# Patient Record
Sex: MALE | Race: WHITE | NOT HISPANIC OR LATINO | Employment: OTHER | URBAN - METROPOLITAN AREA
[De-identification: names, ages, dates, MRNs, and addresses within clinical notes are randomized per-mention and may not be internally consistent; named-entity substitution may affect disease eponyms.]

---

## 2017-11-07 LAB — CRC RECOMMENDATION EXT: NORMAL

## 2022-04-13 LAB
LEFT EYE DM RETINOPATHY: NEGATIVE
RIGHT EYE DM RETINOPATHY: NEGATIVE

## 2022-09-27 ENCOUNTER — OFFICE VISIT (OUTPATIENT)
Dept: PRIMARY CARE CLINIC | Facility: CLINIC | Age: 77
End: 2022-09-27
Payer: MEDICARE

## 2022-09-27 VITALS
WEIGHT: 174 LBS | HEIGHT: 68 IN | BODY MASS INDEX: 26.37 KG/M2 | HEART RATE: 75 BPM | SYSTOLIC BLOOD PRESSURE: 130 MMHG | DIASTOLIC BLOOD PRESSURE: 70 MMHG | RESPIRATION RATE: 20 BRPM | TEMPERATURE: 97 F | OXYGEN SATURATION: 98 %

## 2022-09-27 DIAGNOSIS — Z12.11 SCREEN FOR COLON CANCER: ICD-10-CM

## 2022-09-27 DIAGNOSIS — Z00.00 MEDICARE ANNUAL WELLNESS VISIT, SUBSEQUENT: ICD-10-CM

## 2022-09-27 DIAGNOSIS — E55.9 VITAMIN D DEFICIENCY: ICD-10-CM

## 2022-09-27 DIAGNOSIS — R41.3 MEMORY PROBLEM: ICD-10-CM

## 2022-09-27 DIAGNOSIS — R26.89 BALANCE PROBLEM: Primary | ICD-10-CM

## 2022-09-27 DIAGNOSIS — M25.512 LEFT SHOULDER PAIN, UNSPECIFIED CHRONICITY: ICD-10-CM

## 2022-09-27 DIAGNOSIS — R32 URINARY INCONTINENCE, UNSPECIFIED TYPE: ICD-10-CM

## 2022-09-27 DIAGNOSIS — R41.3 OTHER AMNESIA: ICD-10-CM

## 2022-09-27 DIAGNOSIS — E11.9 TYPE 2 DIABETES MELLITUS WITHOUT COMPLICATION, WITHOUT LONG-TERM CURRENT USE OF INSULIN: ICD-10-CM

## 2022-09-27 PROCEDURE — 99204 PR OFFICE/OUTPT VISIT, NEW, LEVL IV, 45-59 MIN: ICD-10-PCS | Mod: ,,, | Performed by: STUDENT IN AN ORGANIZED HEALTH CARE EDUCATION/TRAINING PROGRAM

## 2022-09-27 PROCEDURE — 99204 OFFICE O/P NEW MOD 45 MIN: CPT | Mod: ,,, | Performed by: STUDENT IN AN ORGANIZED HEALTH CARE EDUCATION/TRAINING PROGRAM

## 2022-09-27 RX ORDER — DEXTROAMPHETAMINE SACCHARATE, AMPHETAMINE ASPARTATE MONOHYDRATE, DEXTROAMPHETAMINE SULFATE AND AMPHETAMINE SULFATE 6.25; 6.25; 6.25; 6.25 MG/1; MG/1; MG/1; MG/1
25 CAPSULE, EXTENDED RELEASE ORAL EVERY MORNING
COMMUNITY
End: 2022-10-03

## 2022-09-27 RX ORDER — INSULIN PUMP SYRINGE, 3 ML
EACH MISCELLANEOUS
COMMUNITY

## 2022-09-27 RX ORDER — METFORMIN HYDROCHLORIDE 1000 MG/1
1000 TABLET ORAL DAILY
COMMUNITY
End: 2022-11-09

## 2022-09-27 RX ORDER — SERTRALINE HYDROCHLORIDE 50 MG/1
50 TABLET, FILM COATED ORAL DAILY
COMMUNITY
End: 2022-10-03

## 2022-09-27 RX ORDER — IBUPROFEN 200 MG
200 TABLET ORAL EVERY 8 HOURS PRN
COMMUNITY
End: 2023-04-10

## 2022-09-27 RX ORDER — ACETAMINOPHEN 325 MG/1
325 TABLET ORAL EVERY 6 HOURS PRN
COMMUNITY

## 2022-09-27 NOTE — PROGRESS NOTES
"Subjective:       Patient ID: Clifford Warren is a 77 y.o. male.    ----------------------------  ADHD (attention deficit hyperactivity disorder)  Depression  Diabetes mellitus, type 2  [place] (hard of hearing)  Loss of equilibrium  S/P epidural steroid injection  Sleep apnea, unspecified      Comment:  "not using Cpap"  Smoker     Chief Complaint: Establish Care (Recently moved from [place] ), Dr MIGUELINA Moore -[facility]  (C/o back pain , ongoing ,10/2021 had steroid injection Dr Rodriguez -[place] [facility]), Gait Problem, and Diarrhea (Colonoscopy due, referral to GI locate)    Presents to establish care. Main issue is poor balance. Present for few years. Has seen Neuro in [place] (Dr. Rodriguez at [facility] [facility]). Has undergone CT's, audiology testing, EMGs, etc and never seen much improvement. Underwent injections in the lumbar spine for chronic back pain and 4 months of PT which produced a little improvement but not much and not permanent. Seems that no clear diagnosis was ever made.     DM2 - not on meds sec to side effects from metformin. Seen by Dr. Gar. Most recent A1c 7.4%.     Psych - Was on Adderall for 30+ years. Was on Zoloft 30+ years. Has not been taking either recently and wants to be off them. Has not noted any new symptoms or issues since stopping them.       Review of Systems   Constitutional:  Negative for chills, fever and unexpected weight change.   HENT:  Negative for sinus pressure/congestion and sore throat.    Eyes:  Negative for pain and redness.   Respiratory:  Negative for cough, shortness of breath and wheezing.    Cardiovascular:  Negative for chest pain and leg swelling.   Gastrointestinal:  Negative for abdominal pain, nausea and vomiting.   Endocrine: Negative for polydipsia and polyuria.   Genitourinary:  Negative for dysuria and hematuria.   Musculoskeletal:  Positive for arthralgias (L shoulder pain). Negative for myalgias.   Integumentary:  Negative " for rash and mole/lesion.   Neurological:  Positive for dizziness, coordination difficulties, memory loss and coordination difficulties.   Hematological:  Negative for adenopathy. Does not bruise/bleed easily.   Psychiatric/Behavioral:  Positive for confusion. The patient is not nervous/anxious.          Objective:      Physical Exam  Vitals and nursing note reviewed.   Constitutional:       General: He is not in acute distress.     Appearance: He is not ill-appearing.   HENT:      Nose: No congestion or rhinorrhea.      Mouth/Throat:      Mouth: Mucous membranes are moist.      Pharynx: No oropharyngeal exudate or posterior oropharyngeal erythema.   Eyes:      Extraocular Movements: Extraocular movements intact.      Conjunctiva/sclera: Conjunctivae normal.      Pupils: Pupils are equal, round, and reactive to light.   Cardiovascular:      Rate and Rhythm: Normal rate and regular rhythm.      Pulses: Normal pulses.   Pulmonary:      Effort: Pulmonary effort is normal.      Breath sounds: Normal breath sounds.   Abdominal:      Palpations: Abdomen is soft. There is no mass.      Tenderness: There is no abdominal tenderness.   Musculoskeletal:         General: No deformity.      Right lower leg: No edema.      Left lower leg: No edema.   Lymphadenopathy:      Cervical: No cervical adenopathy.   Skin:     General: Skin is warm and dry.      Findings: No rash.   Neurological:      General: No focal deficit present.      Mental Status: He is alert and oriented to person, place, and time. Mental status is at baseline.      Gait: Gait abnormal (unstable on initial standing).   Psychiatric:         Mood and Affect: Mood normal.         Behavior: Behavior normal.         Assessment & Plan:   1. Balance problem  Assessment & Plan:  Pt has impaired balance, cognitive/memory impairment, and urinary incontinence. We obtained his most recent CT head (from [facility]) which showed ventricular dilation - this was attributed to  microvascular disease and volume loss.   However, given the constellation of symptoms and potentially reversible nature of NPH, patient warrants further eval with MRI which is more sensitive/specific. MRI brain w/o contrast ordered  We'll continue working to obtain records from [facility] in the meantime  Started referral process to Neuro (typically 4-5 month wait)    Orders:  -     Ambulatory referral/consult to Neurology    2. Memory problem  -     Ambulatory referral/consult to Neurology    3. Urinary incontinence, unspecified type  -     Ambulatory referral/consult to Neurology    4. Left shoulder pain, unspecified chronicity  -     Ambulatory referral/consult to Orthopedics    5. Screen for colon cancer  -     Ambulatory referral/consult to Gastroenterology    6. Medicare annual wellness visit, subsequent  -     CBC Auto Differential  -     Comprehensive Metabolic Panel  -     Hemoglobin A1C  -     Lipid Panel  -     TSH  -     Urinalysis, Reflex to Urine Culture Urine, Clean Catch  -     Vitamin D    7. Type 2 diabetes mellitus without complication, without long-term current use of insulin  -     CBC Auto Differential  -     Comprehensive Metabolic Panel  -     Hemoglobin A1C  -     Lipid Panel    8. Vitamin D deficiency  -     Vitamin D    9. Other amnesia  -     MRI Brain Without Contrast        Follow up in about 6 weeks (around 11/8/2022) for Wellness. In addition to their scheduled follow up, the patient has also been instructed to follow up on as needed basis.

## 2022-10-03 ENCOUNTER — DOCUMENTATION ONLY (OUTPATIENT)
Dept: PRIMARY CARE CLINIC | Facility: CLINIC | Age: 77
End: 2022-10-03
Payer: MEDICARE

## 2022-10-03 PROBLEM — R26.89 BALANCE PROBLEM: Status: ACTIVE | Noted: 2022-10-03

## 2022-10-03 NOTE — ASSESSMENT & PLAN NOTE
Pt has impaired balance, cognitive/memory impairment, and urinary incontinence. We obtained his most recent CT head (from [facility]) which showed ventricular dilation - this was attributed to microvascular disease and volume loss.   However, given the constellation of symptoms and potentially reversible nature of NPH, patient warrants further eval with MRI which is more sensitive/specific. MRI brain w/o contrast ordered  We'll continue working to obtain records from [facility] in the meantime  Started referral process to Neuro (typically 4-5 month wait)

## 2022-10-12 ENCOUNTER — HOSPITAL ENCOUNTER (OUTPATIENT)
Dept: RADIOLOGY | Facility: CLINIC | Age: 77
Discharge: HOME | End: 2022-10-12
Attending: ORTHOPAEDIC SURGERY
Payer: MEDICARE

## 2022-10-12 ENCOUNTER — OFFICE VISIT (OUTPATIENT)
Dept: ORTHOPEDICS | Facility: CLINIC | Age: 77
End: 2022-10-12
Payer: MEDICARE

## 2022-10-12 VITALS
WEIGHT: 174 LBS | HEIGHT: 68 IN | SYSTOLIC BLOOD PRESSURE: 134 MMHG | BODY MASS INDEX: 26.37 KG/M2 | DIASTOLIC BLOOD PRESSURE: 75 MMHG | HEART RATE: 69 BPM

## 2022-10-12 DIAGNOSIS — M75.42 IMPINGEMENT SYNDROME OF LEFT SHOULDER: Primary | ICD-10-CM

## 2022-10-12 DIAGNOSIS — M25.512 LEFT SHOULDER PAIN, UNSPECIFIED CHRONICITY: ICD-10-CM

## 2022-10-12 PROCEDURE — 99203 PR OFFICE/OUTPT VISIT, NEW, LEVL III, 30-44 MIN: ICD-10-PCS | Mod: 25,,, | Performed by: ORTHOPAEDIC SURGERY

## 2022-10-12 PROCEDURE — 73030 XR SHOULDER COMPLETE 2 OR MORE VIEWS LEFT: ICD-10-PCS | Mod: LT,,, | Performed by: ORTHOPAEDIC SURGERY

## 2022-10-12 PROCEDURE — 73030 X-RAY EXAM OF SHOULDER: CPT | Mod: LT,,, | Performed by: ORTHOPAEDIC SURGERY

## 2022-10-12 PROCEDURE — 20610 LARGE JOINT ASPIRATION/INJECTION: L SUBACROMIAL BURSA: ICD-10-PCS | Mod: LT,,, | Performed by: ORTHOPAEDIC SURGERY

## 2022-10-12 PROCEDURE — 20610 DRAIN/INJ JOINT/BURSA W/O US: CPT | Mod: LT,,, | Performed by: ORTHOPAEDIC SURGERY

## 2022-10-12 PROCEDURE — 99203 OFFICE O/P NEW LOW 30 MIN: CPT | Mod: 25,,, | Performed by: ORTHOPAEDIC SURGERY

## 2022-10-12 RX ORDER — LIDOCAINE HYDROCHLORIDE 20 MG/ML
5 INJECTION, SOLUTION EPIDURAL; INFILTRATION; INTRACAUDAL; PERINEURAL
Status: DISCONTINUED | OUTPATIENT
Start: 2022-10-12 | End: 2022-10-12 | Stop reason: HOSPADM

## 2022-10-12 RX ORDER — BETAMETHASONE SODIUM PHOSPHATE AND BETAMETHASONE ACETATE 3; 3 MG/ML; MG/ML
6 INJECTION, SUSPENSION INTRA-ARTICULAR; INTRALESIONAL; INTRAMUSCULAR; SOFT TISSUE
Status: DISCONTINUED | OUTPATIENT
Start: 2022-10-12 | End: 2022-10-12 | Stop reason: HOSPADM

## 2022-10-12 RX ADMIN — LIDOCAINE HYDROCHLORIDE 5 ML: 20 INJECTION, SOLUTION EPIDURAL; INFILTRATION; INTRACAUDAL; PERINEURAL at 01:10

## 2022-10-12 RX ADMIN — BETAMETHASONE SODIUM PHOSPHATE AND BETAMETHASONE ACETATE 6 MG: 3; 3 INJECTION, SUSPENSION INTRA-ARTICULAR; INTRALESIONAL; INTRAMUSCULAR; SOFT TISSUE at 01:10

## 2022-10-12 NOTE — PROCEDURES
Large Joint Aspiration/Injection: L subacromial bursa    Date/Time: 10/12/2022 1:00 PM  Performed by: Cipriano Hou Jr., MD  Authorized by: Cipriano Hou Jr., MD     Consent Done?:  Yes (Verbal)  Indications:  Pain  Site marked: the procedure site was marked    Timeout: prior to procedure the correct patient, procedure, and site was verified    Prep: patient was prepped and draped in usual sterile fashion      Local anesthesia used?: Yes    Local anesthetic:  Topical anesthetic    Details:  Needle Size:  21 G  Ultrasonic Guidance for needle placement?: No    Approach:  Lateral  Location:  Shoulder  Site:  L subacromial bursa  Medications:  5 mL LIDOcaine (PF) 20 mg/mL (2%) 20 mg/mL (2 %); 6 mg betamethasone acetate-betamethasone sodium phosphate 6 mg/mL  Patient tolerance:  Patient tolerated the procedure well with no immediate complications

## 2022-10-12 NOTE — PROGRESS NOTES
"Chief Complaint:   Chief Complaint   Patient presents with    Left Shoulder - Pain    Pain     unable to take temp, Left shoulder pain, isnt sure how he injuried it, wife thinks it might have been from trying to up root a tree a few years back, notices with rest hes doing better, has some balance issues right now had a fall august 1st       Consulting Physician: Edu Morrow MD    History of present illness:    he is a pleasant 77 y.o. year old male who is had left shoulder pain over the last few years and increasing in the spring and summer.  The pain is located posterior along the shoulder.  It is worse with yd work.  It is better at rest.  He denies any new numbness or tingling.    Past Medical History:   Diagnosis Date    ADHD (attention deficit hyperactivity disorder)     Depression     Diabetes mellitus, type 2     [place] (hard of hearing)     Loss of equilibrium     S/P epidural steroid injection     Sleep apnea, unspecified     "not using Cpap"    Smoker        Past Surgical History:   Procedure Laterality Date    CATARACT EXTRACTION, BILATERAL  2021    CHOLECYSTECTOMY  2005    COLONOSCOPY      EYE SURGERY      HERNIA REPAIR      TONSILLECTOMY  1950       Current Outpatient Medications   Medication Sig    acetaminophen (TYLENOL) 325 MG tablet Take 325 mg by mouth every 6 (six) hours as needed for Pain.    blood-glucose meter kit by Other route. Use as instructed    Accu-check guide  Accu-check fastclix lancets    ibuprofen (ADVIL,MOTRIN) 200 MG tablet Take 200 mg by mouth every 8 (eight) hours as needed for Pain.    metFORMIN (GLUCOPHAGE) 1000 MG tablet Take 1,000 mg by mouth once daily.     No current facility-administered medications for this visit.       Review of patient's allergies indicates:  No Known Allergies    Family History   Problem Relation Age of Onset    Diabetes Mother     Hearing loss Mother     Stroke Mother     Cancer Father         bladder ca    Dementia Father     Depression Father  " "   Learning disabilities Sister        Social History     Socioeconomic History    Marital status:    Tobacco Use    Smoking status: Every Day     Packs/day: 1.00     Years: 15.00     Pack years: 15.00     Types: Cigarettes     Passive exposure: Current    Smokeless tobacco: Never   Substance and Sexual Activity    Alcohol use: Not Currently    Drug use: Never    Sexual activity: Not Currently     Partners: Female     Birth control/protection: None       Review of Systems:    Constitution:   Denies chills, fever, and sweats.  HENT:   Denies headaches or blurry vision.  Cardiovascular:  Denies chest pain or irregular heart beat.  Respiratory:   Denies cough or shortness of breath.  Gastrointestinal:  Denies abdominal pain, nausea, or vomiting.  Musculoskeletal:   Denies muscle cramps.  Neurological:   Denies dizziness or focal weakness.  Psychiatric/Behavior: Normal mental status.  Hematology/Lymph:  Denies bleeding problem or easy bruising/bleeding.  Skin:    Denies rash or suspicious lesions.    Examination:    Vital Signs:    Vitals:    10/12/22 1311   BP: 134/75   Pulse: 69   Weight: 78.9 kg (174 lb)   Height: 5' 8" (1.727 m)       Body mass index is 26.46 kg/m².    Constitution:   Well-developed, well nourished patient in no acute distress.  Neurological:   Alert and oriented x 3 and cooperative to examination.     Psychiatric/Behavior: Normal mental status.  Respiratory:   No shortness of breath.  Eyes:    Extraoccular muscles intact  Skin:    No scars, rash or suspicious lesions.    MSK:   Shoulder Exam:                   Right        Left  Skin:                                   Normal     Normal  AC joint tenderness:           None         None  Forward Flexion:                180            140  Abduction:                          180           100  External Rotation:               80              80  Internal Rotation:                80             80  Supraspinatus stress test: Neg           " Neg  Hawkin's Impingement:     Neg           +  Neer Impingement:            Neg           +  Apprehension:                   Neg           Neg  [place]'s:                           Neg           Neg  Speed's test:                     Neg            Neg  Strength:  External Rotation:           5/5                5/5  Lift Off/belly press:          5/5                5/5    N-V status:                   Intact             Intact    C-spine: Normal ROM, NT      Imaging: X-rays ordered and images interpreted today personally by me of four views left shoulder show normal bony alignment without arthritis        Assessment: Impingement syndrome of left shoulder  -     Ambulatory referral/consult to Orthopedics  -     X-Ray Shoulder 2 or More Views Left; Future; Expected date: 10/12/2022        Plan:  We will try an injection today.  If his pain persists will consider formal therapy

## 2022-10-13 ENCOUNTER — PATIENT MESSAGE (OUTPATIENT)
Dept: PRIMARY CARE CLINIC | Facility: CLINIC | Age: 77
End: 2022-10-13
Payer: MEDICARE

## 2022-11-03 ENCOUNTER — DOCUMENTATION ONLY (OUTPATIENT)
Dept: PRIMARY CARE CLINIC | Facility: CLINIC | Age: 77
End: 2022-11-03
Payer: MEDICARE

## 2022-11-07 ENCOUNTER — LAB VISIT (OUTPATIENT)
Dept: LAB | Facility: HOSPITAL | Age: 77
End: 2022-11-07
Attending: STUDENT IN AN ORGANIZED HEALTH CARE EDUCATION/TRAINING PROGRAM
Payer: MEDICARE

## 2022-11-07 DIAGNOSIS — E11.9 TYPE 2 DIABETES MELLITUS WITHOUT COMPLICATION, WITHOUT LONG-TERM CURRENT USE OF INSULIN: ICD-10-CM

## 2022-11-07 DIAGNOSIS — R32 URINARY INCONTINENCE, UNSPECIFIED TYPE: Primary | ICD-10-CM

## 2022-11-07 DIAGNOSIS — R41.3 OTHER AMNESIA: ICD-10-CM

## 2022-11-07 DIAGNOSIS — Z00.00 MEDICARE ANNUAL WELLNESS VISIT, SUBSEQUENT: ICD-10-CM

## 2022-11-07 DIAGNOSIS — Z12.11 SCREEN FOR COLON CANCER: ICD-10-CM

## 2022-11-07 DIAGNOSIS — E55.9 VITAMIN D DEFICIENCY: ICD-10-CM

## 2022-11-07 DIAGNOSIS — R32 URINARY INCONTINENCE, UNSPECIFIED TYPE: ICD-10-CM

## 2022-11-07 LAB
ALBUMIN SERPL-MCNC: 4.5 GM/DL (ref 3.4–4.8)
ALBUMIN/GLOB SERPL: 1.4 RATIO (ref 1.1–2)
ALP SERPL-CCNC: 48 UNIT/L (ref 40–150)
ALT SERPL-CCNC: 21 UNIT/L (ref 0–55)
APPEARANCE UR: CLEAR
AST SERPL-CCNC: 18 UNIT/L (ref 5–34)
BACTERIA #/AREA URNS AUTO: NORMAL /HPF
BASOPHILS # BLD AUTO: 0.06 X10(3)/MCL (ref 0–0.2)
BASOPHILS NFR BLD AUTO: 1 %
BILIRUB UR QL STRIP.AUTO: NEGATIVE MG/DL
BILIRUBIN DIRECT+TOT PNL SERPL-MCNC: 0.5 MG/DL
BUN SERPL-MCNC: 23 MG/DL (ref 8.4–25.7)
CALCIUM SERPL-MCNC: 10.2 MG/DL (ref 8.8–10)
CHLORIDE SERPL-SCNC: 102 MMOL/L (ref 98–107)
CHOLEST SERPL-MCNC: 230 MG/DL
CHOLEST/HDLC SERPL: 5 {RATIO} (ref 0–5)
CO2 SERPL-SCNC: 26 MMOL/L (ref 23–31)
COLOR UR AUTO: YELLOW
CREAT SERPL-MCNC: 0.95 MG/DL (ref 0.73–1.18)
DEPRECATED CALCIDIOL+CALCIFEROL SERPL-MC: 42.3 NG/ML (ref 30–80)
EOSINOPHIL # BLD AUTO: 0.19 X10(3)/MCL (ref 0–0.9)
EOSINOPHIL NFR BLD AUTO: 3 %
ERYTHROCYTE [DISTWIDTH] IN BLOOD BY AUTOMATED COUNT: 11.9 % (ref 11.5–17)
EST. AVERAGE GLUCOSE BLD GHB EST-MCNC: 231.7 MG/DL
GFR SERPLBLD CREATININE-BSD FMLA CKD-EPI: >60 MLS/MIN/1.73/M2
GLOBULIN SER-MCNC: 3.3 GM/DL (ref 2.4–3.5)
GLUCOSE SERPL-MCNC: 236 MG/DL (ref 82–115)
GLUCOSE UR QL STRIP.AUTO: ABNORMAL MG/DL
HBA1C MFR BLD: 9.7 %
HCT VFR BLD AUTO: 47.1 % (ref 42–52)
HDLC SERPL-MCNC: 44 MG/DL (ref 35–60)
HGB BLD-MCNC: 16 GM/DL (ref 14–18)
IMM GRANULOCYTES # BLD AUTO: 0.03 X10(3)/MCL (ref 0–0.04)
IMM GRANULOCYTES NFR BLD AUTO: 0.5 %
KETONES UR QL STRIP.AUTO: NEGATIVE MG/DL
LDLC SERPL CALC-MCNC: 133 MG/DL (ref 50–140)
LEUKOCYTE ESTERASE UR QL STRIP.AUTO: NEGATIVE UNIT/L
LYMPHOCYTES # BLD AUTO: 1.33 X10(3)/MCL (ref 0.6–4.6)
LYMPHOCYTES NFR BLD AUTO: 21.1 %
MCH RBC QN AUTO: 31.5 PG (ref 27–31)
MCHC RBC AUTO-ENTMCNC: 34 MG/DL (ref 33–36)
MCV RBC AUTO: 92.7 FL (ref 80–94)
MONOCYTES # BLD AUTO: 0.63 X10(3)/MCL (ref 0.1–1.3)
MONOCYTES NFR BLD AUTO: 10 %
NEUTROPHILS # BLD AUTO: 4.1 X10(3)/MCL (ref 2.1–9.2)
NEUTROPHILS NFR BLD AUTO: 64.4 %
NITRITE UR QL STRIP.AUTO: NEGATIVE
NRBC BLD AUTO-RTO: 0 %
PH UR STRIP.AUTO: 5 [PH]
PLATELET # BLD AUTO: 252 X10(3)/MCL (ref 130–400)
PMV BLD AUTO: 9.8 FL (ref 7.4–10.4)
POTASSIUM SERPL-SCNC: 4.6 MMOL/L (ref 3.5–5.1)
PROT SERPL-MCNC: 7.8 GM/DL (ref 5.8–7.6)
PROT UR QL STRIP.AUTO: ABNORMAL MG/DL
RBC # BLD AUTO: 5.08 X10(6)/MCL (ref 4.7–6.1)
RBC #/AREA URNS AUTO: <5 /HPF
RBC UR QL AUTO: NEGATIVE UNIT/L
SODIUM SERPL-SCNC: 136 MMOL/L (ref 136–145)
SP GR UR STRIP.AUTO: 1.03 (ref 1–1.03)
SQUAMOUS #/AREA URNS AUTO: <5 /HPF
TRIGL SERPL-MCNC: 264 MG/DL (ref 34–140)
TSH SERPL-ACNC: 1.81 UIU/ML (ref 0.35–4.94)
UROBILINOGEN UR STRIP-ACNC: 0.2 MG/DL
VLDLC SERPL CALC-MCNC: 53 MG/DL
WBC # SPEC AUTO: 6.3 X10(3)/MCL (ref 4.5–11.5)
WBC #/AREA URNS AUTO: 5 /HPF

## 2022-11-07 PROCEDURE — 82306 VITAMIN D 25 HYDROXY: CPT

## 2022-11-07 PROCEDURE — 36415 COLL VENOUS BLD VENIPUNCTURE: CPT

## 2022-11-07 PROCEDURE — 81001 URINALYSIS AUTO W/SCOPE: CPT

## 2022-11-07 PROCEDURE — 84443 ASSAY THYROID STIM HORMONE: CPT

## 2022-11-07 PROCEDURE — 83036 HEMOGLOBIN GLYCOSYLATED A1C: CPT

## 2022-11-07 PROCEDURE — 85025 COMPLETE CBC W/AUTO DIFF WBC: CPT

## 2022-11-07 PROCEDURE — 80061 LIPID PANEL: CPT

## 2022-11-07 PROCEDURE — 80053 COMPREHEN METABOLIC PANEL: CPT

## 2022-11-09 ENCOUNTER — OFFICE VISIT (OUTPATIENT)
Dept: PRIMARY CARE CLINIC | Facility: CLINIC | Age: 77
End: 2022-11-09
Payer: MEDICARE

## 2022-11-09 VITALS
WEIGHT: 175 LBS | HEIGHT: 68 IN | TEMPERATURE: 98 F | RESPIRATION RATE: 20 BRPM | HEART RATE: 81 BPM | OXYGEN SATURATION: 97 % | SYSTOLIC BLOOD PRESSURE: 143 MMHG | BODY MASS INDEX: 26.52 KG/M2 | DIASTOLIC BLOOD PRESSURE: 75 MMHG

## 2022-11-09 DIAGNOSIS — E11.65 TYPE 2 DIABETES MELLITUS WITH HYPERGLYCEMIA, WITHOUT LONG-TERM CURRENT USE OF INSULIN: ICD-10-CM

## 2022-11-09 DIAGNOSIS — Z00.00 MEDICARE ANNUAL WELLNESS VISIT, SUBSEQUENT: Primary | ICD-10-CM

## 2022-11-09 DIAGNOSIS — N39.43 POST-VOID DRIBBLING: ICD-10-CM

## 2022-11-09 DIAGNOSIS — Z87.891 FORMER SMOKER: ICD-10-CM

## 2022-11-09 DIAGNOSIS — Z23 NEED FOR INFLUENZA VACCINATION: ICD-10-CM

## 2022-11-09 DIAGNOSIS — E78.2 MIXED HYPERLIPIDEMIA: ICD-10-CM

## 2022-11-09 PROCEDURE — G0439 PPPS, SUBSEQ VISIT: HCPCS | Mod: ,,, | Performed by: STUDENT IN AN ORGANIZED HEALTH CARE EDUCATION/TRAINING PROGRAM

## 2022-11-09 PROCEDURE — G0008 ADMIN INFLUENZA VIRUS VAC: HCPCS | Mod: ,,, | Performed by: STUDENT IN AN ORGANIZED HEALTH CARE EDUCATION/TRAINING PROGRAM

## 2022-11-09 PROCEDURE — G0439 PR MEDICARE ANNUAL WELLNESS SUBSEQUENT VISIT: ICD-10-PCS | Mod: ,,, | Performed by: STUDENT IN AN ORGANIZED HEALTH CARE EDUCATION/TRAINING PROGRAM

## 2022-11-09 PROCEDURE — G0008 FLU VACCINE - QUADRIVALENT - ADJUVANTED: ICD-10-PCS | Mod: ,,, | Performed by: STUDENT IN AN ORGANIZED HEALTH CARE EDUCATION/TRAINING PROGRAM

## 2022-11-09 PROCEDURE — 90694 VACC AIIV4 NO PRSRV 0.5ML IM: CPT | Mod: ,,, | Performed by: STUDENT IN AN ORGANIZED HEALTH CARE EDUCATION/TRAINING PROGRAM

## 2022-11-09 PROCEDURE — 90694 FLU VACCINE - QUADRIVALENT - ADJUVANTED: ICD-10-PCS | Mod: ,,, | Performed by: STUDENT IN AN ORGANIZED HEALTH CARE EDUCATION/TRAINING PROGRAM

## 2022-11-09 RX ORDER — ROSUVASTATIN CALCIUM 20 MG/1
20 TABLET, COATED ORAL NIGHTLY
Qty: 90 TABLET | Refills: 3 | Status: SHIPPED | OUTPATIENT
Start: 2022-11-09 | End: 2024-01-29

## 2022-11-09 RX ORDER — TAMSULOSIN HYDROCHLORIDE 0.4 MG/1
0.4 CAPSULE ORAL DAILY
Qty: 30 CAPSULE | Refills: 11 | Status: SHIPPED | OUTPATIENT
Start: 2022-11-09 | End: 2023-07-10

## 2022-11-09 NOTE — PROGRESS NOTES
"  Patient ID: [id number]     Chief Complaint: Medicare AWV, Shoulder Pain (Right shoulder/), and lab/ MRI review      HPI:     Clifford Warren is a 77 y.o. male here today for a Medicare Wellness.     C/o dribbling after urination. Also nocturia 2-3 per night.    Opioid Screening: Patient medication list reviewed, patient is not taking prescription opioids. Patient is not using additional opioids than prescribed. Patient is at low risk of substance abuse based on this opioid use history.       ----------------------------  ADHD (attention deficit hyperactivity disorder)  Depression  Diabetes mellitus, type 2  [place] (hard of hearing)  Loss of equilibrium  S/P epidural steroid injection  Sleep apnea, unspecified      Comment:  "not using Cpap"  Smoker     Past Surgical History:   Procedure Laterality Date    CATARACT EXTRACTION, BILATERAL  2021    CHOLECYSTECTOMY  2005    COLONOSCOPY      EYE SURGERY      HERNIA REPAIR      TONSILLECTOMY  1950       Review of patient's allergies indicates:  No Known Allergies    No outpatient medications have been marked as taking for the 11/9/22 encounter (Office Visit) with Edu Morrow MD.       Social History     Socioeconomic History    Marital status:    Tobacco Use    Smoking status: Every Day     Packs/day: 1.00     Years: 15.00     Pack years: 15.00     Types: Cigarettes     Passive exposure: Current    Smokeless tobacco: Never   Substance and Sexual Activity    Alcohol use: Not Currently    Drug use: Never    Sexual activity: Not Currently     Partners: Female     Birth control/protection: None        Family History   Problem Relation Age of Onset    Diabetes Mother     Hearing loss Mother     Stroke Mother     Cancer Father         bladder ca    Dementia Father     Depression Father     Learning disabilities Sister         Patient Care Team:  Edu Morrow MD as PCP - General (Internal Medicine)  Feliciano Escalante Jr., DPM as Consulting Physician " (Podiatry)  Jeffry Gar MD as Consulting Physician (Endocrinology)       Subjective:     Review of Systems   Constitutional:  Negative for chills, fever and unexpected weight change.   HENT:  Negative for sinus pressure/congestion and sore throat.    Eyes:  Negative for pain and redness.   Respiratory:  Negative for cough, shortness of breath and wheezing.    Cardiovascular:  Negative for chest pain and leg swelling.   Gastrointestinal:  Negative for abdominal pain, nausea and vomiting.   Endocrine: Negative for polydipsia and polyuria.   Genitourinary:  Negative for dysuria and hematuria.   Musculoskeletal:  Negative for myalgias. Arthralgias: L shoulder pain.  Integumentary:  Negative for rash and mole/lesion.   Neurological:  Positive for dizziness, coordination difficulties, memory loss and coordination difficulties.   Hematological:  Negative for adenopathy. Does not bruise/bleed easily.   Psychiatric/Behavioral:  Positive for confusion. The patient is not nervous/anxious.      Patient Reported Health Risk Assessment  What is your age?: 70-79  Are you male or female?: Male  During the past four weeks, how much have you been bothered by emotional problems such as feeling anxious, depressed, irritable, sad, or downhearted and blue?: Not at all  During the past five weeks, has your physical and/or emotional health limited your social activities with family, friends, neighbors, or groups?: Not at all  During the past four weeks, how much bodily pain have you generally had?: Mild pain  During the past four weeks, was someone available to help if you needed and wanted help?: Yes, as much as I wanted  During the past four weeks, what was the hardest physical activity you could do for at least two minutes?: Moderate  Can you get to places out of walking distance without help?  (For example, can you travel alone on buses or taxis, or drive your own car?): Yes  Can you go shopping for groceries or clothes without  someone's help?: Yes  Can you prepare your own meals?: Yes  Can you do your own housework without help?: No  Because of any health problems, do you need the help of another person with your personal care needs such as eating, bathing, dressing, or getting around the house?: Yes  Can you handle your own money without help?: Yes  During the past four weeks, how would you rate your health in general?: Good  How have things been going for you during the past four weeks?: Pretty well  Are you having difficulties driving your car?: No  Do you always fasten your seat belt when you are in a car?: Yes, usually  How often in the past four weeks have you been bothered by falling or dizzy when standing up?: Never  How often in the past four weeks have you been bothered by sexual problems?: Never  How often in the past four weeks have you been bothered by trouble eating well?: Never  How often in the past four weeks have you been bothered by teeth or denture problems?: Never  How often in the past four weeks have you been bothered with problems using the telephone?: Never  How often in the past four weeks have you been bothered by tiredness or fatigue?: Never  Have you fallen two or more times in the past year?: No  Are you afraid of falling?: No  Are you a smoker?: Yes, I might quit  During the past four weeks, how many drinks of wine, beer, or other alcoholic beverages did you have?: No alcohol at all  Do you exercise for about 20 minutes three or more days a week?: No, I usually do not exercise this much  Have you been given any information to help you with hazards in your house that might hurt you?: Yes  Have you been given any information to help you with keeping track of your medications?: Yes  How often do you have trouble taking medicines the way you've been told to take them?: I always take them as prescribed  How confident are you that you can control and manage most of your health problems?: Somewhat confident  What  "is your race? (Check all that apply.):     Objective:     BP (!) 143/75   Pulse 81   Temp 98 °F (36.7 °C)   Resp 20   Ht 5' 8" (1.727 m)   Wt 79.4 kg (175 lb)   SpO2 97%   BMI 26.61 kg/m²     Physical Exam  Vitals and nursing note reviewed.   Constitutional:       General: He is not in acute distress.     Appearance: He is not ill-appearing.   HENT:      Nose: No congestion or rhinorrhea.      Mouth/Throat:      Mouth: Mucous membranes are moist.      Pharynx: No oropharyngeal exudate or posterior oropharyngeal erythema.   Eyes:      Extraocular Movements: Extraocular movements intact.      Conjunctiva/sclera: Conjunctivae normal.      Pupils: Pupils are equal, round, and reactive to light.   Cardiovascular:      Rate and Rhythm: Normal rate and regular rhythm.   Pulmonary:      Effort: Pulmonary effort is normal.      Breath sounds: Normal breath sounds.   Abdominal:      Palpations: Abdomen is soft. There is no mass.      Tenderness: There is no abdominal tenderness.   Musculoskeletal:         General: No deformity.      Right lower leg: No edema.      Left lower leg: No edema.   Skin:     General: Skin is warm and dry.      Findings: No rash.   Neurological:      General: No focal deficit present.      Mental Status: He is alert and oriented to person, place, and time. Mental status is at baseline.      Gait: Gait abnormal (unstable on initial standing).   Psychiatric:         Mood and Affect: Mood normal.         Behavior: Behavior normal.         No flowsheet data found.  Fall Risk Assessment - Outpatient 11/9/2022 10/12/2022 9/27/2022   Mobility Status Ambulatory Ambulatory Ambulatory   Number of falls 0 0 1   Identified as fall risk 0 0 1           Depression Screening  Over the past two weeks, has the patient felt down, depressed, or hopeless?: No  Over the past two weeks, has the patient felt little interest or pleasure in doing things?: No  Functional Ability/Safety Screening  Was the " patient's timed Up & Go test unsteady or longer than 30 seconds?: No  Does the patient need help with phone, transportation, shopping, preparing meals, housework, laundry, meds, or managing money?: No  Does the patient's home have rugs in the hallway, lack grab bars in the bathroom, lack handrails on the stairs or have poor lighting?: No  Have you noticed any hearing difficulties?: No  Cognitive Function (Assessed through direct observation with due consideration of information obtained by way of patient reports and/or concerns raised by family, friends, caretakers, or others)    Does the patient repeat questions/statements in the same day?: No  Does the patient have trouble remembering the date, year, and time?: No  Does the patient have difficulty managing finances?: No  Does the patient have a decreased sense of direction?: No  Assessment/Plan:     1. Medicare annual wellness visit, subsequent  Comments:  Reviewed labs. See below for health maintenance    2. Mixed hyperlipidemia  Assessment & Plan:  Discussed elevated ASCVD score. Also should be on statin in setting of DM2. Risks/benefits reviewed. Starting rosuvastatin 20 mg daily      3. Post-void dribbling  Comments:  trial of Flomax 0.4 mg daily. Pt was previously seeing [facility] We'll request records  Orders:  -     tamsulosin (FLOMAX) 0.4 mg Cap; Take 1 capsule (0.4 mg total) by mouth once daily.  Dispense: 30 capsule; Refill: 11    4. Type 2 diabetes mellitus with hyperglycemia, without long-term current use of insulin  -     rosuvastatin (CRESTOR) 20 MG tablet; Take 1 tablet (20 mg total) by mouth every evening.  Dispense: 90 tablet; Refill: 3    5. Need for influenza vaccination  -     Influenza (FLUAD) - Quadrivalent (Adjuvanted) *Preferred* (65+) (PF)    6. Former smoker  Comments:  Shared decision making discussion today. Pt wishes to proceed with CT screening. Risks/benfits reviewed  Orders:  -     CT Chest Lung Screening Low  Dose; Future; Expected date: 11/09/2022       Medicare Annual Wellness and Personalized Prevention Plan:   Fall Risk + Home Safety + Hearing Impairment + Depression Screen + Opioid and Substance Abuse Screening + Cognitive Impairment Screen + Health Risk Assessment all reviewed.     Vaccinations:   - Flu: Given today in clinic for 22/23 season   - Pneumonia: reports receiving Prevnar 13 and 20   - Shingles (>50): Pt unsure if he's had it, we discussed it today. He'll look for records   - Tdap: Received after laceration but unsure when, he'll think about whether or not he wants a new one   - COVID: received x 3  Screening:   - Prostate (50-70):    - Lung Ca. Screening (50-80 with >20 pack yrs current or quit <15 yrs): Discussed risks and benefits today including risks of false positives, etc. Pt is interested in scan   - Colon Ca. Screening (age >45): Has c-scope planned for January   - AAA (65-75 if ever smoked):       Advance Care Planning   I attest to discussing Advance Care Planning with patient and/or family member.  Education was provided including the importance of the Health Care Power of , Advance Directives, and/or [place] documentation.  The patient expressed understanding to the importance of this information and discussion. Informational packet provided.       Follow up in about 2 months (around 1/9/2023) for HLD. In addition to their scheduled follow up, the patient has also been instructed to follow up on as needed basis.

## 2022-11-14 ENCOUNTER — TELEPHONE (OUTPATIENT)
Dept: PRIMARY CARE CLINIC | Facility: CLINIC | Age: 77
End: 2022-11-14
Payer: MEDICARE

## 2022-11-14 NOTE — TELEPHONE ENCOUNTER
----- Message from Edu Morrow MD sent at 11/14/2022 10:29 AM CST -----  Please let Mr. Warren know that his lung cancer screening CT was negative. No evidence of lung cancer seen. We can repeat this scan in 1 year if he'd like. Will discuss repeat scans at his next wellness visit.    Thanks  Edu Morrow MD

## 2022-11-21 PROBLEM — I48.91 ATRIAL FIBRILLATION: Status: ACTIVE | Noted: 2022-11-21

## 2022-11-21 PROBLEM — J44.9 CHRONIC OBSTRUCTIVE PULMONARY DISEASE: Status: ACTIVE | Noted: 2022-11-21

## 2022-11-21 PROBLEM — E78.2 MIXED HYPERLIPIDEMIA: Status: ACTIVE | Noted: 2022-11-21

## 2022-11-21 PROBLEM — E11.9 TYPE 2 DIABETES MELLITUS WITHOUT COMPLICATION: Status: ACTIVE | Noted: 2022-11-21

## 2022-11-21 NOTE — ASSESSMENT & PLAN NOTE
Discussed elevated ASCVD score. Also should be on statin in setting of DM2. Risks/benefits reviewed. Starting rosuvastatin 20 mg daily

## 2023-01-05 LAB — CRC RECOMMENDATION EXT: NORMAL

## 2023-01-06 ENCOUNTER — DOCUMENTATION ONLY (OUTPATIENT)
Dept: PRIMARY CARE CLINIC | Facility: CLINIC | Age: 78
End: 2023-01-06
Payer: MEDICARE

## 2023-01-09 ENCOUNTER — OFFICE VISIT (OUTPATIENT)
Dept: PRIMARY CARE CLINIC | Facility: CLINIC | Age: 78
End: 2023-01-09
Payer: MEDICARE

## 2023-01-09 VITALS
HEIGHT: 68 IN | WEIGHT: 174 LBS | OXYGEN SATURATION: 96 % | SYSTOLIC BLOOD PRESSURE: 126 MMHG | HEART RATE: 100 BPM | RESPIRATION RATE: 20 BRPM | BODY MASS INDEX: 26.37 KG/M2 | DIASTOLIC BLOOD PRESSURE: 79 MMHG | TEMPERATURE: 98 F

## 2023-01-09 DIAGNOSIS — N39.43 POST-VOID DRIBBLING: ICD-10-CM

## 2023-01-09 DIAGNOSIS — R26.89 LOSS OF BALANCE: ICD-10-CM

## 2023-01-09 DIAGNOSIS — E11.65 TYPE 2 DIABETES MELLITUS WITH HYPERGLYCEMIA, WITHOUT LONG-TERM CURRENT USE OF INSULIN: Primary | ICD-10-CM

## 2023-01-09 LAB — HBA1C MFR BLD: 8.5 %

## 2023-01-09 PROCEDURE — 83036 HEMOGLOBIN GLYCOSYLATED A1C: CPT | Mod: QW,,, | Performed by: STUDENT IN AN ORGANIZED HEALTH CARE EDUCATION/TRAINING PROGRAM

## 2023-01-09 PROCEDURE — 99214 PR OFFICE/OUTPT VISIT, EST, LEVL IV, 30-39 MIN: ICD-10-PCS | Mod: ,,, | Performed by: STUDENT IN AN ORGANIZED HEALTH CARE EDUCATION/TRAINING PROGRAM

## 2023-01-09 PROCEDURE — 83036 POCT HEMOGLOBIN A1C: ICD-10-PCS | Mod: QW,,, | Performed by: STUDENT IN AN ORGANIZED HEALTH CARE EDUCATION/TRAINING PROGRAM

## 2023-01-09 PROCEDURE — 99214 OFFICE O/P EST MOD 30 MIN: CPT | Mod: ,,, | Performed by: STUDENT IN AN ORGANIZED HEALTH CARE EDUCATION/TRAINING PROGRAM

## 2023-01-09 RX ORDER — NAPROXEN SODIUM 220 MG/1
81 TABLET, FILM COATED ORAL DAILY
COMMUNITY
End: 2024-01-30

## 2023-01-09 RX ORDER — EMPAGLIFLOZIN 25 MG/1
25 TABLET, FILM COATED ORAL DAILY
COMMUNITY
Start: 2022-12-08

## 2023-01-09 RX ORDER — FINASTERIDE 5 MG/1
5 TABLET, FILM COATED ORAL DAILY
Qty: 30 TABLET | Refills: 11 | Status: SHIPPED | OUTPATIENT
Start: 2023-01-09 | End: 2023-11-09

## 2023-01-09 NOTE — PROGRESS NOTES
"Subjective:       Patient ID: Clifford Warren is a 77 y.o. male.    ----------------------------  ADHD (attention deficit hyperactivity disorder)  Depression  Diabetes mellitus, type 2  [place] (hard of hearing)  Loss of equilibrium  S/P epidural steroid injection  Sleep apnea, unspecified      Comment:  "not using Cpap"  Smoker     Chief Complaint: Follow-up (2 months f/u); c/o dizziness , onset few days  (" Not emptying bladder fully"); and appt 02/15/2023 Dr Sykes    Persistent dizziness, gait instability, etc. Has appt with Neuro 2/16/23.    C/o post-void dribbling. Has improved since starting Flomax but still present.     POC A1c 8.5% today. Compliant with meds but admits poor dietary compliance.     Review of Systems   Constitutional:  Negative for chills and fever.   HENT:  Negative for sinus pressure/congestion and sore throat.    Respiratory:  Negative for cough, shortness of breath and wheezing.    Cardiovascular:  Negative for chest pain and leg swelling.   Gastrointestinal:  Negative for abdominal pain, nausea and vomiting.   Genitourinary:  Positive for difficulty urinating (post void dribbling). Negative for dysuria and hematuria.   Musculoskeletal:  Negative for arthralgias and myalgias.   Integumentary:  Negative for rash.   Neurological:  Positive for dizziness, coordination difficulties, memory loss and coordination difficulties.   Hematological:  Negative for adenopathy.   Psychiatric/Behavioral:  Negative for confusion. The patient is not nervous/anxious.          Objective:      Physical Exam  Vitals and nursing note reviewed.   Constitutional:       General: He is not in acute distress.  HENT:      Mouth/Throat:      Mouth: Mucous membranes are moist.   Eyes:      Conjunctiva/sclera: Conjunctivae normal.      Pupils: Pupils are equal, round, and reactive to light.   Cardiovascular:      Rate and Rhythm: Normal rate and regular rhythm.   Pulmonary:      Effort: Pulmonary effort is " normal.      Breath sounds: Normal breath sounds.   Abdominal:      Palpations: Abdomen is soft.      Tenderness: There is no abdominal tenderness.   Musculoskeletal:         General: No deformity or signs of injury.   Skin:     General: Skin is warm and dry.      Findings: No rash.   Neurological:      General: No focal deficit present.      Mental Status: He is alert. Mental status is at baseline.      Gait: Gait abnormal (unstable on initial standing).   Psychiatric:         Mood and Affect: Mood normal.         Behavior: Behavior normal.         Assessment & Plan:   1. Type 2 diabetes mellitus with hyperglycemia, without long-term current use of insulin  Assessment & Plan:  POC A1c 8.5%   Continue Jardiance 25 mg daily  Add Tradjenta 5 mg    Orders:  -     POCT HEMOGLOBIN A1C  -     linaGLIPtin (TRADJENTA) 5 mg Tab tablet; Take 1 tablet (5 mg total) by mouth once daily.  Dispense: 90 tablet; Refill: 3    2. Post-void dribbling  Comments:  Continue Flomax. Add finasteride 5 mg daily. Refer to Urology   Orders:  -     finasteride (PROSCAR) 5 mg tablet; Take 1 tablet (5 mg total) by mouth once daily.  Dispense: 30 tablet; Refill: 11  -     Ambulatory referral/consult to Urology; Future; Expected date: 02/09/2023    3. Loss of balance  Assessment & Plan:  Has neuro appt in approx 6 weeks            Follow up in about 3 months (around 4/9/2023) for Diabetes. In addition to their scheduled follow up, the patient has also been instructed to follow up on as needed basis.

## 2023-01-10 ENCOUNTER — DOCUMENTATION ONLY (OUTPATIENT)
Dept: PRIMARY CARE CLINIC | Facility: CLINIC | Age: 78
End: 2023-01-10
Payer: MEDICARE

## 2023-01-10 NOTE — LETTER
This communication is flagged as high priority.        AUTHORIZATION FOR RELEASE OF   CONFIDENTIAL INFORMATION    Dear Dr Moore    We are seeing Clifford Warren, date of birth 1945, in the clinic at [facility]. Edu Morrow MD is the patient's PCP. Clifford Warren has an outstanding lab/procedure at the time we reviewed his chart. In order to help keep his health information updated, he has authorized us to request the following medical record(s):        (  )  MAMMOGRAM                                      (  )  COLONOSCOPY      (  )  PAP SMEAR                                          (  )  OUTSIDE LAB RESULTS     (  )  DEXA SCAN                                          (  )  EYE EXAM            (  )  FOOT EXAM                                          (  )  ENTIRE RECORD     (  )  OUTSIDE IMMUNIZATIONS                 (  x) last 2 clinic notes , vaccination records _______________         Please fax records to [facility], Jeffrey Fontenot, MD, [phone]   If you have any questions, please contact [phone]          Patient Name: Clifford Warren  : 1945  Patient Phone #: [phone]

## 2023-01-10 NOTE — LETTER
This communication is flagged as high priority.        AUTHORIZATION FOR RELEASE OF   CONFIDENTIAL INFORMATION    Dear Dr Palma  We are seeing Clifford Warren, date of birth 1945, in the clinic at [facility]. Edu Morrow MD is the patient's PCP. Clifford Warren has an outstanding lab/procedure at the time we reviewed his chart. In order to help keep his health information updated, he has authorized us to request the following medical record(s):        (  )  MAMMOGRAM                                      (  )  COLONOSCOPY      (  )  PAP SMEAR                                          (  )  OUTSIDE LAB RESULTS     (  )  DEXA SCAN                                          ( x )  EYE EXAM            (  )  FOOT EXAM                                          (  )  ENTIRE RECORD     (  )  OUTSIDE IMMUNIZATIONS                 (  )  _______________         Please fax records to [facility], Jeffrey Fontenot, MD,[phone]     If you have any questions, please contact [phone]          Patient Name: Clifford Warren  : 1945  Patient Phone #: [phone]

## 2023-01-17 PROBLEM — E11.65 TYPE 2 DIABETES MELLITUS WITH HYPERGLYCEMIA, WITHOUT LONG-TERM CURRENT USE OF INSULIN: Status: ACTIVE | Noted: 2022-11-21

## 2023-01-26 RX ORDER — SOD SULF/POT CHLORIDE/MAG SULF 1.479 G
TABLET ORAL
COMMUNITY
Start: 2022-11-28 | End: 2024-01-30

## 2023-02-16 ENCOUNTER — OFFICE VISIT (OUTPATIENT)
Dept: NEUROLOGY | Facility: CLINIC | Age: 78
End: 2023-02-16
Payer: MEDICARE

## 2023-02-16 VITALS
BODY MASS INDEX: 25.76 KG/M2 | WEIGHT: 170 LBS | SYSTOLIC BLOOD PRESSURE: 122 MMHG | HEIGHT: 68 IN | DIASTOLIC BLOOD PRESSURE: 82 MMHG

## 2023-02-16 DIAGNOSIS — G31.84 MILD COGNITIVE IMPAIRMENT: Primary | ICD-10-CM

## 2023-02-16 DIAGNOSIS — R41.3 MEMORY PROBLEM: ICD-10-CM

## 2023-02-16 DIAGNOSIS — R26.9 NEUROLOGIC GAIT DYSFUNCTION: ICD-10-CM

## 2023-02-16 DIAGNOSIS — G20.C PARKINSONISM, UNSPECIFIED PARKINSONISM TYPE: ICD-10-CM

## 2023-02-16 DIAGNOSIS — R32 URINARY INCONTINENCE, UNSPECIFIED TYPE: ICD-10-CM

## 2023-02-16 DIAGNOSIS — R26.89 BALANCE PROBLEM: ICD-10-CM

## 2023-02-16 PROCEDURE — 99205 OFFICE O/P NEW HI 60 MIN: CPT | Mod: S$PBB,,, | Performed by: NURSE PRACTITIONER

## 2023-02-16 PROCEDURE — 99214 OFFICE O/P EST MOD 30 MIN: CPT | Mod: PBBFAC | Performed by: NURSE PRACTITIONER

## 2023-02-16 PROCEDURE — 99999 PR PBB SHADOW E&M-EST. PATIENT-LVL IV: ICD-10-PCS | Mod: PBBFAC,,, | Performed by: NURSE PRACTITIONER

## 2023-02-16 PROCEDURE — 99999 PR PBB SHADOW E&M-EST. PATIENT-LVL IV: CPT | Mod: PBBFAC,,, | Performed by: NURSE PRACTITIONER

## 2023-02-16 PROCEDURE — 99205 PR OFFICE/OUTPT VISIT, NEW, LEVL V, 60-74 MIN: ICD-10-PCS | Mod: S$PBB,,, | Performed by: NURSE PRACTITIONER

## 2023-04-10 ENCOUNTER — OFFICE VISIT (OUTPATIENT)
Dept: PRIMARY CARE CLINIC | Facility: CLINIC | Age: 78
End: 2023-04-10
Payer: MEDICARE

## 2023-04-10 VITALS
RESPIRATION RATE: 20 BRPM | TEMPERATURE: 98 F | OXYGEN SATURATION: 96 % | WEIGHT: 172 LBS | DIASTOLIC BLOOD PRESSURE: 68 MMHG | HEART RATE: 102 BPM | SYSTOLIC BLOOD PRESSURE: 132 MMHG | BODY MASS INDEX: 26.07 KG/M2 | HEIGHT: 68 IN

## 2023-04-10 DIAGNOSIS — J30.2 SEASONAL ALLERGIC RHINITIS, UNSPECIFIED TRIGGER: ICD-10-CM

## 2023-04-10 DIAGNOSIS — Z85.828 HISTORY OF SKIN CANCER: ICD-10-CM

## 2023-04-10 DIAGNOSIS — G89.29 CHRONIC LEFT-SIDED LOW BACK PAIN WITH LEFT-SIDED SCIATICA: Primary | ICD-10-CM

## 2023-04-10 DIAGNOSIS — M54.42 CHRONIC LEFT-SIDED LOW BACK PAIN WITH LEFT-SIDED SCIATICA: Primary | ICD-10-CM

## 2023-04-10 DIAGNOSIS — M54.9 DORSALGIA, UNSPECIFIED: ICD-10-CM

## 2023-04-10 DIAGNOSIS — E11.65 TYPE 2 DIABETES MELLITUS WITH HYPERGLYCEMIA, WITHOUT LONG-TERM CURRENT USE OF INSULIN: ICD-10-CM

## 2023-04-10 PROBLEM — J44.9 CHRONIC OBSTRUCTIVE PULMONARY DISEASE: Status: RESOLVED | Noted: 2022-11-21 | Resolved: 2023-04-10

## 2023-04-10 PROBLEM — I48.91 ATRIAL FIBRILLATION: Status: RESOLVED | Noted: 2022-11-21 | Resolved: 2023-04-10

## 2023-04-10 PROCEDURE — 99214 PR OFFICE/OUTPT VISIT, EST, LEVL IV, 30-39 MIN: ICD-10-PCS | Mod: ,,, | Performed by: STUDENT IN AN ORGANIZED HEALTH CARE EDUCATION/TRAINING PROGRAM

## 2023-04-10 PROCEDURE — 99214 OFFICE O/P EST MOD 30 MIN: CPT | Mod: ,,, | Performed by: STUDENT IN AN ORGANIZED HEALTH CARE EDUCATION/TRAINING PROGRAM

## 2023-04-10 RX ORDER — MELOXICAM 7.5 MG/1
TABLET ORAL
Qty: 60 TABLET | Refills: 1 | Status: SHIPPED | OUTPATIENT
Start: 2023-04-10 | End: 2023-06-14

## 2023-04-10 RX ORDER — DULAGLUTIDE 1.5 MG/.5ML
INJECTION, SOLUTION SUBCUTANEOUS
COMMUNITY

## 2023-04-10 RX ORDER — FLUTICASONE PROPIONATE 50 MCG
1 SPRAY, SUSPENSION (ML) NASAL 2 TIMES DAILY
Qty: 15.8 ML | Refills: 11 | Status: SHIPPED | OUTPATIENT
Start: 2023-04-10 | End: 2023-11-09

## 2023-04-10 NOTE — PROGRESS NOTES
"Subjective:       Patient ID: Clifford Warren is a 78 y.o. male.    ----------------------------  ADHD (attention deficit hyperactivity disorder)  Depression  Diabetes mellitus, type 2  [place] (hard of hearing)  Loss of equilibrium  S/P epidural steroid injection  Sleep apnea, unspecified      Comment:  "not using Cpap"  Smoker     Chief Complaint: Follow-up and Diabetes (C/o chronic back pain - requesting pain management for injection/C/o being just tired)    C/o back pain, present for a few years, worsening over past 6 months, started after injury (digging up tree stump), after that did PT and thinks he had MRI. Pain worse on L, radiates down LLE and that's increasing in frequency/severity, LLE weakness/giving out and sometimes leg spasms/cramps.     Review of Systems   Constitutional:  Negative for chills and fever.   HENT:  Negative for sinus pressure/congestion and sore throat.    Respiratory:  Negative for cough, shortness of breath and wheezing.    Cardiovascular:  Negative for chest pain and leg swelling.   Gastrointestinal:  Negative for abdominal pain, nausea and vomiting.   Genitourinary:  Negative for dysuria and hematuria.   Musculoskeletal:  Positive for back pain. Negative for arthralgias and myalgias.   Integumentary:  Negative for rash.   Neurological:  Negative for dizziness and syncope.   Hematological:  Negative for adenopathy.   Psychiatric/Behavioral:  Positive for confusion. The patient is not nervous/anxious.          Objective:      Physical Exam  Vitals and nursing note reviewed.   Constitutional:       General: He is not in acute distress.  HENT:      Mouth/Throat:      Mouth: Mucous membranes are moist.   Eyes:      Conjunctiva/sclera: Conjunctivae normal.      Pupils: Pupils are equal, round, and reactive to light.   Cardiovascular:      Rate and Rhythm: Normal rate and regular rhythm.   Pulmonary:      Effort: Pulmonary effort is normal.      Breath sounds: Normal breath sounds. "   Abdominal:      Palpations: Abdomen is soft.      Tenderness: There is no abdominal tenderness.   Musculoskeletal:         General: No deformity or signs of injury.   Skin:     General: Skin is warm and dry.      Findings: No rash.   Neurological:      General: No focal deficit present.      Mental Status: He is alert. Mental status is at baseline.      Motor: Weakness (RLE 4/5 and LLE 3/5) present.      Gait: Gait abnormal (unstable on initial standing).   Psychiatric:         Mood and Affect: Mood normal.         Behavior: Behavior normal.         Assessment & Plan:   1. Chronic left-sided low back pain with left-sided sciatica  Assessment & Plan:  Present for years but worsening leg weakness/spasms over past few months. We'll start Mobic 7.5-15 mg daily PRN. Refer to PT. Ordered MRI lumbar spine in setting of leg weakness. Consider referral to Pain Management for injections if above not effective    Orders:  -     meloxicam (MOBIC) 7.5 MG tablet; Take 1 to 2 tabs by mouth per day as needed for back pain. Take with food.  Dispense: 60 tablet; Refill: 1  -     MRI Lumbar Spine Without Contrast; Future; Expected date: 04/11/2023  -     Ambulatory referral/consult to Physical/Occupational Therapy    2. Dorsalgia, unspecified  -     MRI Lumbar Spine Without Contrast; Future; Expected date: 04/11/2023    3. Type 2 diabetes mellitus with hyperglycemia, without long-term current use of insulin  Assessment & Plan:  Now following Endocrine - will defer to their management.  Endo stopped Tradjenta and started Trulicity  Still on Jardiance 25 mg daily       4. Seasonal allergic rhinitis, unspecified trigger  Assessment & Plan:  Recommended nasal saline rinse (NeilMed or similar) and Flonase Rx sent. Consider adding Zyrtec, Claritin, etc if not effective but will have to watch for anticholinergic effects (urinary issues, dementia, etc).     Orders:  -     fluticasone propionate (FLONASE) 50 mcg/actuation nasal spray; 1  spray (50 mcg total) by Each Nostril route 2 (two) times a day.  Dispense: 15.8 mL; Refill: 11    5. History of skin cancer  Comments:  pt reports cancerous lesions dx at previous dermatologist, hasn't had Derm checkup in >1 year. Referring today  Orders:  -     Ambulatory referral/consult to Dermatology        Follow up in about 3 months (around 7/10/2023) for Back pain. In addition to their scheduled follow up, the patient has also been instructed to follow up on as needed basis.

## 2023-04-11 PROBLEM — G89.29 CHRONIC LEFT-SIDED LOW BACK PAIN WITH LEFT-SIDED SCIATICA: Status: ACTIVE | Noted: 2023-04-11

## 2023-04-11 PROBLEM — J30.2 SEASONAL ALLERGIC RHINITIS: Status: ACTIVE | Noted: 2023-04-11

## 2023-04-11 PROBLEM — M54.42 CHRONIC LEFT-SIDED LOW BACK PAIN WITH LEFT-SIDED SCIATICA: Status: ACTIVE | Noted: 2023-04-11

## 2023-04-11 NOTE — ASSESSMENT & PLAN NOTE
Recommended nasal saline rinse (NeilMed or similar) and Flonase Rx sent. Consider adding Zyrtec, Claritin, etc if not effective but will have to watch for anticholinergic effects (urinary issues, dementia, etc).

## 2023-04-11 NOTE — ASSESSMENT & PLAN NOTE
Present for years but worsening leg weakness/spasms over past few months. We'll start Mobic 7.5-15 mg daily PRN. Refer to PT. Ordered MRI lumbar spine in setting of leg weakness. Consider referral to Pain Management for injections if above not effective

## 2023-04-11 NOTE — ASSESSMENT & PLAN NOTE
Now following Endocrine - will defer to their management.  Endo stopped Tradjenta and started Trulicity  Still on Jardiance 25 mg daily

## 2023-04-14 ENCOUNTER — TELEPHONE (OUTPATIENT)
Dept: PRIMARY CARE CLINIC | Facility: CLINIC | Age: 78
End: 2023-04-14
Payer: MEDICARE

## 2023-04-14 NOTE — TELEPHONE ENCOUNTER
Spoke to pt's wife. Reviewed MRI results. Plan is to continue PT (hasn't gone to first appt yet). If not improved after a few weeks, wife will notify us so we can consider referral for injection or other intervention.    Edu Morrow MD

## 2023-05-16 ENCOUNTER — TELEPHONE (OUTPATIENT)
Dept: PRIMARY CARE CLINIC | Facility: CLINIC | Age: 78
End: 2023-05-16
Payer: MEDICARE

## 2023-05-17 ENCOUNTER — OFFICE VISIT (OUTPATIENT)
Dept: PRIMARY CARE CLINIC | Facility: CLINIC | Age: 78
End: 2023-05-17
Payer: MEDICARE

## 2023-05-17 ENCOUNTER — OFFICE VISIT (OUTPATIENT)
Dept: NEUROLOGY | Facility: CLINIC | Age: 78
End: 2023-05-17
Payer: MEDICARE

## 2023-05-17 VITALS
DIASTOLIC BLOOD PRESSURE: 70 MMHG | SYSTOLIC BLOOD PRESSURE: 130 MMHG | WEIGHT: 169 LBS | TEMPERATURE: 96 F | HEART RATE: 96 BPM | BODY MASS INDEX: 25.61 KG/M2 | HEIGHT: 68 IN | RESPIRATION RATE: 20 BRPM | OXYGEN SATURATION: 97 %

## 2023-05-17 VITALS
SYSTOLIC BLOOD PRESSURE: 124 MMHG | BODY MASS INDEX: 26.07 KG/M2 | HEIGHT: 68 IN | DIASTOLIC BLOOD PRESSURE: 74 MMHG | WEIGHT: 172 LBS

## 2023-05-17 DIAGNOSIS — G20.C PARKINSONISM, UNSPECIFIED PARKINSONISM TYPE: Primary | ICD-10-CM

## 2023-05-17 DIAGNOSIS — R00.0 TACHYCARDIA: Primary | ICD-10-CM

## 2023-05-17 DIAGNOSIS — K59.09 CHRONIC CONSTIPATION: ICD-10-CM

## 2023-05-17 PROCEDURE — 99213 PR OFFICE/OUTPT VISIT, EST, LEVL III, 20-29 MIN: ICD-10-PCS | Mod: ,,, | Performed by: STUDENT IN AN ORGANIZED HEALTH CARE EDUCATION/TRAINING PROGRAM

## 2023-05-17 PROCEDURE — 99214 PR OFFICE/OUTPT VISIT, EST, LEVL IV, 30-39 MIN: ICD-10-PCS | Mod: S$PBB,,, | Performed by: NURSE PRACTITIONER

## 2023-05-17 PROCEDURE — 99213 OFFICE O/P EST LOW 20 MIN: CPT | Mod: ,,, | Performed by: STUDENT IN AN ORGANIZED HEALTH CARE EDUCATION/TRAINING PROGRAM

## 2023-05-17 PROCEDURE — 99999 PR PBB SHADOW E&M-EST. PATIENT-LVL III: ICD-10-PCS | Mod: PBBFAC,,, | Performed by: NURSE PRACTITIONER

## 2023-05-17 PROCEDURE — 99214 OFFICE O/P EST MOD 30 MIN: CPT | Mod: S$PBB,,, | Performed by: NURSE PRACTITIONER

## 2023-05-17 PROCEDURE — 99999 PR PBB SHADOW E&M-EST. PATIENT-LVL III: CPT | Mod: PBBFAC,,, | Performed by: NURSE PRACTITIONER

## 2023-05-17 PROCEDURE — 99213 OFFICE O/P EST LOW 20 MIN: CPT | Mod: PBBFAC | Performed by: NURSE PRACTITIONER

## 2023-05-17 RX ORDER — CARBIDOPA AND LEVODOPA 25; 100 MG/1; MG/1
1 TABLET ORAL 2 TIMES DAILY
Qty: 60 TABLET | Refills: 3 | Status: SHIPPED | OUTPATIENT
Start: 2023-05-17 | End: 2023-06-19 | Stop reason: SDUPTHER

## 2023-05-17 NOTE — PROGRESS NOTES
"  Neurology Follow up Note    Subjective:         Patient ID: Clifford Warren is a 78 y.o. male.    Chief Complaint: F/U Memory-Balance.     HPI:            Pt states his dizziness and balance are better, and his memory is better.     Pt has diff w swallowing, choking and drooling. Denies hallucinations. Sleeping well. Does not drive,and lives at home w his wife. Pt c/o Ua Incontinence.     No falls    ROS: as per HPI, otherwise pertinent systems review is negative          Past Medical History:   Diagnosis Date    ADHD (attention deficit hyperactivity disorder)     Depression     Diabetes mellitus, type 2     [place] (hard of hearing)     Loss of equilibrium     S/P epidural steroid injection     Sleep apnea, unspecified     "not using Cpap"    Smoker        Past Surgical History:   Procedure Laterality Date    CATARACT EXTRACTION, BILATERAL  2021    CHOLECYSTECTOMY  2005    COLONOSCOPY      EYE SURGERY      HERNIA REPAIR      TONSILLECTOMY  1950       Family History   Problem Relation Age of Onset    Diabetes Mother     Hearing loss Mother     Stroke Mother     Cancer Father         bladder ca    Dementia Father     Depression Father     Learning disabilities Sister        Social History     Socioeconomic History    Marital status:    Tobacco Use    Smoking status: Every Day     Packs/day: 1.00     Years: 15.00     Pack years: 15.00     Types: Cigarettes     Passive exposure: Current    Smokeless tobacco: Never   Substance and Sexual Activity    Alcohol use: Not Currently    Drug use: Never    Sexual activity: Not Currently     Partners: Female     Birth control/protection: None     Social Determinants of Health     Financial Resource Strain: Low Risk     Difficulty of Paying Living Expenses: Not hard at all   Physical Activity: Inactive    Days of Exercise per Week: 0 days    Minutes of Exercise per Session: 0 min   Housing Stability: Low Risk     Unable to Pay for Housing in the Last Year: No    " "Number of Places Lived in the Last Year: 1    Unstable Housing in the Last Year: No       Review of patient's allergies indicates:  No Known Allergies    Current Outpatient Medications   Medication Instructions    acetaminophen (TYLENOL) 325 mg, Oral, Every 6 hours PRN    aspirin 81 MG Chew 1 tab(s)    blood-glucose meter kit Other, Use as instructed <BR> Accu-check guide<BR>Accu-check fastclix lancets    dulaglutide (TRULICITY) 1.5 mg/0.5 mL pen injector Subcutaneous, Every 7 days    finasteride (PROSCAR) 5 mg, Oral, Daily    fluticasone propionate (FLONASE) 50 mcg, Each Nostril, 2 times daily    JARDIANCE 25 mg, Oral    meloxicam (MOBIC) 7.5 MG tablet Take 1 to 2 tabs by mouth per day as needed for back pain. Take with food.    rosuvastatin (CRESTOR) 20 mg, Oral, Nightly    SUTAB 1.479-0.188- 0.225 gram tablet Oral    tamsulosin (FLOMAX) 0.4 mg, Oral, Daily       Objective:      Exam:   Visit Vitals  /74   Ht 5' 8" (1.727 m)   Wt 78 kg (172 lb)   BMI 26.15 kg/m²       Physical Exam  Vitals reviewed.   Constitutional:       Appearance: Parkinsonian; decreased eye blink and mask face;      bradykinetic     Accompanied by: wife  HENT:      Ears:      Comments: [place]; requires hearing aids but did not have today  Eyes:      Extraocular Movements: Extraocular movements intact. No       vertical/horizontal deficits     VF's ok  Cardiovascular:      Rate and Rhythm: Normal rate and regular rhythm.   Pulmonary:      Effort: Pulmonary effort is normal.      Breath sounds: Normal breath sounds.   Musculoskeletal:         General: Normal range of motion.   Skin:     General: Skin is warm and dry.   Neurological:      General: No focal deficit present.      Mental Status:     Speech: dysarthric at times     Face: symmetric; tongue midline     Motor: nonlateralizing; slow to raise from the chair     F to N bradykinetic     R wrist cogwheel rigidity with potentiation     Impaired Heather R>L     Reflexes: symmetric, equal B " kjs and ajs     Tremor: subtle intermittent R hand rest tremor     Vib nml     No ankle clonus     Gait unassisted, bradykinetic, short stride, no arm swing, turn en bloc with       loss of balance during the turn [reached for wall], no tremor  Psychiatric:         Mood and Affect: Mood normal.         Behavior: Behavior normal.     Dr. Sykes physically present in exam room  during patient encounter.           Assessment/Plan:   Parkinsonism    DaTScan discussed    Start CD/LD 25/100 mg tab    Week 1: 0.5 tab morning and evening   Week 2 and beyond: 1 tab morning and evening    Reminded patient/family about potential PD med side effects, which include but not limited to impulse control disorders (ex: pathologic gambling, shopping, or preoccupation with sexual thoughts or behaviors), excessive daytime sleepiness, hallucinations and sleep attacks. Discussed that driving may pose an increased risk to patients on these medications. Hypotension is also occasionally associated with Parkinson's medications. Any of these complications should be reported to the prescribing physician or provider so that appropriate further modifications can occur.    Fall precautions discussed    Driving discussed    Follow up in 4-6 weeks      Follow up in about 4 weeks (around 6/14/2023).      Saul Herrera, MSN, APRN, AGACNP-BC

## 2023-05-17 NOTE — PROGRESS NOTES
"Subjective:       Patient ID: Clifford Warren is a 78 y.o. male.    ----------------------------  ADHD (attention deficit hyperactivity disorder)  Depression  Diabetes mellitus, type 2  [place] (hard of hearing)  Loss of equilibrium  S/P epidural steroid injection  Sleep apnea, unspecified      Comment:  "not using Cpap"  Smoker     Chief Complaint: clinic visit (Elevated HR during PT /constipation)    At recent PT session, pt had persistent -120 even after resting for several minutes. Physical therapist expressed concern about this, prompting today's visit. No CP, palpitations, SOB.     Also c/o constipation. Occasionally takes Miralax and fiber supplement. He's only drinking about 16 oz water daily. Drinks coffee every morning and several glasses of tea throughout day. Dehydration likely contributing to constipation and tachycardia.     Review of Systems   Constitutional:  Negative for chills and fever.   HENT:  Negative for sinus pressure/congestion and sore throat.    Respiratory:  Negative for cough, shortness of breath and wheezing.    Cardiovascular:  Negative for chest pain and leg swelling.   Gastrointestinal:  Positive for constipation. Negative for abdominal pain, nausea and vomiting.   Genitourinary:  Negative for dysuria and hematuria.   Musculoskeletal:  Negative for arthralgias and myalgias.   Integumentary:  Negative for rash.   Neurological:  Negative for dizziness and syncope.   Hematological:  Negative for adenopathy.   Psychiatric/Behavioral:  Positive for confusion. The patient is not nervous/anxious.          Objective:      Physical Exam  Vitals and nursing note reviewed.   Constitutional:       General: He is not in acute distress.  HENT:      Head: Normocephalic.      Nose: No rhinorrhea.   Eyes:      Conjunctiva/sclera: Conjunctivae normal.      Pupils: Pupils are equal, round, and reactive to light.   Cardiovascular:      Rate and Rhythm: Normal rate and regular rhythm. "   Pulmonary:      Effort: Pulmonary effort is normal.      Breath sounds: Normal breath sounds.   Abdominal:      Palpations: Abdomen is soft.      Tenderness: There is no abdominal tenderness.   Musculoskeletal:         General: No deformity or signs of injury.   Skin:     General: Skin is warm and dry.   Neurological:      General: No focal deficit present.      Mental Status: He is alert. Mental status is at baseline.   Psychiatric:         Mood and Affect: Mood normal.         Behavior: Behavior normal.         Assessment & Plan:   1. Tachycardia  Comments:  EKG today Sinus Tach (90s) no ST changes. Very likely related to dehydration. Counseled on hydration (see below). ED precautions discussed  Orders:  -     POCT EKG 12-LEAD (Manually Resulted by Ordering Provider)    2. Chronic constipation  Assessment & Plan:  Likely largely dehydration related.  Counseled to increase water intake to minimum 40 oz daily preferably 50-60 oz.   Decrease tea/coffee intake (both diuretic effect).  Recommended daily docusate sodium. Miralax PRN for no BM in 24 hrs.         Keep scheduled f/u. In addition to their scheduled follow up, the patient has also been instructed to follow up on as needed basis.

## 2023-05-30 PROBLEM — K59.09 CHRONIC CONSTIPATION: Status: ACTIVE | Noted: 2023-05-30

## 2023-05-30 NOTE — ASSESSMENT & PLAN NOTE
Likely largely dehydration related.  Counseled to increase water intake to minimum 40 oz daily preferably 50-60 oz.   Decrease tea/coffee intake (both diuretic effect).  Recommended daily docusate sodium. Miralax PRN for no BM in 24 hrs.

## 2023-06-19 ENCOUNTER — OFFICE VISIT (OUTPATIENT)
Dept: NEUROLOGY | Facility: CLINIC | Age: 78
End: 2023-06-19
Payer: MEDICARE

## 2023-06-19 VITALS
DIASTOLIC BLOOD PRESSURE: 76 MMHG | WEIGHT: 170 LBS | SYSTOLIC BLOOD PRESSURE: 122 MMHG | HEIGHT: 68 IN | BODY MASS INDEX: 25.76 KG/M2

## 2023-06-19 DIAGNOSIS — G20.C PARKINSONISM, UNSPECIFIED PARKINSONISM TYPE: Primary | ICD-10-CM

## 2023-06-19 PROCEDURE — 99213 OFFICE O/P EST LOW 20 MIN: CPT | Mod: PBBFAC | Performed by: NURSE PRACTITIONER

## 2023-06-19 PROCEDURE — 99214 PR OFFICE/OUTPT VISIT, EST, LEVL IV, 30-39 MIN: ICD-10-PCS | Mod: S$PBB,,, | Performed by: NURSE PRACTITIONER

## 2023-06-19 PROCEDURE — 99214 OFFICE O/P EST MOD 30 MIN: CPT | Mod: S$PBB,,, | Performed by: NURSE PRACTITIONER

## 2023-06-19 PROCEDURE — 99999 PR PBB SHADOW E&M-EST. PATIENT-LVL III: CPT | Mod: PBBFAC,,, | Performed by: NURSE PRACTITIONER

## 2023-06-19 PROCEDURE — 99999 PR PBB SHADOW E&M-EST. PATIENT-LVL III: ICD-10-PCS | Mod: PBBFAC,,, | Performed by: NURSE PRACTITIONER

## 2023-06-19 RX ORDER — POLYETHYLENE GLYCOL 3350 17 G/17G
17 POWDER, FOR SOLUTION ORAL DAILY PRN
COMMUNITY
End: 2024-01-30

## 2023-06-19 RX ORDER — CARBIDOPA AND LEVODOPA 25; 100 MG/1; MG/1
1 TABLET ORAL 3 TIMES DAILY
Qty: 270 TABLET | Refills: 3 | Status: SHIPPED | OUTPATIENT
Start: 2023-06-19 | End: 2023-09-13

## 2023-06-19 RX ORDER — BISACODYL 5 MG
5 TABLET, DELAYED RELEASE (ENTERIC COATED) ORAL DAILY PRN
COMMUNITY
End: 2024-01-30

## 2023-06-19 NOTE — Clinical Note
Please reach out to patient regarding the Flomax; he is reporting symptoms consistent with orthostatic hypotension - of course may be combo of PD and CD/LD as well.

## 2023-06-19 NOTE — PROGRESS NOTES
"  Neurology Follow up Note    Subjective:         Patient ID: Clifford Warren is a 78 y.o. male.    Chief Complaint: 4 week PD, memory decline f/u     HPI:            Pt reports tremors are well controlled since starting medication; taking CD/LD  mg (1 tab BID).     Some unsteady gait after awakening in mornings.     No diff swallowing or choking.     Memory has "improved"    Currently at [facility] for PT     C/o dizziness when going from laying to standing or sitting to standing.    Has urinary incontinence    CD/LD 25/100 mg tab  1 tab every morning and at bed time    MRIb in 2022  TSH in 2022 was nml     ROS: as per HPI, otherwise pertinent systems review is negative          Past Medical History:   Diagnosis Date    ADHD (attention deficit hyperactivity disorder)     Depression     Diabetes mellitus, type 2     [place] (hard of hearing)     Loss of equilibrium     S/P epidural steroid injection     Sleep apnea, unspecified     "not using Cpap"    Smoker        Past Surgical History:   Procedure Laterality Date    CATARACT EXTRACTION, BILATERAL  2021    CHOLECYSTECTOMY  2005    COLONOSCOPY      EYE SURGERY      HERNIA REPAIR      TONSILLECTOMY  1950       Family History   Problem Relation Age of Onset    Diabetes Mother     Hearing loss Mother     Stroke Mother     Cancer Father         bladder ca    Dementia Father     Depression Father     Learning disabilities Sister        Social History     Socioeconomic History    Marital status:    Tobacco Use    Smoking status: Every Day     Packs/day: 1.00     Years: 15.00     Pack years: 15.00     Types: Cigarettes     Passive exposure: Current    Smokeless tobacco: Never   Substance and Sexual Activity    Alcohol use: Not Currently    Drug use: Never    Sexual activity: Not Currently     Partners: Female     Birth control/protection: None     Social Determinants of Health     Financial Resource Strain: Low Risk     Difficulty of Paying Living Expenses: " "Not hard at all   Physical Activity: Inactive    Days of Exercise per Week: 0 days    Minutes of Exercise per Session: 0 min   Housing Stability: Low Risk     Unable to Pay for Housing in the Last Year: No    Number of Places Lived in the Last Year: 1    Unstable Housing in the Last Year: No       Review of patient's allergies indicates:  No Known Allergies    Current Outpatient Medications   Medication Instructions    acetaminophen (TYLENOL) 325 mg, Oral, Every 6 hours PRN    aspirin 81 MG Chew 1 tab(s)    bisacodyL (DULCOLAX) 5 mg, Oral, Daily PRN    blood-glucose meter kit Other, Use as instructed <BR> Accu-check guide<BR>Accu-check fastclix lancets    carbidopa-levodopa  mg (SINEMET)  mg per tablet 1 tablet, Oral, 3 times daily, 0.5 tab every morning and every night for 1 week then 1 tab every morning and every night thereafter    dulaglutide (TRULICITY) 1.5 mg/0.5 mL pen injector Subcutaneous, Every 7 days    finasteride (PROSCAR) 5 mg, Oral, Daily    fluticasone propionate (FLONASE) 50 mcg, Each Nostril, 2 times daily    JARDIANCE 25 mg, Oral    meloxicam (MOBIC) 7.5 MG tablet TAKE 1 TO 2 TABLETS BY MOUTH EVERY DAY WITH FOOD AS NEEDED FOR BACK PAIN    polyethylene glycol (GLYCOLAX) 17 g, Oral, Daily    rosuvastatin (CRESTOR) 20 mg, Oral, Nightly    SUTAB 1.479-0.188- 0.225 gram tablet Oral    tamsulosin (FLOMAX) 0.4 mg, Oral, Daily       Objective:      Exam:   Visit Vitals  /76 (BP Location: Left arm, Patient Position: Sitting)   Ht 5' 8" (1.727 m)   Wt 77.1 kg (170 lb)   BMI 25.85 kg/m²       Physical Exam  Vitals reviewed.   Constitutional:       Appearance: Parkinsonian; decreased eye blink and mask face;      bradykinetic     Accompanied by: wife  HENT:      Ears:      Comments: [place]; requires hearing aids but did not have today  Eyes:      Extraocular Movements: Extraocular movements intact. No       vertical/horizontal deficits     VF's ok  Cardiovascular:      Rate and Rhythm: Normal " rate and regular rhythm.   Pulmonary:      Effort: Pulmonary effort is normal.      Breath sounds: Normal breath sounds.   Musculoskeletal:         General: Normal range of motion.   Skin:     General: Skin is warm and dry.   Neurological:      General: No focal deficit present.      Mental Status:     Speech: dysarthric at times     Face: symmetric; tongue midline     Motor: nonlateralizing; slow to raise from the chair     F to N bradykinetic     R wrist cogwheel rigidity with potentiation     Impaired Heather R>L     Tremor: subtle intermittent R hand rest tremor     Gait unassisted, bradykinetic, short stride, no arm swing, turn en bloc with       loss of balance during the turn [reached for wall], no tremor  Psychiatric:         Mood and Affect: Mood normal.         Behavior: Behavior normal.         Assessment/Plan:   1. Parkinsonism, unspecified Parkinsonism type  Increase CD/LD dose - add dose at noon for total of 1 tab three times per day    Reminded patient/family about potential PD med side effects, which include but not limited to impulse control disorders (ex: pathologic gambling, shopping, or preoccupation with sexual thoughts or behaviors), excessive daytime sleepiness, hallucinations and sleep attacks. Discussed that driving may pose an increased risk to patients on these medications. Hypotension is also occasionally associated with Parkinson's medications. Any of these complications should be reported to the prescribing physician or provider so that appropriate further modifications can occur.    Fall precautions discussed      2. Orthostatic hypotension  Discussed risk of orthostatic hypotension when taking Flomax; consider stopping - will send rec to urologist for follow up.     Follow up in about 2 months (around 8/19/2023), or if symptoms worsen or fail to improve, for Parkinson's Disease.      Saul Herrera, MSN, APRN, AGACNP-BC

## 2023-07-10 ENCOUNTER — OFFICE VISIT (OUTPATIENT)
Dept: PRIMARY CARE CLINIC | Facility: CLINIC | Age: 78
End: 2023-07-10
Payer: MEDICARE

## 2023-07-10 VITALS
BODY MASS INDEX: 25.31 KG/M2 | SYSTOLIC BLOOD PRESSURE: 134 MMHG | HEIGHT: 68 IN | DIASTOLIC BLOOD PRESSURE: 78 MMHG | HEART RATE: 98 BPM | RESPIRATION RATE: 19 BRPM | OXYGEN SATURATION: 96 % | WEIGHT: 167 LBS

## 2023-07-10 DIAGNOSIS — K59.09 CHRONIC CONSTIPATION: Primary | ICD-10-CM

## 2023-07-10 DIAGNOSIS — M54.42 CHRONIC LEFT-SIDED LOW BACK PAIN WITH LEFT-SIDED SCIATICA: ICD-10-CM

## 2023-07-10 DIAGNOSIS — E78.2 MIXED HYPERLIPIDEMIA: ICD-10-CM

## 2023-07-10 DIAGNOSIS — E11.65 TYPE 2 DIABETES MELLITUS WITH HYPERGLYCEMIA, WITHOUT LONG-TERM CURRENT USE OF INSULIN: ICD-10-CM

## 2023-07-10 DIAGNOSIS — G89.29 CHRONIC LEFT-SIDED LOW BACK PAIN WITH LEFT-SIDED SCIATICA: ICD-10-CM

## 2023-07-10 DIAGNOSIS — R26.89 LOSS OF BALANCE: ICD-10-CM

## 2023-07-10 PROCEDURE — 99214 PR OFFICE/OUTPT VISIT, EST, LEVL IV, 30-39 MIN: ICD-10-PCS | Mod: ,,, | Performed by: STUDENT IN AN ORGANIZED HEALTH CARE EDUCATION/TRAINING PROGRAM

## 2023-07-10 PROCEDURE — 99214 OFFICE O/P EST MOD 30 MIN: CPT | Mod: ,,, | Performed by: STUDENT IN AN ORGANIZED HEALTH CARE EDUCATION/TRAINING PROGRAM

## 2023-07-10 NOTE — PROGRESS NOTES
"Subjective:       Patient ID: Clifford Warren is a 78 y.o. male.    ----------------------------  ADHD (attention deficit hyperactivity disorder)  Depression  Diabetes mellitus, type 2  [place] (hard of hearing)  Loss of equilibrium  S/P epidural steroid injection  Sleep apnea, unspecified      Comment:  "not using Cpap"  Smoker     Chief Complaint: 3m f/u and Back Pain    Constipation - using stool softener daily (4-6 tabs), Dulcolax daily, Miralax PRN. Has increased water intake and tried fiber supplements. Still goes 3-4 days between BM's. Hard stool often followed by diarrhea.    Dizziness has improved some but still present especially upon standing.     Review of Systems   Constitutional:  Negative for chills and fever.   HENT:  Negative for sinus pressure/congestion and sore throat.    Respiratory:  Negative for cough, shortness of breath and wheezing.    Cardiovascular:  Negative for chest pain and leg swelling.   Gastrointestinal:  Positive for constipation. Negative for abdominal pain, nausea and vomiting.   Genitourinary:  Negative for dysuria and hematuria.   Musculoskeletal:  Negative for arthralgias and myalgias.   Integumentary:  Negative for rash.   Neurological:  Positive for dizziness. Negative for syncope.   Hematological:  Negative for adenopathy.   Psychiatric/Behavioral:  Positive for confusion. The patient is not nervous/anxious.          Objective:      Physical Exam  Vitals and nursing note reviewed.   Constitutional:       General: He is not in acute distress.  HENT:      Head: Normocephalic.      Nose: No rhinorrhea.   Eyes:      Conjunctiva/sclera: Conjunctivae normal.      Pupils: Pupils are equal, round, and reactive to light.   Cardiovascular:      Rate and Rhythm: Normal rate and regular rhythm.   Pulmonary:      Effort: Pulmonary effort is normal.      Breath sounds: Normal breath sounds.   Abdominal:      Palpations: Abdomen is soft.      Tenderness: There is no abdominal " tenderness.   Musculoskeletal:         General: No deformity or signs of injury.   Skin:     General: Skin is warm and dry.   Neurological:      General: No focal deficit present.      Mental Status: He is alert. Mental status is at baseline.   Psychiatric:         Mood and Affect: Mood normal.         Behavior: Behavior normal.         Assessment & Plan:   1. Chronic constipation  Assessment & Plan:  Pt increased water intake. Has tried docusate sodium, Miralax, bisacodyl. Still going 3-4 days w/o BMs.  Sending Rx for Linzess. Counseled on potential side effects.    Orders:  -     linaCLOtide (LINZESS) 145 mcg Cap capsule; Take 1 capsule (145 mcg total) by mouth before breakfast.  Dispense: 30 capsule; Refill: 11    2. Chronic left-sided low back pain with left-sided sciatica  Assessment & Plan:  Went to PT. Seemed to help strength, balance, etc but back pain actually worse now. Mobic not providing much relief.   See recent MRI  Referring to pain management to consider injection treatment.    Orders:  -     Ambulatory referral/consult to Pain Clinic    3. Loss of balance  Assessment & Plan:  Trial of holding Flomax. Pt will report any worsening of urinary symptoms (will continue finasteride).          Follow up in about 4 months (around 11/10/2023) for Wellness. In addition to their scheduled follow up, the patient has also been instructed to follow up on as needed basis.

## 2023-07-19 ENCOUNTER — TELEPHONE (OUTPATIENT)
Dept: PRIMARY CARE CLINIC | Facility: CLINIC | Age: 78
End: 2023-07-19
Payer: MEDICARE

## 2023-07-19 DIAGNOSIS — K59.09 CHRONIC CONSTIPATION: Primary | ICD-10-CM

## 2023-07-19 NOTE — TELEPHONE ENCOUNTER
Must have been a misunderstanding. Should only be taking 1 Linzess capsule per day currently.     If not working, then we can increase to Linzess 290 mcg (the highest dose). Sending that Rx now.    Let us know if not effective.    Edu Morrow MD

## 2023-07-19 NOTE — TELEPHONE ENCOUNTER
Spoke with patient wife , ID Done , states patient inform her that he was instructed to take 2 capsule of Linzness; and that not what RX indicates ; patient is still having constipation with 1 capsule , Please review and advise.       Thanks

## 2023-07-19 NOTE — TELEPHONE ENCOUNTER
----- Message from Teresa Lo sent at 7/19/2023 12:12 PM CDT -----  Regarding: call back  .Type:  Needs Medical Advice    Who Called: pt wife  Symptoms (please be specific):    How long has patient had these symptoms:    Pharmacy name and phone #:    Would the patient rather a call back or a response via [facility]? Call back  Best Call Back Number: [phone]  Additional Information: pt is requesting a call back about med linaCLOtide (LINZESS) 145 mcg Cap capsule

## 2023-07-31 ENCOUNTER — OFFICE VISIT (OUTPATIENT)
Dept: PAIN MEDICINE | Facility: CLINIC | Age: 78
End: 2023-07-31
Payer: MEDICARE

## 2023-07-31 VITALS
TEMPERATURE: 98 F | DIASTOLIC BLOOD PRESSURE: 85 MMHG | HEIGHT: 68 IN | HEART RATE: 106 BPM | SYSTOLIC BLOOD PRESSURE: 128 MMHG | WEIGHT: 167 LBS | BODY MASS INDEX: 25.31 KG/M2

## 2023-07-31 DIAGNOSIS — M43.16 ANTEROLISTHESIS OF LUMBAR SPINE: ICD-10-CM

## 2023-07-31 DIAGNOSIS — M47.816 LUMBAR FACET ARTHROPATHY: ICD-10-CM

## 2023-07-31 DIAGNOSIS — G89.29 CHRONIC LEFT-SIDED LOW BACK PAIN WITH LEFT-SIDED SCIATICA: Primary | ICD-10-CM

## 2023-07-31 DIAGNOSIS — M54.42 CHRONIC LEFT-SIDED LOW BACK PAIN WITH LEFT-SIDED SCIATICA: Primary | ICD-10-CM

## 2023-07-31 PROCEDURE — 99204 OFFICE O/P NEW MOD 45 MIN: CPT | Mod: ,,, | Performed by: NURSE PRACTITIONER

## 2023-07-31 PROCEDURE — 99204 PR OFFICE/OUTPT VISIT, NEW, LEVL IV, 45-59 MIN: ICD-10-PCS | Mod: ,,, | Performed by: NURSE PRACTITIONER

## 2023-07-31 NOTE — PROGRESS NOTES
"Subjective:      Patient ID: Clifford Warren is a 78 y.o. male.    Chief Complaint: Low-back Pain (Referred by Dr Hull, left side low back pain and sciatica, no prior injury or surgery, had injection in the past, completed P.T. at  [facility], tylenol and meloxicam for relief, pain level 6/10)    Referred by: Edu Morrow MD     HPI:  This is a 78-year-old  male patient presenting as a new consult for chronic left-sided low back pain with left-sided sciatica. Primary pain is located to lumbar axial spine. Pain started 10 years ago without injury.  Four years ago this pain was exacerbated after using a reciprocal saw.  Pain is located to left side of his low back and worse with lateral twisting at the waist.  This will radiate posteriorly to his left thigh and stops at the left knee.  This pain feels like a stabbing and burning sensation.His most pressing pain is in the lumbar axial spine    He has no neurosurgical intervention only 1 prior lumbar epidural steroid injection in the past that did not help.  He completed 2 months of physical therapy that did help quite a lot however he is not performing the physical therapy exercises at home.  His current pain score today is 6/10.  This will elevate with standing, walking, twisting at waist and he is no longer able to play golf.  These activities will increase his pain to 09/10/2010.  His pain does not wake him up.  The lowest his pain will reduced to is 3-5/10 and this is when he changes position sitting reclined or lying down.  He takes a low dose of Mobic every day and occasional Advil as well as Tylenol 500 mg daily.  He has no neurosurgical interventions to the low back.  MRI of his low back shows notable facet arthropathy to L4/L5 and L5/S1.    Vital signs:   Vitals:    07/31/23 1023   BP: 128/85   Pulse: 106   Temp: 98 °F (36.7 °C)   TempSrc: Oral   Weight: 75.8 kg (167 lb)   Height: 5' 8" (1.727 m)   PainSc:   6     Body mass index is " 25.39 kg/m².  Pain Disability Index (PDI): 45       Interventional Pain History  Prior lumbar KARINA in 2020.     ROS: Left low back and thigh pain    Lumbar MRI 2023:   FINDINGS:  6 mm of anterolisthesis is present at L5-S1.  Lumbar spine alignment is otherwise anatomic.  The lumbar lordosis is mildly exaggerated.  Vertebral body heights are maintained.  The discs are desiccated.  The disc space narrowing is moderate at L1-L2 and L5-S1.  A 12 mm vertebral body hemangioma is present at L2.  No worrisome marrow signal abnormality is seen. The distal spinal cord and conus are within normal limits, with conus terminating at L1.     L1-L2: Disc space narrowing is moderate.  Circumferential annular disc bulge is mild.  No spinal canal stenosis or foraminal stenosis is observed.     L2-L3: A small dorsal central zone annular fissure is present.  No significant disc bulge or herniation is observed.  Facet arthropathy is moderate, with small facet joint effusions.     L3-L4: No disc bulge or herniation is observed.  Ligamentum flavum thickening and facet arthropathy are moderate, with small facet joint effusions.  There is no spinal canal stenosis or foraminal stenosis.     L4-L5: No disc bulge, herniation, spinal canal, or foraminal stenosis is observed.  Facet arthropathy is moderate, with a small right facet joint effusion.     L5-S1: Grade 1 anterolisthesis is again noted, with disc uncovering.  Facet arthropathy is advanced, with small facet joint effusions.  No significant spinal canal stenosis or left foraminal stenosis is observed.  Right foraminal stenosis is mild.     Impression:     L1-L2 moderate disc disease.     L2-L3 small dorsal central zone annular fissure, moderate facet arthropathy, and small facet joint effusions.     L3-L4 moderate facet arthropathy, with small facet joint effusions.     L4-L5 moderate facet arthropathy, with small right facet joint effusion.     L5-S1 grade 1 anterolisthesis, advanced  facet arthropathy, small facet joint effusions, and mild right foraminal stenosis.            Objective:          Physical Exam  General: Well developed; normal weight; A&O x 3; No anxiety/depression; NAD  Mental Status: Oriented to person, palce and time. Displays appropriate mood & affect.  Head: Norm cephalic and atraumatic  Neck:  No cervical paraspinal banding.  Full range of motion with lateral turning and cervical flexion +extension.  Eyes: normal conjunctiva, normal lids, normal pupils  ENT and mouth: normal external ear, nose, and no lesions noted on the lips.  Respiratory: Symmetrical, Unlabored. No dyspnea  CV: normal rhythm and rate. No peripheral edema.   Abdomen: Non-distended    Extremities:  Gen: No cyanosis or tenderness to palpation bilateral upper and lower extremities  Skin: Warm, pink, dry, no rashes, no lesions on the lumbar spine  Strength: 5/5 motor strength bilateral upper and lower extremities  ROM: Full ROM in bilateral knees and ankles without pain or instability.    Neuro:  Gait: no altalgic lean, normal toe and heel raise. Independent ambulator.  DTR's: 2+ in bilateral patellar, and ankle  Sensory: Intact to light touch bilateral  upper and lower extremities    Spine: Normal lordosis. No scoliosis  L-spine ROM: Full ROM to flexion, pain and limited range of motion with extension and bilateral rotation,   Straight Leg Raise:  Negative bilaterally  SI Joint: No tenderness to palpation bilaterally.             Assessment:     A/P; Notable pain to left lower back radiating down left posterior thigh. His most pressing pain is in the lumbar axial spine and is worse with any activity requiring lateral twisting at the waist.  This will elevate his pain notably to his left low back  .    MRI Lumbar Spine notes:  L4-L5:  Facet arthropathy is moderate, with a small right facet joint effusion.   L5-S1:  Facet arthropathy is advanced, with small facet joint effusions.  No significant spinal canal  "stenosis or left foraminal stenosis is observed.  Right foraminal stenosis is mild.    Left medial branch blocks to L3-L5 requested    Patient to follow-up preop with Dr. Turcios    Encounter Diagnoses   Name Primary?    Chronic left-sided low back pain with left-sided sciatica Yes    Lumbar facet arthropathy     Anterolisthesis of lumbar spine          Plan:       Clifford was seen today for low-back pain.    Diagnoses and all orders for this visit:    Chronic left-sided low back pain with left-sided sciatica    Lumbar facet arthropathy    Anterolisthesis of lumbar spine         Past Medical History:   Diagnosis Date    ADHD (attention deficit hyperactivity disorder)     Depression     Diabetes mellitus, type 2     [place] (hard of hearing)     Loss of equilibrium     S/P epidural steroid injection     Sleep apnea, unspecified     "not using Cpap"    Smoker        Past Surgical History:   Procedure Laterality Date    CATARACT EXTRACTION, BILATERAL      CHOLECYSTECTOMY      COLONOSCOPY      EYE SURGERY      HERNIA REPAIR      TONSILLECTOMY  1950       Family History   Problem Relation Age of Onset    Diabetes Mother     Hearing loss Mother     Stroke Mother     Cancer Father         bladder ca    Dementia Father     Depression Father     Learning disabilities Sister        Social History     Socioeconomic History    Marital status:    Tobacco Use    Smoking status: Former     Current packs/day: 0.00     Average packs/day: 1 pack/day for 15.0 years (15.0 ttl pk-yrs)     Types: Cigarettes     Start date:      Quit date:      Years since quittin.5     Passive exposure: Current    Smokeless tobacco: Never   Substance and Sexual Activity    Alcohol use: Not Currently    Drug use: Never    Sexual activity: Not Currently     Partners: Female     Birth control/protection: None     Social Determinants of Health     Financial Resource Strain: Low Risk  (4/10/2023)    Overall Financial Resource Strain " (CARDIA)     Difficulty of Paying Living Expenses: Not hard at all   Physical Activity: Inactive (4/10/2023)    Exercise Vital Sign     Days of Exercise per Week: 0 days     Minutes of Exercise per Session: 0 min   Housing Stability: Low Risk  (4/10/2023)    Housing Stability Vital Sign     Unable to Pay for Housing in the Last Year: No     Number of Places Lived in the Last Year: 1     Unstable Housing in the Last Year: No       Current Outpatient Medications   Medication Sig Dispense Refill    acetaminophen (TYLENOL) 325 MG tablet Take 325 mg by mouth every 6 (six) hours as needed for Pain.      aspirin 81 MG Chew 1 tab(s)      bisacodyL (DULCOLAX) 5 mg EC tablet Take 5 mg by mouth daily as needed for Constipation.      blood-glucose meter kit by Other route. Use as instructed    Accu-check guide  Accu-check fastclix lancets      carbidopa-levodopa  mg (SINEMET)  mg per tablet Take 1 tablet by mouth 3 (three) times daily. 0.5 tab every morning and every night for 1 week then 1 tab every morning and every night thereafter 270 tablet 3    dulaglutide (TRULICITY) 1.5 mg/0.5 mL pen injector Inject into the skin every 7 days.      finasteride (PROSCAR) 5 mg tablet Take 1 tablet (5 mg total) by mouth once daily. 30 tablet 11    fluticasone propionate (FLONASE) 50 mcg/actuation nasal spray 1 spray (50 mcg total) by Each Nostril route 2 (two) times a day. 15.8 mL 11    JARDIANCE 25 mg tablet Take 25 mg by mouth.      linaCLOtide (LINZESS) 290 mcg Cap capsule Take 1 capsule (290 mcg total) by mouth before breakfast. 30 capsule 11    meloxicam (MOBIC) 7.5 MG tablet TAKE 1 TO 2 TABLETS BY MOUTH EVERY DAY WITH FOOD AS NEEDED FOR BACK PAIN 60 tablet 1    polyethylene glycol (GLYCOLAX) 17 gram/dose powder Take 17 g by mouth once daily.      rosuvastatin (CRESTOR) 20 MG tablet Take 1 tablet (20 mg total) by mouth every evening. 90 tablet 3    SUTAB 1.479-0.188- 0.225 gram tablet Take by mouth.       No current  "facility-administered medications for this visit.       Review of patient's allergies indicates:  No Known Allergies        Past Medical History:   Diagnosis Date    ADHD (attention deficit hyperactivity disorder)     Depression     Diabetes mellitus, type 2     [place] (hard of hearing)     Loss of equilibrium     S/P epidural steroid injection     Sleep apnea, unspecified     "not using Cpap"    Smoker        Past Surgical History:   Procedure Laterality Date    CATARACT EXTRACTION, BILATERAL      CHOLECYSTECTOMY      COLONOSCOPY      EYE SURGERY      HERNIA REPAIR      TONSILLECTOMY  1950       Family History   Problem Relation Age of Onset    Diabetes Mother     Hearing loss Mother     Stroke Mother     Cancer Father         bladder ca    Dementia Father     Depression Father     Learning disabilities Sister        Social History     Socioeconomic History    Marital status:    Tobacco Use    Smoking status: Former     Current packs/day: 0.00     Average packs/day: 1 pack/day for 15.0 years (15.0 ttl pk-yrs)     Types: Cigarettes     Start date:      Quit date:      Years since quittin.5     Passive exposure: Current    Smokeless tobacco: Never   Substance and Sexual Activity    Alcohol use: Not Currently    Drug use: Never    Sexual activity: Not Currently     Partners: Female     Birth control/protection: None     Social Determinants of Health     Financial Resource Strain: Low Risk  (4/10/2023)    Overall Financial Resource Strain (CARDIA)     Difficulty of Paying Living Expenses: Not hard at all   Physical Activity: Inactive (4/10/2023)    Exercise Vital Sign     Days of Exercise per Week: 0 days     Minutes of Exercise per Session: 0 min   Housing Stability: Low Risk  (4/10/2023)    Housing Stability Vital Sign     Unable to Pay for Housing in the Last Year: No     Number of Places Lived in the Last Year: 1     Unstable Housing in the Last Year: No       Current Outpatient Medications "   Medication Sig Dispense Refill    acetaminophen (TYLENOL) 325 MG tablet Take 325 mg by mouth every 6 (six) hours as needed for Pain.      aspirin 81 MG Chew 1 tab(s)      bisacodyL (DULCOLAX) 5 mg EC tablet Take 5 mg by mouth daily as needed for Constipation.      blood-glucose meter kit by Other route. Use as instructed    Accu-check guide  Accu-check fastclix lancets      carbidopa-levodopa  mg (SINEMET)  mg per tablet Take 1 tablet by mouth 3 (three) times daily. 0.5 tab every morning and every night for 1 week then 1 tab every morning and every night thereafter 270 tablet 3    dulaglutide (TRULICITY) 1.5 mg/0.5 mL pen injector Inject into the skin every 7 days.      finasteride (PROSCAR) 5 mg tablet Take 1 tablet (5 mg total) by mouth once daily. 30 tablet 11    fluticasone propionate (FLONASE) 50 mcg/actuation nasal spray 1 spray (50 mcg total) by Each Nostril route 2 (two) times a day. 15.8 mL 11    JARDIANCE 25 mg tablet Take 25 mg by mouth.      linaCLOtide (LINZESS) 290 mcg Cap capsule Take 1 capsule (290 mcg total) by mouth before breakfast. 30 capsule 11    meloxicam (MOBIC) 7.5 MG tablet TAKE 1 TO 2 TABLETS BY MOUTH EVERY DAY WITH FOOD AS NEEDED FOR BACK PAIN 60 tablet 1    polyethylene glycol (GLYCOLAX) 17 gram/dose powder Take 17 g by mouth once daily.      rosuvastatin (CRESTOR) 20 MG tablet Take 1 tablet (20 mg total) by mouth every evening. 90 tablet 3    SUTAB 1.479-0.188- 0.225 gram tablet Take by mouth.       No current facility-administered medications for this visit.       Review of patient's allergies indicates:  No Known Allergies

## 2023-08-01 ENCOUNTER — TELEPHONE (OUTPATIENT)
Dept: PRIMARY CARE CLINIC | Facility: CLINIC | Age: 78
End: 2023-08-01
Payer: MEDICARE

## 2023-08-01 DIAGNOSIS — K59.09 CHRONIC CONSTIPATION: Primary | ICD-10-CM

## 2023-08-01 NOTE — TELEPHONE ENCOUNTER
Continues to have severe  constipation.   Ongoing issue with constipation.   Reports linzees 290 mcg he is more regular but  stool is very hard.   Reports to having GI dr.     Please advise if next dose can be used per wife.   Did advise wife dr is out this afternoon.  Will give to dr once he returns clinic.

## 2023-08-01 NOTE — TELEPHONE ENCOUNTER
----- Message from Erika Cruz sent at 8/1/2023  2:59 PM CDT -----  Regarding: Needs Med Advice  .Type:  Needs Medical Advice    Who Called: pt's wife- Ria  Symptoms (please be specific): constipation    How long has patient had these symptoms:  1 week  Pharmacy name and phone #:  [facility] in [place]   Would the patient rather a call back or a response via [facility]?   Best Call Back Number: [phone]  Additional Information: pt's wife states her  is still having constipation and would like med advise

## 2023-08-02 RX ORDER — LUBIPROSTONE 24 UG/1
24 CAPSULE ORAL 2 TIMES DAILY WITH MEALS
Qty: 60 CAPSULE | Refills: 11 | Status: SHIPPED | OUTPATIENT
Start: 2023-08-02

## 2023-08-02 NOTE — TELEPHONE ENCOUNTER
Ria notified. Verbalizes understanding.   Ok with referral.   Referral forwarded to Dr. Jose Ramon Rea.

## 2023-08-02 NOTE — TELEPHONE ENCOUNTER
Sending Rx for Amitiza (lubiprostone). Take 1 capsule twice daily with meals. Discontinue Linzess.     He needs a GI appointment. Sending new referral to Dr. Jose Ramon Helton (who did his colonoscopy earlier this year).    Edu Morrow MD

## 2023-08-16 ENCOUNTER — OFFICE VISIT (OUTPATIENT)
Dept: NEUROLOGY | Facility: CLINIC | Age: 78
End: 2023-08-16
Payer: MEDICARE

## 2023-08-16 VITALS
WEIGHT: 162 LBS | DIASTOLIC BLOOD PRESSURE: 74 MMHG | SYSTOLIC BLOOD PRESSURE: 116 MMHG | HEIGHT: 68 IN | BODY MASS INDEX: 24.55 KG/M2

## 2023-08-16 DIAGNOSIS — F41.9 ANXIETY: ICD-10-CM

## 2023-08-16 DIAGNOSIS — M54.16 LUMBAR RADICULAR PAIN: ICD-10-CM

## 2023-08-16 DIAGNOSIS — G89.29 CHRONIC LEFT-SIDED LOW BACK PAIN WITH LEFT-SIDED SCIATICA: ICD-10-CM

## 2023-08-16 DIAGNOSIS — M54.42 CHRONIC LEFT-SIDED LOW BACK PAIN WITH LEFT-SIDED SCIATICA: ICD-10-CM

## 2023-08-16 DIAGNOSIS — G20.C PARKINSONISM, UNSPECIFIED PARKINSONISM TYPE: Primary | ICD-10-CM

## 2023-08-16 PROCEDURE — 99999 PR PBB SHADOW E&M-EST. PATIENT-LVL III: ICD-10-PCS | Mod: PBBFAC,,, | Performed by: NURSE PRACTITIONER

## 2023-08-16 PROCEDURE — 99214 PR OFFICE/OUTPT VISIT, EST, LEVL IV, 30-39 MIN: ICD-10-PCS | Mod: S$PBB,,, | Performed by: NURSE PRACTITIONER

## 2023-08-16 PROCEDURE — 99999 PR PBB SHADOW E&M-EST. PATIENT-LVL III: CPT | Mod: PBBFAC,,, | Performed by: NURSE PRACTITIONER

## 2023-08-16 PROCEDURE — 99214 OFFICE O/P EST MOD 30 MIN: CPT | Mod: S$PBB,,, | Performed by: NURSE PRACTITIONER

## 2023-08-16 PROCEDURE — 99213 OFFICE O/P EST LOW 20 MIN: CPT | Mod: PBBFAC | Performed by: NURSE PRACTITIONER

## 2023-08-16 RX ORDER — DULOXETIN HYDROCHLORIDE 30 MG/1
30 CAPSULE, DELAYED RELEASE ORAL DAILY
Qty: 90 CAPSULE | Refills: 1 | Status: SHIPPED | OUTPATIENT
Start: 2023-08-16 | End: 2024-02-12

## 2023-08-16 RX ORDER — MELOXICAM 7.5 MG/1
TABLET ORAL
Qty: 60 TABLET | Refills: 1 | Status: SHIPPED | OUTPATIENT
Start: 2023-08-16 | End: 2023-10-22

## 2023-08-16 NOTE — PROGRESS NOTES
"  Neurology Follow up Note    Subjective:         Patient ID: Clifford Warren is a 78 y.o. male.    Chief Complaint: 2 month PD f/u    HPI:            Pt reports no tremors since last visit; unsteady gait unchanged, denies recent falls; has completed PT.     Dizziness when going from laying to standing unchanged; has stopped flomax.     Taking Sinemet 25/100 mg (1 tab TID)    ROS: as per HPI, otherwise pertinent systems review is negative          Past Medical History:   Diagnosis Date    ADHD (attention deficit hyperactivity disorder)     Depression     Diabetes mellitus, type 2     [place] (hard of hearing)     Loss of equilibrium     S/P epidural steroid injection     Sleep apnea, unspecified     "not using Cpap"    Smoker        Past Surgical History:   Procedure Laterality Date    CATARACT EXTRACTION, BILATERAL      CHOLECYSTECTOMY      COLONOSCOPY      EYE SURGERY      HERNIA REPAIR      TONSILLECTOMY  1950       Family History   Problem Relation Age of Onset    Diabetes Mother     Hearing loss Mother     Stroke Mother     Cancer Father         bladder ca    Dementia Father     Depression Father     Learning disabilities Sister        Social History     Socioeconomic History    Marital status:    Tobacco Use    Smoking status: Former     Current packs/day: 0.00     Average packs/day: 1 pack/day for 15.0 years (15.0 ttl pk-yrs)     Types: Cigarettes     Start date:      Quit date:      Years since quittin.6     Passive exposure: Current    Smokeless tobacco: Never   Substance and Sexual Activity    Alcohol use: Not Currently    Drug use: Never    Sexual activity: Not Currently     Partners: Female     Birth control/protection: None     Social Determinants of Health     Financial Resource Strain: Low Risk  (4/10/2023)    Overall Financial Resource Strain (CARDIA)     Difficulty of Paying Living Expenses: Not hard at all   Physical Activity: Inactive (4/10/2023)    Exercise Vital " "Sign     Days of Exercise per Week: 0 days     Minutes of Exercise per Session: 0 min   Housing Stability: Low Risk  (4/10/2023)    Housing Stability Vital Sign     Unable to Pay for Housing in the Last Year: No     Number of Places Lived in the Last Year: 1     Unstable Housing in the Last Year: No       Review of patient's allergies indicates:  No Known Allergies    Current Outpatient Medications   Medication Instructions    acetaminophen (TYLENOL) 325 mg, Oral, Every 6 hours PRN    aspirin 81 MG Chew 1 tab(s)    bisacodyL (DULCOLAX) 5 mg, Oral, Daily PRN    blood-glucose meter kit Other, Use as instructed <BR> Accu-check guide<BR>Accu-check fastclix lancets    carbidopa-levodopa  mg (SINEMET)  mg per tablet 1 tablet, Oral, 3 times daily, 0.5 tab every morning and every night for 1 week then 1 tab every morning and every night thereafter    dulaglutide (TRULICITY) 1.5 mg/0.5 mL pen injector Subcutaneous, Every 7 days    finasteride (PROSCAR) 5 mg, Oral, Daily    fluticasone propionate (FLONASE) 50 mcg, Each Nostril, 2 times daily    JARDIANCE 25 mg, Oral    lubiprostone (AMITIZA) 24 mcg, Oral, 2 times daily with meals    meloxicam (MOBIC) 7.5 MG tablet TAKE 1 TO 2 TABLETS BY MOUTH EVERY DAY WITH FOOD AS NEEDED FOR BACK PAIN    polyethylene glycol (GLYCOLAX) 17 g, Oral, Daily    rosuvastatin (CRESTOR) 20 mg, Oral, Nightly    SUTAB 1.479-0.188- 0.225 gram tablet Oral       Objective:      Exam:   Visit Vitals  /74 (BP Location: Left arm, Patient Position: Sitting)   Ht 5' 8" (1.727 m)   Wt 73.5 kg (162 lb)   BMI 24.63 kg/m²       Physical Exam  Vitals reviewed.   Constitutional:       Appearance: Parkinsonian; decreased eye blink and mask face;      bradykinetic     Accompanied by: wife  HENT:      Ears:      Comments: [place]; requires hearing aids but did not have today  Eyes:      Extraocular Movements: Extraocular movements intact. No       vertical/horizontal deficits     VF's ok  Cardiovascular: "      Rate and Rhythm: Normal rate and regular rhythm.   Pulmonary:      Effort: Pulmonary effort is normal.      Breath sounds: Normal breath sounds.   Musculoskeletal:         General: Normal range of motion.   Skin:     General: Skin is warm and dry.   Neurological:      General: No focal deficit present.      Mental Status:     Speech: dysarthric at times     Face: symmetric; tongue midline     Motor: nonlateralizing; slow to raise from the chair     F to N bradykinetic     R wrist cogwheel rigidity with potentiation     Impaired Heather R>L     Tremor: subtle intermittent R hand rest tremor     Gait unassisted, bradykinetic, short stride, no arm swing, turn en bloc with     loss of balance during the turn [reached for wall], no tremor  Psychiatric:         Mood and Affect: Mood normal.         Behavior: Behavior normal.         Assessment/Plan:   1. Parkinsonism, unspecified Parkinsonism type  Continue CD/LD 25/100 mg tab, 1 tab three times per day    Reminded patient/family about potential PD med side effects, which include but not limited to impulse control disorders (ex: pathologic gambling, shopping, or preoccupation with sexual thoughts or behaviors), excessive daytime sleepiness, hallucinations and sleep attacks. Discussed that driving may pose an increased risk to patients on these medications. Hypotension is also occasionally associated with Parkinson's medications. Any of these complications should be reported to the prescribing physician or provider so that appropriate further modifications can occur.    Fall precautions discussed    Driving discussed    Consider/discussed addition of Duloxetine [help mood and pain syndrome]; they wish to proceed. Start 30 mg daily, rx generated; Medication administration personally reviewed with patient by MD/provider. Potential or actual medication changes discussed. Common and potentially serious side effects of medications or medication changes discussed.      Follow  up in about 3 months (around 11/16/2023), or if symptoms worsen or fail to improve, for Parkinson's Disease.      Saul Herrera, MSN, APRN, AGACNP-BC

## 2023-08-24 ENCOUNTER — OFFICE VISIT (OUTPATIENT)
Dept: PAIN MEDICINE | Facility: CLINIC | Age: 78
End: 2023-08-24
Payer: MEDICARE

## 2023-08-24 VITALS
WEIGHT: 162 LBS | DIASTOLIC BLOOD PRESSURE: 82 MMHG | HEART RATE: 114 BPM | BODY MASS INDEX: 24.55 KG/M2 | TEMPERATURE: 98 F | SYSTOLIC BLOOD PRESSURE: 133 MMHG | HEIGHT: 68 IN

## 2023-08-24 DIAGNOSIS — M25.512 LEFT SHOULDER PAIN, UNSPECIFIED CHRONICITY: Primary | ICD-10-CM

## 2023-08-24 DIAGNOSIS — M47.816 LUMBAR SPONDYLOSIS: ICD-10-CM

## 2023-08-24 DIAGNOSIS — M54.42 CHRONIC LEFT-SIDED LOW BACK PAIN WITH LEFT-SIDED SCIATICA: Primary | ICD-10-CM

## 2023-08-24 DIAGNOSIS — G89.29 CHRONIC LEFT-SIDED LOW BACK PAIN WITH LEFT-SIDED SCIATICA: Primary | ICD-10-CM

## 2023-08-24 PROCEDURE — 99213 OFFICE O/P EST LOW 20 MIN: CPT | Mod: ,,, | Performed by: ANESTHESIOLOGY

## 2023-08-24 PROCEDURE — 99213 PR OFFICE/OUTPT VISIT, EST, LEVL III, 20-29 MIN: ICD-10-PCS | Mod: ,,, | Performed by: ANESTHESIOLOGY

## 2023-08-24 NOTE — PROGRESS NOTES
Patient's wife, who is also a patient in this office, reported that the patient is continued to have L shoulder pain from a fall several weeks ago.  He is requesting L shoulder x-ray.  Order placed    Edu Morrow MD

## 2023-08-24 NOTE — H&P (VIEW-ONLY)
"Subjective:      Patient ID: Clifford Warren is a 78 y.o. male.    Chief Complaint: Low-back Pain (Pre-op for MBB LT L3-L5)    Referred by: No ref. provider found       Low-back Pain  :  This is a 78-year-old  male patient presenting for chronic left-sided low back pain with left-sided sciatica.  Started 10 years ago without injury.  Four years ago this pain was exacerbated after using a reciprocal saw.  Pain is located to left side of his low back and worse with lateral twisting at the waist.  This will radiate posteriorly to his left thigh and stops at the left knee.  This pain feels like a stabbing and burning sensation.    He has no neurosurgical intervention only 1 prior lumbar epidural steroid injection in the past that did not help.  He completed 2 months of physical therapy that did help quite a lot however he is not performing the physical therapy exercises at home.  His current pain score today is 6/10.  This will elevate with standing, walking, twisting at waist and he is no longer able to play golf.  These activities will increase his pain to 09/10/2010.  His pain does not wake him up.  The lowest his pain will reduced to is 3-5/10 and this is when he changes position sitting reclined or lying down.  He takes a low dose of Mobic every day and occasional Advil as well as Tylenol 500 mg daily.  He has no neurosurgical interventions to the low back.  MRI of his low back shows notable facet arthropathy to L4/L5 and L5/S1.    Vital signs:   Vitals:    08/24/23 1318   BP: 133/82   Pulse: (!) 114   Temp: 97.5 °F (36.4 °C)   Weight: 73.5 kg (162 lb)   Height: 5' 8" (1.727 m)   PainSc:   5     Body mass index is 24.63 kg/m².  Pain Disability Index (PDI): 50       Interventional Pain History  Prior lumbar KARINA in 2020.     ROS: Left low back and thigh pain    Lumbar MRI 2023:   FINDINGS:  6 mm of anterolisthesis is present at L5-S1.  Lumbar spine alignment is otherwise anatomic.  The lumbar " lordosis is mildly exaggerated.  Vertebral body heights are maintained.  The discs are desiccated.  The disc space narrowing is moderate at L1-L2 and L5-S1.  A 12 mm vertebral body hemangioma is present at L2.  No worrisome marrow signal abnormality is seen. The distal spinal cord and conus are within normal limits, with conus terminating at L1.     L1-L2: Disc space narrowing is moderate.  Circumferential annular disc bulge is mild.  No spinal canal stenosis or foraminal stenosis is observed.     L2-L3: A small dorsal central zone annular fissure is present.  No significant disc bulge or herniation is observed.  Facet arthropathy is moderate, with small facet joint effusions.     L3-L4: No disc bulge or herniation is observed.  Ligamentum flavum thickening and facet arthropathy are moderate, with small facet joint effusions.  There is no spinal canal stenosis or foraminal stenosis.     L4-L5: No disc bulge, herniation, spinal canal, or foraminal stenosis is observed.  Facet arthropathy is moderate, with a small right facet joint effusion.     L5-S1: Grade 1 anterolisthesis is again noted, with disc uncovering.  Facet arthropathy is advanced, with small facet joint effusions.  No significant spinal canal stenosis or left foraminal stenosis is observed.  Right foraminal stenosis is mild.     Impression:     L1-L2 moderate disc disease.     L2-L3 small dorsal central zone annular fissure, moderate facet arthropathy, and small facet joint effusions.     L3-L4 moderate facet arthropathy, with small facet joint effusions.     L4-L5 moderate facet arthropathy, with small right facet joint effusion.     L5-S1 grade 1 anterolisthesis, advanced facet arthropathy, small facet joint effusions, and mild right foraminal stenosis.            Objective:          Physical Exam  General: Well developed; normal weight; A&O x 3; No anxiety/depression; NAD  Mental Status: Oriented to person, palce and time. Displays appropriate mood &  affect.  Head: Norm cephalic and atraumatic  Neck:  No cervical paraspinal banding.  Full range of motion with lateral turning and cervical flexion +extension.  Eyes: normal conjunctiva, normal lids, normal pupils  ENT and mouth: normal external ear, nose, and no lesions noted on the lips.  Respiratory: Symmetrical, Unlabored. No dyspnea  CV: normal rhythm and rate. No peripheral edema.   Abdomen: Non-distended    Extremities:  Gen: No cyanosis or tenderness to palpation bilateral upper and lower extremities  Skin: Warm, pink, dry, no rashes, no lesions on the lumbar spine  Strength: 5/5 motor strength bilateral upper and lower extremities  ROM: Full ROM in bilateral knees and ankles without pain or instability.    Neuro:  Gait: no altalgic lean, normal toe and heel raise. Independent ambulator.  DTR's: 2+ in bilateral patellar, and ankle  Sensory: Intact to light touch bilateral  upper and lower extremities    Spine: Normal lordosis. No scoliosis  L-spine ROM: Full ROM to flexion, pain and limited range of motion with extension and bilateral rotation,   Straight Leg Raise:  Negative bilaterally  SI Joint: No tenderness to palpation bilaterally.             Assessment:     A/P; Notable pain to left lower back radiating down left posterior thigh.   MRI Lumbar Spine notes:  L4-L5:  Facet arthropathy is moderate, with a small right facet joint effusion.   L5-S1:  Facet arthropathy is advanced, with small facet joint effusions.  No significant spinal canal stenosis or left foraminal stenosis is observed.  Right foraminal stenosis is mild.      Encounter Diagnoses   Name Primary?    Chronic left-sided low back pain with left-sided sciatica Yes    Lumbar spondylosis          Plan:       Clifford was seen today for low-back pain.    Diagnoses and all orders for this visit:    Chronic left-sided low back pain with left-sided sciatica    Lumbar spondylosis    Schedule left L3-5 diagnostic medial branch blocks to treat his  "chronic axial low back pain, consistent with findings of facet hypertrophy on his MRI and + facet loading on exam. If he has >80% relief from 2 sets of blocks, we will proceed to radiofrequency ablation. The patient voices understanding and is in agreement with the plan.     Past Medical History:   Diagnosis Date    ADHD (attention deficit hyperactivity disorder)     Depression     Diabetes mellitus, type 2     [place] (hard of hearing)     Loss of equilibrium     S/P epidural steroid injection     Sleep apnea, unspecified     "not using Cpap"    Smoker        Past Surgical History:   Procedure Laterality Date    CATARACT EXTRACTION, BILATERAL      CHOLECYSTECTOMY      COLONOSCOPY      EYE SURGERY      HERNIA REPAIR      TONSILLECTOMY  1950       Family History   Problem Relation Age of Onset    Diabetes Mother     Hearing loss Mother     Stroke Mother     Cancer Father         bladder ca    Dementia Father     Depression Father     Learning disabilities Sister        Social History     Socioeconomic History    Marital status:    Tobacco Use    Smoking status: Former     Current packs/day: 0.00     Average packs/day: 1 pack/day for 15.0 years (15.0 ttl pk-yrs)     Types: Cigarettes     Start date:      Quit date:      Years since quittin.6     Passive exposure: Current    Smokeless tobacco: Never   Substance and Sexual Activity    Alcohol use: Not Currently    Drug use: Never    Sexual activity: Not Currently     Partners: Female     Birth control/protection: None     Social Determinants of Health     Financial Resource Strain: Low Risk  (4/10/2023)    Overall Financial Resource Strain (CARDIA)     Difficulty of Paying Living Expenses: Not hard at all   Physical Activity: Inactive (4/10/2023)    Exercise Vital Sign     Days of Exercise per Week: 0 days     Minutes of Exercise per Session: 0 min   Housing Stability: Low Risk  (4/10/2023)    Housing Stability Vital Sign     Unable to Pay for " Housing in the Last Year: No     Number of Places Lived in the Last Year: 1     Unstable Housing in the Last Year: No       Current Outpatient Medications   Medication Sig Dispense Refill    acetaminophen (TYLENOL) 325 MG tablet Take 325 mg by mouth every 6 (six) hours as needed for Pain.      aspirin 81 MG Chew 1 tab(s)      bisacodyL (DULCOLAX) 5 mg EC tablet Take 5 mg by mouth daily as needed for Constipation.      blood-glucose meter kit by Other route. Use as instructed    Accu-check guide  Accu-check fastclix lancets      carbidopa-levodopa  mg (SINEMET)  mg per tablet Take 1 tablet by mouth 3 (three) times daily. 0.5 tab every morning and every night for 1 week then 1 tab every morning and every night thereafter 270 tablet 3    dulaglutide (TRULICITY) 1.5 mg/0.5 mL pen injector Inject into the skin every 7 days.      DULoxetine (CYMBALTA) 30 MG capsule Take 1 capsule (30 mg total) by mouth once daily. 90 capsule 1    finasteride (PROSCAR) 5 mg tablet Take 1 tablet (5 mg total) by mouth once daily. 30 tablet 11    fluticasone propionate (FLONASE) 50 mcg/actuation nasal spray 1 spray (50 mcg total) by Each Nostril route 2 (two) times a day. 15.8 mL 11    JARDIANCE 25 mg tablet Take 25 mg by mouth.      lubiprostone (AMITIZA) 24 MCG Cap Take 1 capsule (24 mcg total) by mouth 2 (two) times daily with meals. 60 capsule 11    meloxicam (MOBIC) 7.5 MG tablet TAKE 1 TO 2 TABLETS BY MOUTH EVERY DAY WITH FOOD AS NEEDED FOR BACK PAIN 60 tablet 1    polyethylene glycol (GLYCOLAX) 17 gram/dose powder Take 17 g by mouth once daily.      rosuvastatin (CRESTOR) 20 MG tablet Take 1 tablet (20 mg total) by mouth every evening. 90 tablet 3    SUTAB 1.479-0.188- 0.225 gram tablet Take by mouth.       No current facility-administered medications for this visit.       Review of patient's allergies indicates:  No Known Allergies        Past Medical History:   Diagnosis Date    ADHD (attention deficit hyperactivity  "disorder)     Depression     Diabetes mellitus, type 2     [place] (hard of hearing)     Loss of equilibrium     S/P epidural steroid injection     Sleep apnea, unspecified     "not using Cpap"    Smoker        Past Surgical History:   Procedure Laterality Date    CATARACT EXTRACTION, BILATERAL      CHOLECYSTECTOMY      COLONOSCOPY      EYE SURGERY      HERNIA REPAIR      TONSILLECTOMY  1950       Family History   Problem Relation Age of Onset    Diabetes Mother     Hearing loss Mother     Stroke Mother     Cancer Father         bladder ca    Dementia Father     Depression Father     Learning disabilities Sister        Social History     Socioeconomic History    Marital status:    Tobacco Use    Smoking status: Former     Current packs/day: 0.00     Average packs/day: 1 pack/day for 15.0 years (15.0 ttl pk-yrs)     Types: Cigarettes     Start date:      Quit date:      Years since quittin.6     Passive exposure: Current    Smokeless tobacco: Never   Substance and Sexual Activity    Alcohol use: Not Currently    Drug use: Never    Sexual activity: Not Currently     Partners: Female     Birth control/protection: None     Social Determinants of Health     Financial Resource Strain: Low Risk  (4/10/2023)    Overall Financial Resource Strain (CARDIA)     Difficulty of Paying Living Expenses: Not hard at all   Physical Activity: Inactive (4/10/2023)    Exercise Vital Sign     Days of Exercise per Week: 0 days     Minutes of Exercise per Session: 0 min   Housing Stability: Low Risk  (4/10/2023)    Housing Stability Vital Sign     Unable to Pay for Housing in the Last Year: No     Number of Places Lived in the Last Year: 1     Unstable Housing in the Last Year: No       Current Outpatient Medications   Medication Sig Dispense Refill    acetaminophen (TYLENOL) 325 MG tablet Take 325 mg by mouth every 6 (six) hours as needed for Pain.      aspirin 81 MG Chew 1 tab(s)      bisacodyL (DULCOLAX) 5 mg EC " tablet Take 5 mg by mouth daily as needed for Constipation.      blood-glucose meter kit by Other route. Use as instructed    Accu-check guide  Accu-check fastclix lancets      carbidopa-levodopa  mg (SINEMET)  mg per tablet Take 1 tablet by mouth 3 (three) times daily. 0.5 tab every morning and every night for 1 week then 1 tab every morning and every night thereafter 270 tablet 3    dulaglutide (TRULICITY) 1.5 mg/0.5 mL pen injector Inject into the skin every 7 days.      DULoxetine (CYMBALTA) 30 MG capsule Take 1 capsule (30 mg total) by mouth once daily. 90 capsule 1    finasteride (PROSCAR) 5 mg tablet Take 1 tablet (5 mg total) by mouth once daily. 30 tablet 11    fluticasone propionate (FLONASE) 50 mcg/actuation nasal spray 1 spray (50 mcg total) by Each Nostril route 2 (two) times a day. 15.8 mL 11    JARDIANCE 25 mg tablet Take 25 mg by mouth.      lubiprostone (AMITIZA) 24 MCG Cap Take 1 capsule (24 mcg total) by mouth 2 (two) times daily with meals. 60 capsule 11    meloxicam (MOBIC) 7.5 MG tablet TAKE 1 TO 2 TABLETS BY MOUTH EVERY DAY WITH FOOD AS NEEDED FOR BACK PAIN 60 tablet 1    polyethylene glycol (GLYCOLAX) 17 gram/dose powder Take 17 g by mouth once daily.      rosuvastatin (CRESTOR) 20 MG tablet Take 1 tablet (20 mg total) by mouth every evening. 90 tablet 3    SUTAB 1.479-0.188- 0.225 gram tablet Take by mouth.       No current facility-administered medications for this visit.       Review of patient's allergies indicates:  No Known Allergies

## 2023-08-24 NOTE — PROGRESS NOTES
"Subjective:      Patient ID: Clifford Warren is a 78 y.o. male.    Chief Complaint: Low-back Pain (Pre-op for MBB LT L3-L5)    Referred by: No ref. provider found       Low-back Pain  :  This is a 78-year-old  male patient presenting for chronic left-sided low back pain with left-sided sciatica.  Started 10 years ago without injury.  Four years ago this pain was exacerbated after using a reciprocal saw.  Pain is located to left side of his low back and worse with lateral twisting at the waist.  This will radiate posteriorly to his left thigh and stops at the left knee.  This pain feels like a stabbing and burning sensation.    He has no neurosurgical intervention only 1 prior lumbar epidural steroid injection in the past that did not help.  He completed 2 months of physical therapy that did help quite a lot however he is not performing the physical therapy exercises at home.  His current pain score today is 6/10.  This will elevate with standing, walking, twisting at waist and he is no longer able to play golf.  These activities will increase his pain to 09/10/2010.  His pain does not wake him up.  The lowest his pain will reduced to is 3-5/10 and this is when he changes position sitting reclined or lying down.  He takes a low dose of Mobic every day and occasional Advil as well as Tylenol 500 mg daily.  He has no neurosurgical interventions to the low back.  MRI of his low back shows notable facet arthropathy to L4/L5 and L5/S1.    Vital signs:   Vitals:    08/24/23 1318   BP: 133/82   Pulse: (!) 114   Temp: 97.5 °F (36.4 °C)   Weight: 73.5 kg (162 lb)   Height: 5' 8" (1.727 m)   PainSc:   5     Body mass index is 24.63 kg/m².  Pain Disability Index (PDI): 50       Interventional Pain History  Prior lumbar KARINA in 2020.     ROS: Left low back and thigh pain    Lumbar MRI 2023:   FINDINGS:  6 mm of anterolisthesis is present at L5-S1.  Lumbar spine alignment is otherwise anatomic.  The lumbar " lordosis is mildly exaggerated.  Vertebral body heights are maintained.  The discs are desiccated.  The disc space narrowing is moderate at L1-L2 and L5-S1.  A 12 mm vertebral body hemangioma is present at L2.  No worrisome marrow signal abnormality is seen. The distal spinal cord and conus are within normal limits, with conus terminating at L1.     L1-L2: Disc space narrowing is moderate.  Circumferential annular disc bulge is mild.  No spinal canal stenosis or foraminal stenosis is observed.     L2-L3: A small dorsal central zone annular fissure is present.  No significant disc bulge or herniation is observed.  Facet arthropathy is moderate, with small facet joint effusions.     L3-L4: No disc bulge or herniation is observed.  Ligamentum flavum thickening and facet arthropathy are moderate, with small facet joint effusions.  There is no spinal canal stenosis or foraminal stenosis.     L4-L5: No disc bulge, herniation, spinal canal, or foraminal stenosis is observed.  Facet arthropathy is moderate, with a small right facet joint effusion.     L5-S1: Grade 1 anterolisthesis is again noted, with disc uncovering.  Facet arthropathy is advanced, with small facet joint effusions.  No significant spinal canal stenosis or left foraminal stenosis is observed.  Right foraminal stenosis is mild.     Impression:     L1-L2 moderate disc disease.     L2-L3 small dorsal central zone annular fissure, moderate facet arthropathy, and small facet joint effusions.     L3-L4 moderate facet arthropathy, with small facet joint effusions.     L4-L5 moderate facet arthropathy, with small right facet joint effusion.     L5-S1 grade 1 anterolisthesis, advanced facet arthropathy, small facet joint effusions, and mild right foraminal stenosis.            Objective:          Physical Exam  General: Well developed; normal weight; A&O x 3; No anxiety/depression; NAD  Mental Status: Oriented to person, palce and time. Displays appropriate mood &  affect.  Head: Norm cephalic and atraumatic  Neck:  No cervical paraspinal banding.  Full range of motion with lateral turning and cervical flexion +extension.  Eyes: normal conjunctiva, normal lids, normal pupils  ENT and mouth: normal external ear, nose, and no lesions noted on the lips.  Respiratory: Symmetrical, Unlabored. No dyspnea  CV: normal rhythm and rate. No peripheral edema.   Abdomen: Non-distended    Extremities:  Gen: No cyanosis or tenderness to palpation bilateral upper and lower extremities  Skin: Warm, pink, dry, no rashes, no lesions on the lumbar spine  Strength: 5/5 motor strength bilateral upper and lower extremities  ROM: Full ROM in bilateral knees and ankles without pain or instability.    Neuro:  Gait: no altalgic lean, normal toe and heel raise. Independent ambulator.  DTR's: 2+ in bilateral patellar, and ankle  Sensory: Intact to light touch bilateral  upper and lower extremities    Spine: Normal lordosis. No scoliosis  L-spine ROM: Full ROM to flexion, pain and limited range of motion with extension and bilateral rotation,   Straight Leg Raise:  Negative bilaterally  SI Joint: No tenderness to palpation bilaterally.             Assessment:     A/P; Notable pain to left lower back radiating down left posterior thigh.   MRI Lumbar Spine notes:  L4-L5:  Facet arthropathy is moderate, with a small right facet joint effusion.   L5-S1:  Facet arthropathy is advanced, with small facet joint effusions.  No significant spinal canal stenosis or left foraminal stenosis is observed.  Right foraminal stenosis is mild.      Encounter Diagnoses   Name Primary?    Chronic left-sided low back pain with left-sided sciatica Yes    Lumbar spondylosis          Plan:       Clifford was seen today for low-back pain.    Diagnoses and all orders for this visit:    Chronic left-sided low back pain with left-sided sciatica    Lumbar spondylosis    Schedule left L3-5 diagnostic medial branch blocks to treat his  "chronic axial low back pain, consistent with findings of facet hypertrophy on his MRI and + facet loading on exam. If he has >80% relief from 2 sets of blocks, we will proceed to radiofrequency ablation. The patient voices understanding and is in agreement with the plan.     Past Medical History:   Diagnosis Date    ADHD (attention deficit hyperactivity disorder)     Depression     Diabetes mellitus, type 2     [place] (hard of hearing)     Loss of equilibrium     S/P epidural steroid injection     Sleep apnea, unspecified     "not using Cpap"    Smoker        Past Surgical History:   Procedure Laterality Date    CATARACT EXTRACTION, BILATERAL      CHOLECYSTECTOMY      COLONOSCOPY      EYE SURGERY      HERNIA REPAIR      TONSILLECTOMY  1950       Family History   Problem Relation Age of Onset    Diabetes Mother     Hearing loss Mother     Stroke Mother     Cancer Father         bladder ca    Dementia Father     Depression Father     Learning disabilities Sister        Social History     Socioeconomic History    Marital status:    Tobacco Use    Smoking status: Former     Current packs/day: 0.00     Average packs/day: 1 pack/day for 15.0 years (15.0 ttl pk-yrs)     Types: Cigarettes     Start date:      Quit date:      Years since quittin.6     Passive exposure: Current    Smokeless tobacco: Never   Substance and Sexual Activity    Alcohol use: Not Currently    Drug use: Never    Sexual activity: Not Currently     Partners: Female     Birth control/protection: None     Social Determinants of Health     Financial Resource Strain: Low Risk  (4/10/2023)    Overall Financial Resource Strain (CARDIA)     Difficulty of Paying Living Expenses: Not hard at all   Physical Activity: Inactive (4/10/2023)    Exercise Vital Sign     Days of Exercise per Week: 0 days     Minutes of Exercise per Session: 0 min   Housing Stability: Low Risk  (4/10/2023)    Housing Stability Vital Sign     Unable to Pay for " Housing in the Last Year: No     Number of Places Lived in the Last Year: 1     Unstable Housing in the Last Year: No       Current Outpatient Medications   Medication Sig Dispense Refill    acetaminophen (TYLENOL) 325 MG tablet Take 325 mg by mouth every 6 (six) hours as needed for Pain.      aspirin 81 MG Chew 1 tab(s)      bisacodyL (DULCOLAX) 5 mg EC tablet Take 5 mg by mouth daily as needed for Constipation.      blood-glucose meter kit by Other route. Use as instructed    Accu-check guide  Accu-check fastclix lancets      carbidopa-levodopa  mg (SINEMET)  mg per tablet Take 1 tablet by mouth 3 (three) times daily. 0.5 tab every morning and every night for 1 week then 1 tab every morning and every night thereafter 270 tablet 3    dulaglutide (TRULICITY) 1.5 mg/0.5 mL pen injector Inject into the skin every 7 days.      DULoxetine (CYMBALTA) 30 MG capsule Take 1 capsule (30 mg total) by mouth once daily. 90 capsule 1    finasteride (PROSCAR) 5 mg tablet Take 1 tablet (5 mg total) by mouth once daily. 30 tablet 11    fluticasone propionate (FLONASE) 50 mcg/actuation nasal spray 1 spray (50 mcg total) by Each Nostril route 2 (two) times a day. 15.8 mL 11    JARDIANCE 25 mg tablet Take 25 mg by mouth.      lubiprostone (AMITIZA) 24 MCG Cap Take 1 capsule (24 mcg total) by mouth 2 (two) times daily with meals. 60 capsule 11    meloxicam (MOBIC) 7.5 MG tablet TAKE 1 TO 2 TABLETS BY MOUTH EVERY DAY WITH FOOD AS NEEDED FOR BACK PAIN 60 tablet 1    polyethylene glycol (GLYCOLAX) 17 gram/dose powder Take 17 g by mouth once daily.      rosuvastatin (CRESTOR) 20 MG tablet Take 1 tablet (20 mg total) by mouth every evening. 90 tablet 3    SUTAB 1.479-0.188- 0.225 gram tablet Take by mouth.       No current facility-administered medications for this visit.       Review of patient's allergies indicates:  No Known Allergies        Past Medical History:   Diagnosis Date    ADHD (attention deficit hyperactivity  "disorder)     Depression     Diabetes mellitus, type 2     [place] (hard of hearing)     Loss of equilibrium     S/P epidural steroid injection     Sleep apnea, unspecified     "not using Cpap"    Smoker        Past Surgical History:   Procedure Laterality Date    CATARACT EXTRACTION, BILATERAL      CHOLECYSTECTOMY      COLONOSCOPY      EYE SURGERY      HERNIA REPAIR      TONSILLECTOMY  1950       Family History   Problem Relation Age of Onset    Diabetes Mother     Hearing loss Mother     Stroke Mother     Cancer Father         bladder ca    Dementia Father     Depression Father     Learning disabilities Sister        Social History     Socioeconomic History    Marital status:    Tobacco Use    Smoking status: Former     Current packs/day: 0.00     Average packs/day: 1 pack/day for 15.0 years (15.0 ttl pk-yrs)     Types: Cigarettes     Start date:      Quit date:      Years since quittin.6     Passive exposure: Current    Smokeless tobacco: Never   Substance and Sexual Activity    Alcohol use: Not Currently    Drug use: Never    Sexual activity: Not Currently     Partners: Female     Birth control/protection: None     Social Determinants of Health     Financial Resource Strain: Low Risk  (4/10/2023)    Overall Financial Resource Strain (CARDIA)     Difficulty of Paying Living Expenses: Not hard at all   Physical Activity: Inactive (4/10/2023)    Exercise Vital Sign     Days of Exercise per Week: 0 days     Minutes of Exercise per Session: 0 min   Housing Stability: Low Risk  (4/10/2023)    Housing Stability Vital Sign     Unable to Pay for Housing in the Last Year: No     Number of Places Lived in the Last Year: 1     Unstable Housing in the Last Year: No       Current Outpatient Medications   Medication Sig Dispense Refill    acetaminophen (TYLENOL) 325 MG tablet Take 325 mg by mouth every 6 (six) hours as needed for Pain.      aspirin 81 MG Chew 1 tab(s)      bisacodyL (DULCOLAX) 5 mg EC " tablet Take 5 mg by mouth daily as needed for Constipation.      blood-glucose meter kit by Other route. Use as instructed    Accu-check guide  Accu-check fastclix lancets      carbidopa-levodopa  mg (SINEMET)  mg per tablet Take 1 tablet by mouth 3 (three) times daily. 0.5 tab every morning and every night for 1 week then 1 tab every morning and every night thereafter 270 tablet 3    dulaglutide (TRULICITY) 1.5 mg/0.5 mL pen injector Inject into the skin every 7 days.      DULoxetine (CYMBALTA) 30 MG capsule Take 1 capsule (30 mg total) by mouth once daily. 90 capsule 1    finasteride (PROSCAR) 5 mg tablet Take 1 tablet (5 mg total) by mouth once daily. 30 tablet 11    fluticasone propionate (FLONASE) 50 mcg/actuation nasal spray 1 spray (50 mcg total) by Each Nostril route 2 (two) times a day. 15.8 mL 11    JARDIANCE 25 mg tablet Take 25 mg by mouth.      lubiprostone (AMITIZA) 24 MCG Cap Take 1 capsule (24 mcg total) by mouth 2 (two) times daily with meals. 60 capsule 11    meloxicam (MOBIC) 7.5 MG tablet TAKE 1 TO 2 TABLETS BY MOUTH EVERY DAY WITH FOOD AS NEEDED FOR BACK PAIN 60 tablet 1    polyethylene glycol (GLYCOLAX) 17 gram/dose powder Take 17 g by mouth once daily.      rosuvastatin (CRESTOR) 20 MG tablet Take 1 tablet (20 mg total) by mouth every evening. 90 tablet 3    SUTAB 1.479-0.188- 0.225 gram tablet Take by mouth.       No current facility-administered medications for this visit.       Review of patient's allergies indicates:  No Known Allergies

## 2023-08-25 ENCOUNTER — PATIENT MESSAGE (OUTPATIENT)
Dept: PRIMARY CARE CLINIC | Facility: CLINIC | Age: 78
End: 2023-08-25
Payer: MEDICARE

## 2023-08-25 RX ORDER — IBUPROFEN 200 MG
200 TABLET ORAL EVERY 6 HOURS PRN
COMMUNITY
End: 2023-10-22

## 2023-09-06 ENCOUNTER — ANESTHESIA EVENT (OUTPATIENT)
Dept: SURGERY | Facility: HOSPITAL | Age: 78
End: 2023-09-06
Payer: MEDICARE

## 2023-09-13 ENCOUNTER — ANESTHESIA (OUTPATIENT)
Dept: SURGERY | Facility: HOSPITAL | Age: 78
End: 2023-09-13
Payer: MEDICARE

## 2023-09-13 ENCOUNTER — HOSPITAL ENCOUNTER (OUTPATIENT)
Facility: HOSPITAL | Age: 78
Discharge: HOME | End: 2023-09-13
Attending: ANESTHESIOLOGY | Admitting: ANESTHESIOLOGY
Payer: MEDICARE

## 2023-09-13 DIAGNOSIS — G20.C PARKINSONISM, UNSPECIFIED PARKINSONISM TYPE: ICD-10-CM

## 2023-09-13 DIAGNOSIS — G89.29 CHRONIC BACK PAIN GREATER THAN 3 MONTHS DURATION: ICD-10-CM

## 2023-09-13 DIAGNOSIS — M54.9 CHRONIC BACK PAIN GREATER THAN 3 MONTHS DURATION: ICD-10-CM

## 2023-09-13 LAB — POCT GLUCOSE: 122 MG/DL (ref 70–110)

## 2023-09-13 PROCEDURE — 64494 INJ PARAVERT F JNT L/S 2 LEV: CPT | Mod: LT | Performed by: ANESTHESIOLOGY

## 2023-09-13 PROCEDURE — 25000003 PHARM REV CODE 250

## 2023-09-13 PROCEDURE — 63600175 PHARM REV CODE 636 W HCPCS

## 2023-09-13 PROCEDURE — 82962 GLUCOSE BLOOD TEST: CPT | Performed by: ANESTHESIOLOGY

## 2023-09-13 PROCEDURE — 64493 INJ PARAVERT F JNT L/S 1 LEV: CPT | Mod: LT,,, | Performed by: ANESTHESIOLOGY

## 2023-09-13 PROCEDURE — 37000008 HC ANESTHESIA 1ST 15 MINUTES: Performed by: ANESTHESIOLOGY

## 2023-09-13 PROCEDURE — D9220A PRA ANESTHESIA: Mod: 23,ANES,, | Performed by: ANESTHESIOLOGY

## 2023-09-13 PROCEDURE — 64494 PR INJ DX/THER AGNT PARAVERT FACET JOINT,IMG GUIDE,LUMBAR/SAC, 2ND LEVEL: ICD-10-PCS | Mod: LT,,, | Performed by: ANESTHESIOLOGY

## 2023-09-13 PROCEDURE — 64494 INJ PARAVERT F JNT L/S 2 LEV: CPT | Mod: LT,,, | Performed by: ANESTHESIOLOGY

## 2023-09-13 PROCEDURE — 63600175 PHARM REV CODE 636 W HCPCS: Performed by: ANESTHESIOLOGY

## 2023-09-13 PROCEDURE — D9220A PRA ANESTHESIA: ICD-10-PCS | Mod: 23,CRNA,,

## 2023-09-13 PROCEDURE — D9220A PRA ANESTHESIA: ICD-10-PCS | Mod: 23,ANES,, | Performed by: ANESTHESIOLOGY

## 2023-09-13 PROCEDURE — 64493 INJ PARAVERT F JNT L/S 1 LEV: CPT | Mod: LT | Performed by: ANESTHESIOLOGY

## 2023-09-13 PROCEDURE — D9220A PRA ANESTHESIA: Mod: 23,CRNA,,

## 2023-09-13 PROCEDURE — 25000003 PHARM REV CODE 250: Performed by: ANESTHESIOLOGY

## 2023-09-13 PROCEDURE — 64493 PR INJ DX/THER AGNT PARAVERT FACET JOINT,IMG GUIDE,LUMBAR/SAC,1ST LVL: ICD-10-PCS | Mod: LT,,, | Performed by: ANESTHESIOLOGY

## 2023-09-13 RX ORDER — LIDOCAINE HYDROCHLORIDE 10 MG/ML
INJECTION, SOLUTION EPIDURAL; INFILTRATION; INTRACAUDAL; PERINEURAL
Status: DISCONTINUED | OUTPATIENT
Start: 2023-09-13 | End: 2023-09-13 | Stop reason: HOSPADM

## 2023-09-13 RX ORDER — BUPIVACAINE HYDROCHLORIDE 2.5 MG/ML
INJECTION, SOLUTION EPIDURAL; INFILTRATION; INTRACAUDAL
Status: DISCONTINUED | OUTPATIENT
Start: 2023-09-13 | End: 2023-09-13 | Stop reason: HOSPADM

## 2023-09-13 RX ORDER — LIDOCAINE HYDROCHLORIDE 10 MG/ML
1 INJECTION, SOLUTION EPIDURAL; INFILTRATION; INTRACAUDAL; PERINEURAL ONCE
Status: DISCONTINUED | OUTPATIENT
Start: 2023-09-13 | End: 2023-09-13 | Stop reason: HOSPADM

## 2023-09-13 RX ORDER — PROPOFOL 10 MG/ML
VIAL (ML) INTRAVENOUS
Status: DISCONTINUED | OUTPATIENT
Start: 2023-09-13 | End: 2023-09-13

## 2023-09-13 RX ORDER — CARBIDOPA AND LEVODOPA 25; 100 MG/1; MG/1
1 TABLET ORAL 2 TIMES DAILY
Qty: 180 TABLET | Refills: 2 | Status: SHIPPED | OUTPATIENT
Start: 2023-09-13 | End: 2024-01-30 | Stop reason: SDUPTHER

## 2023-09-13 RX ORDER — TRIAMCINOLONE ACETONIDE 40 MG/ML
INJECTION, SUSPENSION INTRA-ARTICULAR; INTRAMUSCULAR
Status: DISCONTINUED
Start: 2023-09-13 | End: 2023-09-13 | Stop reason: HOSPADM

## 2023-09-13 RX ORDER — BUPIVACAINE HYDROCHLORIDE 2.5 MG/ML
INJECTION, SOLUTION EPIDURAL; INFILTRATION; INTRACAUDAL
Status: DISCONTINUED
Start: 2023-09-13 | End: 2023-09-13 | Stop reason: HOSPADM

## 2023-09-13 RX ORDER — LIDOCAINE HYDROCHLORIDE 10 MG/ML
INJECTION INFILTRATION; PERINEURAL
Status: DISCONTINUED
Start: 2023-09-13 | End: 2023-09-13 | Stop reason: HOSPADM

## 2023-09-13 RX ORDER — LIDOCAINE HYDROCHLORIDE 10 MG/ML
INJECTION, SOLUTION EPIDURAL; INFILTRATION; INTRACAUDAL; PERINEURAL
Status: DISCONTINUED | OUTPATIENT
Start: 2023-09-13 | End: 2023-09-13

## 2023-09-13 RX ORDER — SODIUM CHLORIDE, SODIUM GLUCONATE, SODIUM ACETATE, POTASSIUM CHLORIDE AND MAGNESIUM CHLORIDE 30; 37; 368; 526; 502 MG/100ML; MG/100ML; MG/100ML; MG/100ML; MG/100ML
INJECTION, SOLUTION INTRAVENOUS CONTINUOUS
Status: CANCELLED | OUTPATIENT
Start: 2023-09-13 | End: 2023-10-13

## 2023-09-13 RX ORDER — SODIUM CITRATE AND CITRIC ACID MONOHYDRATE 334; 500 MG/5ML; MG/5ML
30 SOLUTION ORAL
Status: CANCELLED | OUTPATIENT
Start: 2023-09-13

## 2023-09-13 RX ADMIN — PROPOFOL 50 MG: 10 INJECTION, EMULSION INTRAVENOUS at 11:09

## 2023-09-13 RX ADMIN — PROPOFOL 10 MG: 10 INJECTION, EMULSION INTRAVENOUS at 11:09

## 2023-09-13 RX ADMIN — LIDOCAINE HYDROCHLORIDE 50 MG: 10 INJECTION, SOLUTION EPIDURAL; INFILTRATION; INTRACAUDAL; PERINEURAL at 11:09

## 2023-09-13 NOTE — ANESTHESIA PREPROCEDURE EVALUATION
09/13/2023  Clifford Warren is a 78 y.o., male with parkinsons, dm, and other medical problems listed in the EMR      Pre-op Assessment    I have reviewed the Patient Summary Reports.     I have reviewed the Nursing Notes. I have reviewed the NPO Status.   I have reviewed the Medications.     Review of Systems      Physical Exam  General: Well nourished and Cooperative    Airway:  Mallampati: II   Mouth Opening: Normal  TM Distance: Normal  Tongue: Normal  Neck ROM: Normal ROM    Chest/Lungs:  Clear to auscultation    Heart:  Rhythm: Regular Rhythm        Anesthesia Plan  Type of Anesthesia, risks & benefits discussed:    Anesthesia Type: Gen Natural Airway  Intra-op Monitoring Plan: Standard ASA Monitors  Induction:  IV  Informed Consent: Informed consent signed with the Patient and all parties understand the risks and agree with anesthesia plan.  All questions answered.   ASA Score: 3  Day of Surgery Review of History & Physical: H&P Update referred to the surgeon/provider.    Ready For Surgery From Anesthesia Perspective.     .  Pt is here for a pain injection.  These usually only require MAC anesthesia.  Pain doctor requests IV GA.  Will provide this using IV propofol as needed.  The pain doctor has reviewed the patients meds including any anticoagulant meds.  I explained anesthesia plan to patient/responsbile party if available.  Anesthesia consent done going over the material facts, risks, complications & alternatives, obtained which includes the possibility of altering the anesthesia plan.  I reviewed problem list, appropriate labs, any workup, Xray, EKG etc noted below.  Patients condition is satisfactory to proceed with anesthesia plan unless otherwise noted (see anesthesia chart for details of the anesthesia plan carried out).      Pre-operative evaluation for Procedure(s)  "(LRB):  Block-nerve-medial branch-lumbar (Left)    Ht 5' 7.99" (1.727 m)   Wt 72.6 kg (160 lb)   BMI 24.33 kg/m²     Patient Active Problem List   Diagnosis    Loss of balance    Type 2 diabetes mellitus with hyperglycemia, without long-term current use of insulin    Mixed hyperlipidemia    Neurologic gait dysfunction    Parkinsonism    Mild cognitive impairment    Chronic left-sided low back pain with left-sided sciatica    Seasonal allergic rhinitis    Chronic constipation    Anxiety    Lumbar radicular pain    Lumbar spondylosis       Review of patient's allergies indicates:  No Known Allergies    Current Outpatient Medications   Medication Instructions    acetaminophen (TYLENOL) 325 mg, Oral, Every 6 hours PRN    aspirin 81 mg, Oral, Daily    bisacodyL (DULCOLAX) 5 mg, Oral, Daily PRN    blood-glucose meter kit Other, Use as instructed <BR> Accu-check guide<BR>Accu-check fastclix lancets    carbidopa-levodopa  mg (SINEMET)  mg per tablet 1 tablet, Oral, 2 times daily    dulaglutide (TRULICITY) 1.5 mg/0.5 mL pen injector Subcutaneous, Every 7 days    DULoxetine (CYMBALTA) 30 mg, Oral, Daily    finasteride (PROSCAR) 5 mg, Oral, Daily    fluticasone propionate (FLONASE) 50 mcg, Each Nostril, 2 times daily    ibuprofen (ADVIL,MOTRIN) 200 mg, Oral, Every 6 hours PRN    JARDIANCE 25 mg, Oral, Daily    lubiprostone (AMITIZA) 24 mcg, Oral, 2 times daily with meals    meloxicam (MOBIC) 7.5 MG tablet TAKE 1 TO 2 TABLETS BY MOUTH EVERY DAY WITH FOOD AS NEEDED FOR BACK PAIN    polyethylene glycol (GLYCOLAX) 17 g, Oral, Daily    rosuvastatin (CRESTOR) 20 mg, Oral, Nightly    SUTAB 1.479-0.188- 0.225 gram tablet Oral       Past Surgical History:   Procedure Laterality Date    CATARACT EXTRACTION, BILATERAL  2021    CHOLECYSTECTOMY  2005    COLONOSCOPY      exc growth eyelid      HERNIA REPAIR      TONSILLECTOMY  1950       Social History     Socioeconomic History    Marital " "status:    Tobacco Use    Smoking status: Former     Current packs/day: 0.00     Average packs/day: 1 pack/day for 15.0 years (15.0 ttl pk-yrs)     Types: Cigarettes     Start date:      Quit date:      Years since quittin.6     Passive exposure: Current    Smokeless tobacco: Never   Substance and Sexual Activity    Alcohol use: Never    Drug use: Never    Sexual activity: Yes     Partners: Female     Birth control/protection: None     Social Determinants of Health     Financial Resource Strain: Low Risk  (4/10/2023)    Overall Financial Resource Strain (CARDIA)     Difficulty of Paying Living Expenses: Not hard at all   Physical Activity: Inactive (4/10/2023)    Exercise Vital Sign     Days of Exercise per Week: 0 days     Minutes of Exercise per Session: 0 min   Housing Stability: Low Risk  (4/10/2023)    Housing Stability Vital Sign     Unable to Pay for Housing in the Last Year: No     Number of Places Lived in the Last Year: 1     Unstable Housing in the Last Year: No       Lab Results   Component Value Date    WBC 6.3 2022    HGB 16.0 2022    HCT 47.1 2022    MCV 92.7 2022     2022          BMP  Lab Results   Component Value Date    HCT 47.1 2022     2022    K 4.6 2022    BUN 23.0 2022    CREATININE 0.95 2022    CALCIUM 10.2 (H) 2022        INR  No results for input(s): "PT", "INR", "PROTIME", "APTT" in the last 72 hours.        Diagnostic Studies:      EKG:  No results found for this or any previous visit.         "

## 2023-09-13 NOTE — TRANSFER OF CARE
"Anesthesia Transfer of Care Note    Patient: Clifford Warren    Procedure(s) Performed: Procedure(s) (LRB):  Block-nerve-medial branch-lumbar (Left)    Patient location: OPS    Anesthesia Type: MAC    Transport from OR: Transported from OR on room air with adequate spontaneous ventilation    Post pain: adequate analgesia    Post assessment: no apparent anesthetic complications    Post vital signs: stable    Level of consciousness: awake    Nausea/Vomiting: no nausea/vomiting    Complications: none    Transfer of care protocol was followed      Last vitals:   Visit Vitals  BP (!) 141/87   Pulse 84   Temp 36.4 °C (97.5 °F) (Tympanic)   Resp 20   Ht 5' 8" (1.727 m)   Wt 71.4 kg (157 lb 6.5 oz)   SpO2 96%   BMI 23.93 kg/m²     "

## 2023-09-13 NOTE — DISCHARGE SUMMARY
[facility] - Periop Services  Discharge Note  Short Stay    Procedure(s) (LRB):  Block-nerve-medial branch-lumbar (Left)      OUTCOME: Patient tolerated treatment/procedure well without complication and is now ready for discharge.    DISPOSITION: Home or Self Care    FINAL DIAGNOSIS:  <principal problem not specified>    FOLLOWUP: In clinic    DISCHARGE INSTRUCTIONS:  No discharge procedures on file.     TIME SPENT ON DISCHARGE: 5 minutes

## 2023-09-13 NOTE — ANESTHESIA POSTPROCEDURE EVALUATION
Anesthesia Post Evaluation    Patient: Clifford Warren    Procedure(s) Performed: Procedure(s) (LRB):  Block-nerve-medial branch-lumbar (Left)    Final Anesthesia Type: general (/Regional//MAC)      Patient location during evaluation: PACU  Post-procedure mental status: @ basline.  Pain management: adequate    PONV status: See postop meds for drugs used to control n/v if any.  Anesthetic complications: no      Cardiovascular status: blood pressure returned to baseline  Respiratory status: @ baseline.  Hydration status: euvolemic            Vitals Value Taken Time   /75 09/13/23 1201   Temp 98 09/13/23 1220   Pulse 76 09/13/23 1208   Resp 18 09/13/23 1144   SpO2 95 % 09/13/23 1208   Vitals shown include unvalidated device data.      No case tracking events are documented in the log.      Pain/Milagros Score: No data recorded

## 2023-09-13 NOTE — OP NOTE
Left L3-5 diagnostic medial branch blocks    Pre-Procedure Diagnoses:  1. Chronic pain syndrome  2. Chronic Low back pain  3. Lumbar facet arthropathy    Post-Procedure Diagnoses:  1. Chronic pain syndrome  2. Chronic Low back pain  3. Lumbar facet arthropathy    Anesthesia:  Local and MAC    Estimated Blood Loss:  None    Complications:  None    Informed Consent:  The procedure, risks, benefits, and alternatives were discussed with the patient. There were no contraindications to the procedure. The patient expressed understanding and agreed to proceed. Fully informed written consent was obtained.     Description of the Procedure:  The patient was taken to the operating room. IV access was obtained prior to the start of the procedure. The patient was positioned prone on the fluoroscopy table. Continuous hemodynamic monitoring was initiated and continued throughout the duration of the procedure. The skin overlying the lumbosacral spine was prepped with Chloraprep and draped into a sterile field. Fluoroscopy was used to identify the locations of the left side L3, L4, and L5 medial branch nerves at the junctions of the superior articular processes and transverse processes of L4, L5, and the sacral ala, respectively. Skin anesthesia was achieved using 0.5 mL of 1% lidocaine over each injection site. A 22-gauge 3.5-inch Quinke spinal needle was slowly inserted and advanced under intermittent fluoroscopic guidance until it made bony contact at the target sites. Proper needle position was confirmed under AP, oblique, and lateral fluoroscopic views. Negative aspiration for blood or CSF was confirmed. Then 0.5 mL of 0.25% bupivacaine was easily injected at each level. There was no pain on injection. The needles were removed intact and bleeding was nil. Sterile bandages were applied. The patient was taken to the recovery room for further observation in stable condition. The patient was then discharged home without any  complications.

## 2023-09-14 VITALS
DIASTOLIC BLOOD PRESSURE: 75 MMHG | HEART RATE: 76 BPM | WEIGHT: 157.44 LBS | SYSTOLIC BLOOD PRESSURE: 125 MMHG | TEMPERATURE: 98 F | OXYGEN SATURATION: 97 % | BODY MASS INDEX: 23.86 KG/M2 | RESPIRATION RATE: 20 BRPM | HEIGHT: 68 IN

## 2023-09-27 ENCOUNTER — OFFICE VISIT (OUTPATIENT)
Dept: ORTHOPEDICS | Facility: CLINIC | Age: 78
End: 2023-09-27
Payer: MEDICARE

## 2023-09-27 VITALS — HEIGHT: 68 IN | BODY MASS INDEX: 23.79 KG/M2 | WEIGHT: 157 LBS

## 2023-09-27 DIAGNOSIS — M75.42 IMPINGEMENT SYNDROME OF LEFT SHOULDER: Primary | ICD-10-CM

## 2023-09-27 PROCEDURE — 20610 LARGE JOINT ASPIRATION/INJECTION: L SUBACROMIAL BURSA: ICD-10-PCS | Mod: LT,,, | Performed by: NURSE PRACTITIONER

## 2023-09-27 PROCEDURE — 20610 DRAIN/INJ JOINT/BURSA W/O US: CPT | Mod: LT,,, | Performed by: NURSE PRACTITIONER

## 2023-09-27 PROCEDURE — 99213 OFFICE O/P EST LOW 20 MIN: CPT | Mod: 25,,, | Performed by: NURSE PRACTITIONER

## 2023-09-27 PROCEDURE — 99213 PR OFFICE/OUTPT VISIT, EST, LEVL III, 20-29 MIN: ICD-10-PCS | Mod: 25,,, | Performed by: NURSE PRACTITIONER

## 2023-09-27 RX ORDER — LIDOCAINE HYDROCHLORIDE 10 MG/ML
3 INJECTION INFILTRATION; PERINEURAL
Status: DISCONTINUED | OUTPATIENT
Start: 2023-09-27 | End: 2023-09-27 | Stop reason: HOSPADM

## 2023-09-27 RX ORDER — BETAMETHASONE SODIUM PHOSPHATE AND BETAMETHASONE ACETATE 3; 3 MG/ML; MG/ML
6 INJECTION, SUSPENSION INTRA-ARTICULAR; INTRALESIONAL; INTRAMUSCULAR; SOFT TISSUE
Status: DISCONTINUED | OUTPATIENT
Start: 2023-09-27 | End: 2023-09-27 | Stop reason: HOSPADM

## 2023-09-27 RX ADMIN — LIDOCAINE HYDROCHLORIDE 3 ML: 10 INJECTION INFILTRATION; PERINEURAL at 02:09

## 2023-09-27 RX ADMIN — BETAMETHASONE SODIUM PHOSPHATE AND BETAMETHASONE ACETATE 6 MG: 3; 3 INJECTION, SUSPENSION INTRA-ARTICULAR; INTRALESIONAL; INTRAMUSCULAR; SOFT TISSUE at 02:09

## 2023-09-27 NOTE — PROGRESS NOTES
"Chief Complaint:   Chief Complaint   Patient presents with    Shoulder Pain     Left shoulder pain. Previous xr in chart states mild arthritis, no fracture or injury. States pain has been going on for a while. Had steroid injection in shoulder in November which gave relief. 4-5 falls in the past year.       Consulting Physician: No ref. provider found    History of present illness:    he  is a pleasant 78 y.o. year old male  with a longstanding history of left shoulder pain. He was seen last year and given a steroid injection which gave him relief. In the interim he has been in formal therapy. He has had multiple falls. Today his pain is superior and posterior in the shoulder. It is worse with activity. It is somewhat better with rest.     Past Medical History:   Diagnosis Date    ADHD (attention deficit hyperactivity disorder)     Cancer     skin cancer    Depression     Diabetes mellitus, type 2     [place] (hard of hearing)     Loss of equilibrium     Parkinson's disease     S/P epidural steroid injection     Sleep apnea, unspecified     "not using Cpap"    Smoker        Past Surgical History:   Procedure Laterality Date    CATARACT EXTRACTION, BILATERAL  2021    CHOLECYSTECTOMY  2005    COLONOSCOPY      exc growth eyelid      HERNIA REPAIR      INJECTION OF ANESTHETIC AGENT AROUND MEDIAL BRANCH NERVES INNERVATING LUMBAR FACET JOINT Left 9/13/2023    Procedure: Block-nerve-medial branch-lumbar;  Surgeon: Diana Turcios MD;  Location: [facility];  Service: Pain Management;  Laterality: Left;  Lt L3, L4 L5    TONSILLECTOMY  1950       Current Outpatient Medications   Medication Sig    acetaminophen (TYLENOL) 325 MG tablet Take 325 mg by mouth every 6 (six) hours as needed for Pain.    aspirin 81 MG Chew Take 81 mg by mouth once daily.    blood-glucose meter kit by Other route. Use as instructed    Accu-check guide  Accu-check fastclix lancets    carbidopa-levodopa  mg (SINEMET)  mg per tablet Take 1 tablet " by mouth 2 (two) times a day.    dulaglutide (TRULICITY) 1.5 mg/0.5 mL pen injector Inject into the skin every 7 days.    DULoxetine (CYMBALTA) 30 MG capsule Take 1 capsule (30 mg total) by mouth once daily.    finasteride (PROSCAR) 5 mg tablet Take 1 tablet (5 mg total) by mouth once daily.    fluticasone propionate (FLONASE) 50 mcg/actuation nasal spray 1 spray (50 mcg total) by Each Nostril route 2 (two) times a day. (Patient taking differently: 1 spray by Each Nostril route 2 (two) times daily as needed.)    ibuprofen (ADVIL,MOTRIN) 200 MG tablet Take 200 mg by mouth every 6 (six) hours as needed for Pain.    JARDIANCE 25 mg tablet Take 25 mg by mouth once daily.    lubiprostone (AMITIZA) 24 MCG Cap Take 1 capsule (24 mcg total) by mouth 2 (two) times daily with meals.    meloxicam (MOBIC) 7.5 MG tablet TAKE 1 TO 2 TABLETS BY MOUTH EVERY DAY WITH FOOD AS NEEDED FOR BACK PAIN (Patient taking differently: Take 7.5 mg by mouth once daily. TAKE 1 TO 2 TABLETS BY MOUTH EVERY DAY WITH FOOD AS NEEDED FOR BACK PAIN)    rosuvastatin (CRESTOR) 20 MG tablet Take 1 tablet (20 mg total) by mouth every evening.    bisacodyL (DULCOLAX) 5 mg EC tablet Take 5 mg by mouth daily as needed for Constipation.    polyethylene glycol (GLYCOLAX) 17 gram/dose powder Take 17 g by mouth once daily.    SUTAB 1.479-0.188- 0.225 gram tablet Take by mouth.     No current facility-administered medications for this visit.       Review of patient's allergies indicates:  No Known Allergies    Family History   Problem Relation Age of Onset    Diabetes Mother     Hearing loss Mother     Stroke Mother     Cancer Father         bladder ca    Dementia Father     Depression Father     Learning disabilities Sister        Social History     Socioeconomic History    Marital status:    Tobacco Use    Smoking status: Former     Current packs/day: 0.00     Average packs/day: 1 pack/day for 15.0 years (15.0 ttl pk-yrs)     Types: Cigarettes     Start  "date:      Quit date:      Years since quittin.7     Passive exposure: Current    Smokeless tobacco: Never   Substance and Sexual Activity    Alcohol use: Never    Drug use: Never    Sexual activity: Yes     Partners: Female     Birth control/protection: None     Social Determinants of Health     Financial Resource Strain: Low Risk  (4/10/2023)    Overall Financial Resource Strain (CARDIA)     Difficulty of Paying Living Expenses: Not hard at all   Physical Activity: Inactive (4/10/2023)    Exercise Vital Sign     Days of Exercise per Week: 0 days     Minutes of Exercise per Session: 0 min   Housing Stability: Low Risk  (4/10/2023)    Housing Stability Vital Sign     Unable to Pay for Housing in the Last Year: No     Number of Places Lived in the Last Year: 1     Unstable Housing in the Last Year: No       Review of Systems:    Constitution:   Denies chills, fever, and sweats.  HENT:   Denies headaches or blurry vision.  Cardiovascular:  Denies chest pain or irregular heart beat.  Respiratory:   Denies cough or shortness of breath.  Gastrointestinal:  Denies abdominal pain, nausea, or vomiting.  Musculoskeletal:   Denies muscle cramps.  Neurological:   Denies dizziness or focal weakness.  Psychiatric/Behavior: Normal mental status.  Hematology/Lymph:  Denies bleeding problem or easy bruising/bleeding.  Skin:    Denies rash or suspicious lesions.    Examination:    Vital Signs:    Vitals:    23 1418   Weight: 71.2 kg (157 lb)   Height: 5' 8" (1.727 m)       Body mass index is 23.87 kg/m².    Constitution:   Well-developed, well nourished patient in no acute distress.  Neurological:   Alert and oriented x 3 and cooperative to examination.     Psychiatric/Behavior: Normal mental status.  Respiratory:   No shortness of breath.  Cards:    Pulses palpable and symmetric, brisk cap refill   Eyes:    Extraoccular muscles intact  Skin:    No scars, rash or suspicious lesions.    MSK:   Shoulder Exam:         "           Right        Left  Skin:                                   Normal     Normal  AC joint tenderness:           None         None  Forward Flexion:                180            180  Abduction:                          180           180  External Rotation:               80              80  Internal Rotation:                80             80  Supraspinatus stress test: Neg           Neg  [place]'s Impingement:     Neg            +  Neer Impingement:            Neg           +  Apprehension:                   Neg           Neg  [place]'s:                           Neg            +  Speed's test:                     Neg            Neg  Strength:  External Rotation:           5/5                5/5  Lift Off/belly press:          5/5                5/5    N-V status:                   Intact             Intact    C-spine: Normal ROM, NT      Imaging: Previous xrays reported Mild degenerative arthrosis at the left shoulder with no acute pathology or significant interval change compared to radiographs 10/12/2022       Assessment: Impingement syndrome of left shoulder        Plan:  We discussed treatment options. He would like to try a repeat injection today. If his pain persists or worsens we can consider an MRI. He can follow up if he has any issues.

## 2023-09-27 NOTE — PROCEDURES
Large Joint Aspiration/Injection: L subacromial bursa    Date/Time: 9/27/2023 2:00 PM    Performed by: Kiersten Peña FNP  Authorized by: Kiersten Peña FNP    Consent Done?:  Yes (Verbal)  Indications:  Pain  Site marked: the procedure site was marked    Timeout: prior to procedure the correct patient, procedure, and site was verified    Prep: patient was prepped and draped in usual sterile fashion      Local anesthesia used?: Yes    Local anesthetic:  Topical anesthetic    Details:  Needle Size:  22 G  Approach:  Posterior  Location:  Shoulder  Site:  L subacromial bursa  Medications:  3 mL LIDOcaine HCL 10 mg/ml (1%) 10 mg/mL (1 %); 6 mg betamethasone acetate-betamethasone sodium phosphate 6 mg/mL  Patient tolerance:  Patient tolerated the procedure well with no immediate complications

## 2023-09-28 ENCOUNTER — OFFICE VISIT (OUTPATIENT)
Dept: PAIN MEDICINE | Facility: CLINIC | Age: 78
End: 2023-09-28
Payer: MEDICARE

## 2023-09-28 VITALS
SYSTOLIC BLOOD PRESSURE: 137 MMHG | WEIGHT: 157 LBS | DIASTOLIC BLOOD PRESSURE: 76 MMHG | HEART RATE: 93 BPM | HEIGHT: 68 IN | BODY MASS INDEX: 23.79 KG/M2

## 2023-09-28 DIAGNOSIS — M47.816 LUMBAR FACET ARTHROPATHY: Primary | ICD-10-CM

## 2023-09-28 DIAGNOSIS — G89.29 CHRONIC LEFT-SIDED LOW BACK PAIN WITH LEFT-SIDED SCIATICA: ICD-10-CM

## 2023-09-28 DIAGNOSIS — M54.42 CHRONIC LEFT-SIDED LOW BACK PAIN WITH LEFT-SIDED SCIATICA: ICD-10-CM

## 2023-09-28 DIAGNOSIS — M43.16 ANTEROLISTHESIS OF LUMBAR SPINE: ICD-10-CM

## 2023-09-28 PROCEDURE — 99214 PR OFFICE/OUTPT VISIT, EST, LEVL IV, 30-39 MIN: ICD-10-PCS | Mod: ,,, | Performed by: NURSE PRACTITIONER

## 2023-09-28 PROCEDURE — 99214 OFFICE O/P EST MOD 30 MIN: CPT | Mod: ,,, | Performed by: NURSE PRACTITIONER

## 2023-09-28 NOTE — PROGRESS NOTES
"Subjective:      Patient ID: Clifford Warren is a 78 y.o. male.    Chief Complaint: Post-op Evaluation (Post-op MBB Lt L3, L4, L5 09/13/23, pt states doing good, states injection was short lived, )    Referred by: No ref. provider found     HPI: A pleasant 78-year-old  male patient presenting as a follow-up for pain associated with lumbar facet arthropathy after completing a diagnostic left lumbar medial branch block to L3, L4, L5 on 09/13/2023.  He also has left-sided   sciatica with low back pain. He received > 80% pain relief  to axial lumbar spine for 24+ hours after completing diagnostic nerve blocks.     Pain has returned and  remains located to the lumbar axial spine.Pain is located to left side of his low back and worse with lateral twisting at the waist.  This will radiate posteriorly to his left thigh and stops at the left knee.  This pain feels like a stabbing and burning sensation.His most pressing pain is in the lumbar axial spine     He has no neurosurgical intervention only 1 prior lumbar epidural steroid injection in the past that did not help.  He completed 2 months of physical therapy that did help quite a lot however he is not performing the physical therapy exercises at home.  His current pain score today is 6/10.  This  back pain will elevate with standing, walking, twisting at waist and he is no longer able to play golf.  These activities will increase his pain to 09/10 out of 10. His pain does not wake him up.  The lowest his pain will reduced to is 3-5/10 and this is when he changes position sitting reclined or lying down.  He takes a low dose of Mobic every day and occasional Advil as well as Tylenol 500 mg daily.  He has no neurosurgical interventions to the low back.  MRI of his low back shows notable facet arthropathy to L4/L5 and L5/S1.        Vital signs:   Vitals:    09/28/23 1336 09/28/23 1337   BP: 137/76    Pulse: 93    Weight: 71.2 kg (157 lb)    Height: 5' 8" " (1.727 m)    PainSc:    9     Body mass index is 23.87 kg/m².  Pain Disability Index (PDI): 51       Interventional Pain History  09/13/2023.diagnostic left lumbar medial branch block to L3, L4, L5     ROS: Low back pain     Lumbar MRI 2023:   FINDINGS:  6 mm of anterolisthesis is present at L5-S1.  Lumbar spine alignment is otherwise anatomic.  The lumbar lordosis is mildly exaggerated.  Vertebral body heights are maintained.  The discs are desiccated.  The disc space narrowing is moderate at L1-L2 and L5-S1.  A 12 mm vertebral body hemangioma is present at L2.  No worrisome marrow signal abnormality is seen. The distal spinal cord and conus are within normal limits, with conus terminating at L1.     L1-L2: Disc space narrowing is moderate.  Circumferential annular disc bulge is mild.  No spinal canal stenosis or foraminal stenosis is observed.     L2-L3: A small dorsal central zone annular fissure is present.  No significant disc bulge or herniation is observed.  Facet arthropathy is moderate, with small facet joint effusions.     L3-L4: No disc bulge or herniation is observed.  Ligamentum flavum thickening and facet arthropathy are moderate, with small facet joint effusions.  There is no spinal canal stenosis or foraminal stenosis.     L4-L5: No disc bulge, herniation, spinal canal, or foraminal stenosis is observed.  Facet arthropathy is moderate, with a small right facet joint effusion.     L5-S1: Grade 1 anterolisthesis is again noted, with disc uncovering.  Facet arthropathy is advanced, with small facet joint effusions.  No significant spinal canal stenosis or left foraminal stenosis is observed.  Right foraminal stenosis is mild.     Impression:     L1-L2 moderate disc disease.     L2-L3 small dorsal central zone annular fissure, moderate facet arthropathy, and small facet joint effusions.     L3-L4 moderate facet arthropathy, with small facet joint effusions.     L4-L5 moderate facet arthropathy, with  small right facet joint effusion.     L5-S1 grade 1 anterolisthesis, advanced facet arthropathy, small facet joint effusions, and mild right foraminal stenosis.                  Objective:          Physical Exam  General: Well developed; normal weight; A&O x 3; No anxiety/depression; NAD  Mental Status: Oriented to person, place and time. Displays appropriate mood & affect.  Head: Norm cephalic and atraumatic  Neck:  No cervical paraspinal banding.  Full range of motion with lateral turning and cervical flexion +extension.  Eyes: normal conjunctiva, normal lids, normal pupils  ENT and mouth: normal external ear, nose, and no lesions noted on the lips.  Respiratory: Symmetrical, Unlabored. No dyspnea  CV: normal rhythm and rate. No peripheral edema.   Abdomen: Non-distended     Extremities:  Gen: No cyanosis or tenderness to palpation bilateral upper and lower extremities  Skin: Warm, pink, dry, no rashes, no lesions on the lumbar spine  Strength: 5/5 motor strength bilateral upper and lower extremities  ROM: Full ROM in bilateral knees and ankles without pain or instability.     Neuro:  Gait: no altalgic lean, normal toe and heel raise. Independent ambulator.  DTR's: 2+ in bilateral patellar, and ankle  Sensory: Intact to light touch bilateral  upper and lower extremities     Spine: Normal lordosis. No scoliosis  L-spine ROM: Full ROM to flexion, pain and limited range of motion with extension and bilateral rotation,   Straight Leg Raise:  Negative bilaterally  SI Joint: No tenderness to palpation bilaterally.      Assessment:     A/P    Request sent to repeat second diagnostic left lumbar medial branch block to L3, L4, L5     Patient had great left sided low back pain  control  (about 80% relief) to axial spine after completing his first diagnostic block on left to same levels and was able to mop and perform household chores without pain. Pain has returned and is worse with chores, standing and prolonged walking.  "      Encounter Diagnoses   Name Primary?    Lumbar facet arthropathy Yes    Chronic left-sided low back pain with left-sided sciatica     Anterolisthesis of lumbar spine          Plan:       Clifford was seen today for post-op evaluation.    Diagnoses and all orders for this visit:    Lumbar facet arthropathy    Chronic left-sided low back pain with left-sided sciatica    Anterolisthesis of lumbar spine               Past Medical History:   Diagnosis Date    ADHD (attention deficit hyperactivity disorder)     Cancer     skin cancer    Depression     Diabetes mellitus, type 2     [place] (hard of hearing)     Loss of equilibrium     Parkinson's disease     S/P epidural steroid injection     Sleep apnea, unspecified     "not using Cpap"    Smoker        Past Surgical History:   Procedure Laterality Date    CATARACT EXTRACTION, BILATERAL      CHOLECYSTECTOMY      COLONOSCOPY      exc growth eyelid      HERNIA REPAIR      INJECTION OF ANESTHETIC AGENT AROUND MEDIAL BRANCH NERVES INNERVATING LUMBAR FACET JOINT Left 2023    Procedure: Block-nerve-medial branch-lumbar;  Surgeon: Diana Turcios MD;  Location: [facility] OR;  Service: Pain Management;  Laterality: Left;  Lt L3, L4 L5    TONSILLECTOMY  1950       Family History   Problem Relation Age of Onset    Diabetes Mother     Hearing loss Mother     Stroke Mother     Cancer Father         bladder ca    Dementia Father     Depression Father     Learning disabilities Sister        Social History     Socioeconomic History    Marital status:    Tobacco Use    Smoking status: Former     Current packs/day: 0.00     Average packs/day: 1 pack/day for 15.0 years (15.0 ttl pk-yrs)     Types: Cigarettes     Start date:      Quit date:      Years since quittin.7     Passive exposure: Current    Smokeless tobacco: Never   Substance and Sexual Activity    Alcohol use: Never    Drug use: Never    Sexual activity: Yes     Partners: Female     Birth " control/protection: None     Social Determinants of Health     Financial Resource Strain: Low Risk  (4/10/2023)    Overall Financial Resource Strain (CARDIA)     Difficulty of Paying Living Expenses: Not hard at all   Physical Activity: Inactive (4/10/2023)    Exercise Vital Sign     Days of Exercise per Week: 0 days     Minutes of Exercise per Session: 0 min   Housing Stability: Low Risk  (4/10/2023)    Housing Stability Vital Sign     Unable to Pay for Housing in the Last Year: No     Number of Places Lived in the Last Year: 1     Unstable Housing in the Last Year: No       Current Outpatient Medications   Medication Sig Dispense Refill    aspirin 81 MG Chew Take 81 mg by mouth once daily.      carbidopa-levodopa  mg (SINEMET)  mg per tablet Take 1 tablet by mouth 2 (two) times a day. 180 tablet 2    DULoxetine (CYMBALTA) 30 MG capsule Take 1 capsule (30 mg total) by mouth once daily. 90 capsule 1    finasteride (PROSCAR) 5 mg tablet Take 1 tablet (5 mg total) by mouth once daily. 30 tablet 11    fluticasone propionate (FLONASE) 50 mcg/actuation nasal spray 1 spray (50 mcg total) by Each Nostril route 2 (two) times a day. (Patient taking differently: 1 spray by Each Nostril route 2 (two) times daily as needed.) 15.8 mL 11    ibuprofen (ADVIL,MOTRIN) 200 MG tablet Take 200 mg by mouth every 6 (six) hours as needed for Pain.      JARDIANCE 25 mg tablet Take 25 mg by mouth once daily.      lubiprostone (AMITIZA) 24 MCG Cap Take 1 capsule (24 mcg total) by mouth 2 (two) times daily with meals. 60 capsule 11    meloxicam (MOBIC) 7.5 MG tablet TAKE 1 TO 2 TABLETS BY MOUTH EVERY DAY WITH FOOD AS NEEDED FOR BACK PAIN (Patient taking differently: Take 7.5 mg by mouth once daily. TAKE 1 TO 2 TABLETS BY MOUTH EVERY DAY WITH FOOD AS NEEDED FOR BACK PAIN) 60 tablet 1    rosuvastatin (CRESTOR) 20 MG tablet Take 1 tablet (20 mg total) by mouth every evening. 90 tablet 3    acetaminophen (TYLENOL) 325 MG tablet Take  325 mg by mouth every 6 (six) hours as needed for Pain.      bisacodyL (DULCOLAX) 5 mg EC tablet Take 5 mg by mouth daily as needed for Constipation.      blood-glucose meter kit by Other route. Use as instructed    Accu-check guide  Accu-check fastclix lancets      dulaglutide (TRULICITY) 1.5 mg/0.5 mL pen injector Inject into the skin every 7 days.      polyethylene glycol (GLYCOLAX) 17 gram/dose powder Take 17 g by mouth once daily.      SUTAB 1.479-0.188- 0.225 gram tablet Take by mouth.       No current facility-administered medications for this visit.       Review of patient's allergies indicates:  No Known Allergies

## 2023-10-20 ENCOUNTER — OFFICE VISIT (OUTPATIENT)
Dept: PAIN MEDICINE | Facility: CLINIC | Age: 78
End: 2023-10-20
Payer: MEDICARE

## 2023-10-20 VITALS
HEIGHT: 68 IN | WEIGHT: 149 LBS | TEMPERATURE: 98 F | HEART RATE: 82 BPM | DIASTOLIC BLOOD PRESSURE: 68 MMHG | BODY MASS INDEX: 22.58 KG/M2 | SYSTOLIC BLOOD PRESSURE: 143 MMHG

## 2023-10-20 DIAGNOSIS — M47.816 LUMBAR FACET ARTHROPATHY: Primary | ICD-10-CM

## 2023-10-20 DIAGNOSIS — M54.16 LUMBAR RADICULITIS: ICD-10-CM

## 2023-10-20 PROCEDURE — 99213 PR OFFICE/OUTPT VISIT, EST, LEVL III, 20-29 MIN: ICD-10-PCS | Mod: ,,, | Performed by: NURSE PRACTITIONER

## 2023-10-20 PROCEDURE — 99213 OFFICE O/P EST LOW 20 MIN: CPT | Mod: ,,, | Performed by: NURSE PRACTITIONER

## 2023-10-20 NOTE — PROGRESS NOTES
"  ADMISSION HISTORY & PHYSICAL    SUBJECTIVE:    CHIEF COMPLAINT: Low-back Pain (Pre op MBB Lt L3,L4 L5 11/1/23 C/O pain level 8, Taking tylenol or Advil PRN.)       History of Present Illness: 78 y.o. male presents today for preoperative evaluation for left medial branch nerve block lumbar L3-L5 on 11/01/2023. I reviewed the indications for procedure. The risks and benefits of the proposed and alternative treatments were discussed with the patient. Questions pertinent to the procedure were solicited and answered. No assurances were given. Informed consent was obtained. The patient expressed good understanding and wished to proceed with scheduling the procedure.     Review of Systems:   Constitutional: No fever, weakness, or fatigue.   Ear/Nose/Mouth/Throat: No nasal congestion or sore throat.   Respiratory: No shortness of breath or cough.   Cardiovascular: No chest pain, palpitations, or peripheral edema.   Gastrointestinal: No nausea, vomiting, or abdominal pain.   Genitourinary: No dysuria.  Musculoskeletal: pain to bilateral low back    Past Surgical History:   Procedure Laterality Date    CATARACT EXTRACTION, BILATERAL  2021    CHOLECYSTECTOMY  2005    COLONOSCOPY      exc growth eyelid      HERNIA REPAIR      INJECTION OF ANESTHETIC AGENT AROUND MEDIAL BRANCH NERVES INNERVATING LUMBAR FACET JOINT Left 9/13/2023    Procedure: Block-nerve-medial branch-lumbar;  Surgeon: Diana Turcios MD;  Location: [facility] OR;  Service: Pain Management;  Laterality: Left;  Lt L3, L4 L5    TONSILLECTOMY  1950        Past Medical History:   Diagnosis Date    ADHD (attention deficit hyperactivity disorder)     Cancer     skin cancer    Depression     Diabetes mellitus, type 2     [place] (hard of hearing)     Loss of equilibrium     Parkinson's disease     S/P epidural steroid injection     Sleep apnea, unspecified     "not using Cpap"    Smoker         OBJECTIVE:    Vitals:    10/20/23 1037   BP: (!) 143/68   Pulse: 82   Temp: 97.7 " °F (36.5 °C)      Pain Disability Index  Family/Home Responsibilities:: 7  Recreation:: 10  Social Activity:: 8  Occupation:: 7  Sexual Behavior:: 10  Self Care:: 8  Life-Support Activities:: 8  Pain Disability Index (PDI): 58      Physical Exam:   General: Well-developed, well-nourished.  Neuro: Alert and oriented x 3.  Psych: Normal mood and affect.  HEENT: Normocephalic. PERRLA EOM intact. Nose and throat clear.  Lungs: Clear to auscultation and percussion.  Heart: Regular rate and rhythm   Abdomen: Soft non-tender. Bowel sounds positive. No rebound tenderness.  Skin: No rashes or open wounds  Musculoskeletal: Pain to bilateral low back with lateral rotation and lumbar extension    ASSESSMENT:  1. Lumbar facet arthropathy    2. Lumbar radiculitis       PLAN:  Plan for to proceed with  left medial branch nerve block lumbar L3-L5 on 11/01/2023.. The patient has been given preoperative instructions and prescriptions for post-operative medication. Post-operative appointment is scheduled for 2 weeks.  Instructed patient to hold Mobic, ibuprofen and aspirin 7 days prior to procedure.  Patient verified understanding.

## 2023-10-20 NOTE — H&P (VIEW-ONLY)
"  ADMISSION HISTORY & PHYSICAL    SUBJECTIVE:    CHIEF COMPLAINT: Low-back Pain (Pre op MBB Lt L3,L4 L5 11/1/23 C/O pain level 8, Taking tylenol or Advil PRN.)       History of Present Illness: 78 y.o. male presents today for preoperative evaluation for left medial branch nerve block lumbar L3-L5 on 11/01/2023. I reviewed the indications for procedure. The risks and benefits of the proposed and alternative treatments were discussed with the patient. Questions pertinent to the procedure were solicited and answered. No assurances were given. Informed consent was obtained. The patient expressed good understanding and wished to proceed with scheduling the procedure.     Review of Systems:   Constitutional: No fever, weakness, or fatigue.   Ear/Nose/Mouth/Throat: No nasal congestion or sore throat.   Respiratory: No shortness of breath or cough.   Cardiovascular: No chest pain, palpitations, or peripheral edema.   Gastrointestinal: No nausea, vomiting, or abdominal pain.   Genitourinary: No dysuria.  Musculoskeletal: pain to bilateral low back    Past Surgical History:   Procedure Laterality Date    CATARACT EXTRACTION, BILATERAL  2021    CHOLECYSTECTOMY  2005    COLONOSCOPY      exc growth eyelid      HERNIA REPAIR      INJECTION OF ANESTHETIC AGENT AROUND MEDIAL BRANCH NERVES INNERVATING LUMBAR FACET JOINT Left 9/13/2023    Procedure: Block-nerve-medial branch-lumbar;  Surgeon: Diana Turcios MD;  Location: [facility] OR;  Service: Pain Management;  Laterality: Left;  Lt L3, L4 L5    TONSILLECTOMY  1950        Past Medical History:   Diagnosis Date    ADHD (attention deficit hyperactivity disorder)     Cancer     skin cancer    Depression     Diabetes mellitus, type 2     [place] (hard of hearing)     Loss of equilibrium     Parkinson's disease     S/P epidural steroid injection     Sleep apnea, unspecified     "not using Cpap"    Smoker         OBJECTIVE:    Vitals:    10/20/23 1037   BP: (!) 143/68   Pulse: 82   Temp: 97.7 " °F (36.5 °C)      Pain Disability Index  Family/Home Responsibilities:: 7  Recreation:: 10  Social Activity:: 8  Occupation:: 7  Sexual Behavior:: 10  Self Care:: 8  Life-Support Activities:: 8  Pain Disability Index (PDI): 58      Physical Exam:   General: Well-developed, well-nourished.  Neuro: Alert and oriented x 3.  Psych: Normal mood and affect.  HEENT: Normocephalic. PERRLA EOM intact. Nose and throat clear.  Lungs: Clear to auscultation and percussion.  Heart: Regular rate and rhythm   Abdomen: Soft non-tender. Bowel sounds positive. No rebound tenderness.  Skin: No rashes or open wounds  Musculoskeletal: Pain to bilateral low back with lateral rotation and lumbar extension    ASSESSMENT:  1. Lumbar facet arthropathy    2. Lumbar radiculitis       PLAN:  Plan for to proceed with  left medial branch nerve block lumbar L3-L5 on 11/01/2023.. The patient has been given preoperative instructions and prescriptions for post-operative medication. Post-operative appointment is scheduled for 2 weeks.  Instructed patient to hold Mobic, ibuprofen and aspirin 7 days prior to procedure.  Patient verified understanding.

## 2023-10-21 DIAGNOSIS — G89.29 CHRONIC LEFT-SIDED LOW BACK PAIN WITH LEFT-SIDED SCIATICA: ICD-10-CM

## 2023-10-21 DIAGNOSIS — M54.42 CHRONIC LEFT-SIDED LOW BACK PAIN WITH LEFT-SIDED SCIATICA: ICD-10-CM

## 2023-10-22 RX ORDER — MELOXICAM 7.5 MG/1
TABLET ORAL
Qty: 60 TABLET | Refills: 1 | Status: SHIPPED | OUTPATIENT
Start: 2023-10-22

## 2023-10-24 RX ORDER — IBUPROFEN 200 MG
200 TABLET ORAL EVERY 6 HOURS PRN
COMMUNITY
End: 2024-01-30

## 2023-10-24 RX ORDER — BROMPHENIRAMINE MALEATE, DEXTROMETHORPHAN HBR, PHENYLEPHRINE HCL, DIPHENHYDRAMINE HCL, PHENYLEPHRINE HCL 0.52G
2 KIT ORAL DAILY
COMMUNITY
End: 2024-01-30

## 2023-10-30 ENCOUNTER — ANESTHESIA EVENT (OUTPATIENT)
Dept: SURGERY | Facility: HOSPITAL | Age: 78
End: 2023-10-30
Payer: MEDICARE

## 2023-10-31 RX ORDER — ONDANSETRON 2 MG/ML
4 INJECTION INTRAMUSCULAR; INTRAVENOUS ONCE AS NEEDED
Status: CANCELLED | OUTPATIENT
Start: 2023-10-31 | End: 2035-03-29

## 2023-11-01 ENCOUNTER — TELEPHONE (OUTPATIENT)
Dept: PAIN MEDICINE | Facility: CLINIC | Age: 78
End: 2023-11-01
Payer: MEDICARE

## 2023-11-01 ENCOUNTER — ANESTHESIA (OUTPATIENT)
Dept: SURGERY | Facility: HOSPITAL | Age: 78
End: 2023-11-01
Payer: MEDICARE

## 2023-11-01 ENCOUNTER — HOSPITAL ENCOUNTER (OUTPATIENT)
Facility: HOSPITAL | Age: 78
Discharge: HOME | End: 2023-11-01
Attending: ANESTHESIOLOGY | Admitting: ANESTHESIOLOGY
Payer: MEDICARE

## 2023-11-01 VITALS
DIASTOLIC BLOOD PRESSURE: 74 MMHG | WEIGHT: 150 LBS | TEMPERATURE: 96 F | RESPIRATION RATE: 18 BRPM | BODY MASS INDEX: 22.73 KG/M2 | HEART RATE: 67 BPM | HEIGHT: 68 IN | OXYGEN SATURATION: 99 % | SYSTOLIC BLOOD PRESSURE: 126 MMHG

## 2023-11-01 DIAGNOSIS — G89.29 CHRONIC BACK PAIN GREATER THAN 3 MONTHS DURATION: ICD-10-CM

## 2023-11-01 DIAGNOSIS — M54.9 CHRONIC BACK PAIN GREATER THAN 3 MONTHS DURATION: ICD-10-CM

## 2023-11-01 LAB — POCT GLUCOSE: 122 MG/DL (ref 70–110)

## 2023-11-01 PROCEDURE — 64493 INJ PARAVERT F JNT L/S 1 LEV: CPT | Mod: LT | Performed by: ANESTHESIOLOGY

## 2023-11-01 PROCEDURE — 63600175 PHARM REV CODE 636 W HCPCS: Performed by: ANESTHESIOLOGY

## 2023-11-01 PROCEDURE — 37000008 HC ANESTHESIA 1ST 15 MINUTES: Performed by: ANESTHESIOLOGY

## 2023-11-01 PROCEDURE — D9220A PRA ANESTHESIA: Mod: ANES,,, | Performed by: ANESTHESIOLOGY

## 2023-11-01 PROCEDURE — 64494 INJ PARAVERT F JNT L/S 2 LEV: CPT | Mod: LT | Performed by: ANESTHESIOLOGY

## 2023-11-01 PROCEDURE — 64494 PR INJ DX/THER AGNT PARAVERT FACET JOINT,IMG GUIDE,LUMBAR/SAC, 2ND LEVEL: ICD-10-PCS | Mod: LT,,, | Performed by: ANESTHESIOLOGY

## 2023-11-01 PROCEDURE — 25000003 PHARM REV CODE 250

## 2023-11-01 PROCEDURE — 25000003 PHARM REV CODE 250: Performed by: ANESTHESIOLOGY

## 2023-11-01 PROCEDURE — 64493 PR INJ DX/THER AGNT PARAVERT FACET JOINT,IMG GUIDE,LUMBAR/SAC,1ST LVL: ICD-10-PCS | Mod: LT,,, | Performed by: ANESTHESIOLOGY

## 2023-11-01 PROCEDURE — 64494 INJ PARAVERT F JNT L/S 2 LEV: CPT | Mod: LT,,, | Performed by: ANESTHESIOLOGY

## 2023-11-01 PROCEDURE — D9220A PRA ANESTHESIA: Mod: CRNA,,,

## 2023-11-01 PROCEDURE — D9220A PRA ANESTHESIA: ICD-10-PCS | Mod: CRNA,,,

## 2023-11-01 PROCEDURE — 82962 GLUCOSE BLOOD TEST: CPT | Performed by: ANESTHESIOLOGY

## 2023-11-01 PROCEDURE — 64493 INJ PARAVERT F JNT L/S 1 LEV: CPT | Mod: LT,,, | Performed by: ANESTHESIOLOGY

## 2023-11-01 PROCEDURE — D9220A PRA ANESTHESIA: ICD-10-PCS | Mod: ANES,,, | Performed by: ANESTHESIOLOGY

## 2023-11-01 RX ORDER — LIDOCAINE HYDROCHLORIDE 10 MG/ML
INJECTION, SOLUTION EPIDURAL; INFILTRATION; INTRACAUDAL; PERINEURAL
Status: DISCONTINUED
Start: 2023-11-01 | End: 2023-11-01 | Stop reason: HOSPADM

## 2023-11-01 RX ORDER — PROPOFOL 10 MG/ML
VIAL (ML) INTRAVENOUS
Status: DISCONTINUED | OUTPATIENT
Start: 2023-11-01 | End: 2023-11-01

## 2023-11-01 RX ORDER — BUPIVACAINE HYDROCHLORIDE 2.5 MG/ML
INJECTION, SOLUTION EPIDURAL; INFILTRATION; INTRACAUDAL
Status: DISCONTINUED
Start: 2023-11-01 | End: 2023-11-01 | Stop reason: HOSPADM

## 2023-11-01 RX ORDER — LIDOCAINE HYDROCHLORIDE 20 MG/ML
INJECTION, SOLUTION EPIDURAL; INFILTRATION; INTRACAUDAL; PERINEURAL
Status: DISCONTINUED | OUTPATIENT
Start: 2023-11-01 | End: 2023-11-01

## 2023-11-01 RX ORDER — TRIAMCINOLONE ACETONIDE 40 MG/ML
INJECTION, SUSPENSION INTRA-ARTICULAR; INTRAMUSCULAR
Status: DISCONTINUED
Start: 2023-11-01 | End: 2023-11-01 | Stop reason: WASHOUT

## 2023-11-01 RX ORDER — LIDOCAINE HYDROCHLORIDE 10 MG/ML
INJECTION, SOLUTION EPIDURAL; INFILTRATION; INTRACAUDAL; PERINEURAL
Status: DISCONTINUED | OUTPATIENT
Start: 2023-11-01 | End: 2023-11-01 | Stop reason: HOSPADM

## 2023-11-01 RX ORDER — BUPIVACAINE HYDROCHLORIDE 2.5 MG/ML
INJECTION, SOLUTION EPIDURAL; INFILTRATION; INTRACAUDAL
Status: DISCONTINUED | OUTPATIENT
Start: 2023-11-01 | End: 2023-11-01 | Stop reason: HOSPADM

## 2023-11-01 RX ADMIN — Medication 100 MG: at 10:11

## 2023-11-01 RX ADMIN — Medication 50 MG: at 10:11

## 2023-11-01 RX ADMIN — LIDOCAINE HYDROCHLORIDE 4 ML: 20 INJECTION, SOLUTION EPIDURAL; INFILTRATION; INTRACAUDAL; PERINEURAL at 10:11

## 2023-11-01 NOTE — ANESTHESIA PREPROCEDURE EVALUATION
10/31/2023  Clifford Warren is a 78 y.o., male , who presents for the following:    Procedure: Block-nerve-medial branch-lumbar (Left) - Left MBB L3, L4, L5   Anesthesia type: Local MAC   Diagnosis:        Lumbar facet arthropathy [M47.816]       Chronic left-sided low back pain with left-sided sciatica [M54.42, G89.29]       Anterolisthesis of lumbar spine [M43.16]   Pre-op diagnosis:        Lumbar facet arthropathy [M47.816]       Chronic left-sided low back pain with left-sided sciatica [M54.42, G89.29]       Anterolisthesis of lumbar spine [M43.16]   Location: [facility] OR  / [facility] OR   Surgeons: Diana Turcios MD         Pre-op Assessment    I have reviewed the Patient Summary Reports.     I have reviewed the Nursing Notes. I have reviewed the NPO Status.   I have reviewed the Medications.     Review of Systems  Anesthesia Hx:  No problems with previous Anesthesia  Denies Family Hx of Anesthesia complications.   Denies Personal Hx of Anesthesia complications.   Social:  Former Smoker    EENT/Dental:   [place]   Cardiovascular:   hyperlipidemia    Pulmonary:   Sleep Apnea    Musculoskeletal:   Arthritis     Neurological:   Parkinsonism  Neurological Gait Dysfunction   Endocrine:   Diabetes, type 2    Psych:   depression ADHD         Physical Exam  General: Alert and Oriented    Airway:  Mallampati: II   Mouth Opening: Normal  TM Distance: Normal  Tongue: Normal  Neck ROM: Normal ROM    Chest/Lungs:  Normal Respiratory Rate    Heart:  Rate: Normal  Rhythm: Regular Rhythm        Anesthesia Plan  Type of Anesthesia, risks & benefits discussed:    Anesthesia Type: MAC  Intra-op Monitoring Plan: Standard ASA Monitors  Induction:  IV  Airway Plan: Direct  Informed Consent: Informed consent signed with the Patient and all parties understand the risks and agree with anesthesia plan.  All questions answered.  Patient consented to blood products? No  ASA Score: 3  Day of Surgery Review of History & Physical: H&P Update referred to the surgeon/provider.  Anesthesia Plan Notes: Nasal Cannula vs O2 Via Facemask    Ready For Surgery From Anesthesia Perspective.     .

## 2023-11-01 NOTE — PLAN OF CARE
Pt ready for discharge. Tolerated procedure well.  Problem: Adult Inpatient Plan of Care  Goal: Plan of Care Review  Outcome: Met  Goal: Patient-Specific Goal (Individualized)  Outcome: Met  Goal: Absence of Hospital-Acquired Illness or Injury  Outcome: Met  Goal: Optimal Comfort and Wellbeing  Outcome: Met  Goal: Readiness for Transition of Care  Outcome: Met     Problem: Diabetes Comorbidity  Goal: Blood Glucose Level Within Targeted Range  Outcome: Met

## 2023-11-01 NOTE — DISCHARGE SUMMARY
[facility] - Periop Services  Discharge Note  Short Stay    Procedure(s) (LRB):  Block-nerve-medial branch-lumbar (Left)      OUTCOME: Patient tolerated treatment/procedure well without complication and is now ready for discharge.    DISPOSITION: Home or Self Care    FINAL DIAGNOSIS:  <principal problem not specified>    FOLLOWUP: In clinic    DISCHARGE INSTRUCTIONS:  No discharge procedures on file.     TIME SPENT ON DISCHARGE: 5 minutes

## 2023-11-01 NOTE — ANESTHESIA POSTPROCEDURE EVALUATION
Anesthesia Post Evaluation    Patient: Clifford Warren    Procedure(s) Performed: Procedure(s) (LRB):  Block-nerve-medial branch-lumbar (Left)    Final Anesthesia Type: MAC      Patient location during evaluation: OPS  Patient participation: Yes- Able to Participate  Level of consciousness: awake and oriented  Post-procedure vital signs: reviewed and stable  Pain management: adequate  Airway patency: patent    PONV status at discharge: No PONV  Anesthetic complications: no      Cardiovascular status: stable and hemodynamically stable  Respiratory status: unassisted and spontaneous ventilation  Hydration status: euvolemic  Follow-up not needed.          Vitals Value Taken Time   /74 11/01/23 1101   Temp ** 11/01/23 1851   Pulse 72 11/01/23 1114   Resp 18 11/01/23 1100   SpO2 99 % 11/01/23 1114   Vitals shown include unvalidated device data.      No case tracking events are documented in the log.      Pain/Milagros Score: Modified Milagros Score: 20 (11/1/2023 11:28 AM)

## 2023-11-01 NOTE — DISCHARGE INSTRUCTIONS
MEDIAL BRANCH BLOCK/ EPIDURAL STEROID INJECTION, CARE AFTER    These instructions give you information on caring for yourself after your procedure. Your doctor may also give you specific instructions. Call your doctor if you have any problems or questions after your procedure.     HOME CARE  Do not drive or use any machinery for the next 24 hours.  Do not do any hard physical activity for the next 24 hours  No heavy lifting for one week  Apply ice pack for comfort  Do not use a heating pad in area of injection  You may go back to eating as usual  Remove bandaids tomorrow and then you may shower  No tub soaking for 3-5 days    SIDE EFFECTS THAT COULD HAPPEN UP TO 4 HOURS AFTER THE INJECTION  If you have leg weakness or numbness, have someone help you walk. If the weakness or numbness does not go away, or if it gets worse go to the emergency department.  If you have dizziness, lie down right away. This usually helps. Sit up slowly and then when you have been sitting for a few minutes then stand up.  If you have a mild headache, drink fluids (especially drinks with caffeine) Call your doctor if the headache gets worse or persists.  When the numbing medicine wears off you may feel some discomfort where you had the shot. It usually only lasts for a few days. You may put ice over the injection site. Leave ice on for 20 minutes at a time and protect your skin during use.   You may feel minor back pain and stiffness at the site of the shot. Call your doctor if this pain gets worse or does not improve. You may feel nauseous or vomit several hours after your procedure. If this happens, try drinking small amounts of clear liquids until you feel better. If you continue to feel nauseated or continue vomiting, get help right away.    Steroids may take several days to start working. The shot usually helps leg pain more than back pain. The shot will not fix what is causing the pain but may take away some of the pain. This pain  relief may last from 2 weeks to 6 months.     GET HELP RIGHT AWAY IF:  You have very bad pain, headache or neck pain or stiffness  There is a change in your vision (double or blurry vision)  You have a fever over 101 or chills  You have swelling or redness at the injection site  You get weaker  You are not able to control your bladder or bowels  You are not able to urinate JOINT INJECTION, CARE AFTER    Refer to this sheet in the next few days. These instructions provide you with information on caring for yourself after you have had a joint injection. Your caregiver also may give you more specific instructions. Call your caregiver if you have any problems or questions after your procedure.     After any type of joint injection it is common to experience:  Soreness, swelling, or bruising around the injection site.  Mild numbness, tingling or weakness around the injection site caused by the numbing medicine used before or with the injection.       It is possible to experience the following effects associated with the specific agent after injection:      Iodine-based contrast agents:   Allergic reaction (itching, hives, widespread redness and swelling beyond the injection site)    Corticosteroids:  Allergic reaction  Increased blood sugar levels  Increased blood pressure  Mood swings  Temporary flushing or redness  Inability to sleep    HOME CARE INSTRUCTIONS  Limit yourself to light activity the day of your procedure. Avoid lifting heavy objects, bending, stooping or twisting  Take prescription or over-the-counter pain medication as directed  You my apply ice to your injection site to reduce pain and swelling the day of your procedure. Ice may be applied for 20 minutes 3-4x/day. Put ice in plastic bag. Place a towel between your skin and the ice.     NO DRIVING FOR 24 HOURS  KEEP BAND-AID CLEAN AND DRY FOR 24 HOURS. OK TO SHOWER TOMORROW. NO TUB BATHS FOR SEVERAL DAYS. CALL DOCTORS OFFICE FOR ANY REDNESS, FEVER OR PUS  DRAINAGE FROM INJECTION SITE

## 2023-11-01 NOTE — TRANSFER OF CARE
"Anesthesia Transfer of Care Note    Patient: Clifford Warren    Procedure(s) Performed: Procedure(s) (LRB):  Block-nerve-medial branch-lumbar (Left)    Patient location: OPS    Anesthesia Type: general and MAC    Transport from OR: Transported from OR on 2-3 L/min O2 by NC with adequate spontaneous ventilation    Post pain: adequate analgesia    Post assessment: no apparent anesthetic complications    Post vital signs: stable    Level of consciousness: responds to stimulation    Nausea/Vomiting: no nausea/vomiting    Complications: none    Transfer of care protocol was followedComments: Detailed report with handoff to licensed provider complete      Last vitals:   Visit Vitals  BP (!) 141/88   Pulse 65   Temp 35.8 °C (96.4 °F) (Tympanic)   Resp 20   Ht 5' 8" (1.727 m)   Wt 68 kg (150 lb)   SpO2 99%   BMI 22.81 kg/m²     "

## 2023-11-01 NOTE — TELEPHONE ENCOUNTER
Pt's wife contacted office statin g since the procedure this morning  he has been sneezing and his nose is leaking it is continuous the only medication he has had so far is Flonase, Ria would like to know what to do, correct pharmacy on file, please advise thanks

## 2023-11-02 RX ORDER — HYDROXYZINE HYDROCHLORIDE 25 MG/1
25 TABLET, FILM COATED ORAL 3 TIMES DAILY PRN
Qty: 30 TABLET | Refills: 0 | Status: SHIPPED | OUTPATIENT
Start: 2023-11-02 | End: 2023-11-09

## 2023-11-08 ENCOUNTER — LAB VISIT (OUTPATIENT)
Dept: LAB | Facility: HOSPITAL | Age: 78
End: 2023-11-08
Attending: STUDENT IN AN ORGANIZED HEALTH CARE EDUCATION/TRAINING PROGRAM
Payer: MEDICARE

## 2023-11-08 DIAGNOSIS — E78.2 MIXED HYPERLIPIDEMIA: ICD-10-CM

## 2023-11-08 DIAGNOSIS — Z00.00 MEDICARE ANNUAL WELLNESS VISIT, SUBSEQUENT: ICD-10-CM

## 2023-11-08 DIAGNOSIS — E55.9 VITAMIN D DEFICIENCY: ICD-10-CM

## 2023-11-08 DIAGNOSIS — E11.65 TYPE 2 DIABETES MELLITUS WITH HYPERGLYCEMIA, WITHOUT LONG-TERM CURRENT USE OF INSULIN: ICD-10-CM

## 2023-11-08 LAB
ALBUMIN SERPL-MCNC: 4.2 G/DL (ref 3.4–4.8)
ALBUMIN/GLOB SERPL: 1.6 RATIO (ref 1.1–2)
ALP SERPL-CCNC: 47 UNIT/L (ref 40–150)
ALT SERPL-CCNC: 32 UNIT/L (ref 0–55)
APPEARANCE UR: CLEAR
AST SERPL-CCNC: 19 UNIT/L (ref 5–34)
BACTERIA #/AREA URNS AUTO: ABNORMAL /HPF
BASOPHILS # BLD AUTO: 0.05 X10(3)/MCL
BASOPHILS NFR BLD AUTO: 0.7 %
BILIRUB SERPL-MCNC: 0.7 MG/DL
BILIRUB UR QL STRIP.AUTO: NEGATIVE
BUN SERPL-MCNC: 20.2 MG/DL (ref 8.4–25.7)
CALCIUM SERPL-MCNC: 9.6 MG/DL (ref 8.8–10)
CHLORIDE SERPL-SCNC: 102 MMOL/L (ref 98–107)
CHOLEST SERPL-MCNC: 195 MG/DL
CHOLEST/HDLC SERPL: 4 {RATIO} (ref 0–5)
CO2 SERPL-SCNC: 26 MMOL/L (ref 23–31)
COLOR UR AUTO: ABNORMAL
CREAT SERPL-MCNC: 0.84 MG/DL (ref 0.73–1.18)
DEPRECATED CALCIDIOL+CALCIFEROL SERPL-MC: 42.9 NG/ML (ref 30–80)
EOSINOPHIL # BLD AUTO: 0.03 X10(3)/MCL (ref 0–0.9)
EOSINOPHIL NFR BLD AUTO: 0.4 %
ERYTHROCYTE [DISTWIDTH] IN BLOOD BY AUTOMATED COUNT: 13.4 % (ref 11.5–17)
EST. AVERAGE GLUCOSE BLD GHB EST-MCNC: 151.3 MG/DL
GFR SERPLBLD CREATININE-BSD FMLA CKD-EPI: >60 MLS/MIN/1.73/M2
GLOBULIN SER-MCNC: 2.7 GM/DL (ref 2.4–3.5)
GLUCOSE SERPL-MCNC: 140 MG/DL (ref 82–115)
GLUCOSE UR QL STRIP.AUTO: ABNORMAL
HBA1C MFR BLD: 6.9 %
HCT VFR BLD AUTO: 47.8 % (ref 42–52)
HDLC SERPL-MCNC: 47 MG/DL (ref 35–60)
HGB BLD-MCNC: 15.4 G/DL (ref 14–18)
IMM GRANULOCYTES # BLD AUTO: 0.03 X10(3)/MCL (ref 0–0.04)
IMM GRANULOCYTES NFR BLD AUTO: 0.4 %
KETONES UR QL STRIP.AUTO: ABNORMAL
LDLC SERPL CALC-MCNC: 112 MG/DL (ref 50–140)
LEUKOCYTE ESTERASE UR QL STRIP.AUTO: NEGATIVE
LYMPHOCYTES # BLD AUTO: 1.48 X10(3)/MCL (ref 0.6–4.6)
LYMPHOCYTES NFR BLD AUTO: 20.6 %
MCH RBC QN AUTO: 30.7 PG (ref 27–31)
MCHC RBC AUTO-ENTMCNC: 32.2 G/DL (ref 33–36)
MCV RBC AUTO: 95.2 FL (ref 80–94)
MONOCYTES # BLD AUTO: 0.6 X10(3)/MCL (ref 0.1–1.3)
MONOCYTES NFR BLD AUTO: 8.3 %
MUCOUS THREADS URNS QL MICRO: ABNORMAL /LPF
NEUTROPHILS # BLD AUTO: 5 X10(3)/MCL (ref 2.1–9.2)
NEUTROPHILS NFR BLD AUTO: 69.6 %
NITRITE UR QL STRIP.AUTO: NEGATIVE
NRBC BLD AUTO-RTO: 0 %
PH UR STRIP.AUTO: 5.5 [PH]
PLATELET # BLD AUTO: 274 X10(3)/MCL (ref 130–400)
PMV BLD AUTO: 9.8 FL (ref 7.4–10.4)
POTASSIUM SERPL-SCNC: 4.4 MMOL/L (ref 3.5–5.1)
PROT SERPL-MCNC: 6.9 GM/DL (ref 5.8–7.6)
PROT UR QL STRIP.AUTO: NEGATIVE
RBC # BLD AUTO: 5.02 X10(6)/MCL (ref 4.7–6.1)
RBC #/AREA URNS AUTO: ABNORMAL /HPF
RBC UR QL AUTO: NEGATIVE
SODIUM SERPL-SCNC: 136 MMOL/L (ref 136–145)
SP GR UR STRIP.AUTO: 1.03 (ref 1–1.03)
SQUAMOUS #/AREA URNS LPF: ABNORMAL /HPF
TRIGL SERPL-MCNC: 180 MG/DL (ref 34–140)
TSH SERPL-ACNC: 1.45 UIU/ML (ref 0.35–4.94)
UROBILINOGEN UR STRIP-ACNC: NORMAL
VLDLC SERPL CALC-MCNC: 36 MG/DL
WBC # SPEC AUTO: 7.19 X10(3)/MCL (ref 4.5–11.5)
WBC #/AREA URNS AUTO: ABNORMAL /HPF

## 2023-11-08 PROCEDURE — 80053 COMPREHEN METABOLIC PANEL: CPT

## 2023-11-08 PROCEDURE — 85025 COMPLETE CBC W/AUTO DIFF WBC: CPT

## 2023-11-08 PROCEDURE — 81001 URINALYSIS AUTO W/SCOPE: CPT

## 2023-11-08 PROCEDURE — 83036 HEMOGLOBIN GLYCOSYLATED A1C: CPT

## 2023-11-08 PROCEDURE — 82306 VITAMIN D 25 HYDROXY: CPT

## 2023-11-08 PROCEDURE — 36415 COLL VENOUS BLD VENIPUNCTURE: CPT

## 2023-11-08 PROCEDURE — 84443 ASSAY THYROID STIM HORMONE: CPT

## 2023-11-08 PROCEDURE — 80061 LIPID PANEL: CPT

## 2023-11-09 ENCOUNTER — OFFICE VISIT (OUTPATIENT)
Dept: PRIMARY CARE CLINIC | Facility: CLINIC | Age: 78
End: 2023-11-09
Payer: MEDICARE

## 2023-11-09 VITALS
WEIGHT: 159.38 LBS | HEART RATE: 94 BPM | RESPIRATION RATE: 20 BRPM | TEMPERATURE: 98 F | BODY MASS INDEX: 24.15 KG/M2 | HEIGHT: 68 IN | OXYGEN SATURATION: 97 % | DIASTOLIC BLOOD PRESSURE: 82 MMHG | SYSTOLIC BLOOD PRESSURE: 126 MMHG

## 2023-11-09 DIAGNOSIS — Z00.00 MEDICARE ANNUAL WELLNESS VISIT, SUBSEQUENT: Primary | ICD-10-CM

## 2023-11-09 DIAGNOSIS — E11.9 TYPE 2 DIABETES MELLITUS WITHOUT COMPLICATION, WITHOUT LONG-TERM CURRENT USE OF INSULIN: ICD-10-CM

## 2023-11-09 DIAGNOSIS — Z23 NEED FOR IMMUNIZATION AGAINST INFLUENZA: ICD-10-CM

## 2023-11-09 DIAGNOSIS — L20.9 ATOPIC DERMATITIS, UNSPECIFIED TYPE: ICD-10-CM

## 2023-11-09 LAB
CREAT UR-MCNC: 53.3 MG/DL (ref 63–166)
MICROALBUMIN UR-MCNC: 9.4 UG/ML
MICROALBUMIN/CREAT RATIO PNL UR: 17.6 MG/GM CR (ref 0–30)

## 2023-11-09 PROCEDURE — G0439 PR MEDICARE ANNUAL WELLNESS SUBSEQUENT VISIT: ICD-10-PCS | Mod: ,,, | Performed by: STUDENT IN AN ORGANIZED HEALTH CARE EDUCATION/TRAINING PROGRAM

## 2023-11-09 PROCEDURE — G0008 FLU VACCINE - QUADRIVALENT - ADJUVANTED: ICD-10-PCS | Mod: ,,, | Performed by: STUDENT IN AN ORGANIZED HEALTH CARE EDUCATION/TRAINING PROGRAM

## 2023-11-09 PROCEDURE — G0439 PPPS, SUBSEQ VISIT: HCPCS | Mod: ,,, | Performed by: STUDENT IN AN ORGANIZED HEALTH CARE EDUCATION/TRAINING PROGRAM

## 2023-11-09 PROCEDURE — 90694 VACC AIIV4 NO PRSRV 0.5ML IM: CPT | Mod: ,,, | Performed by: STUDENT IN AN ORGANIZED HEALTH CARE EDUCATION/TRAINING PROGRAM

## 2023-11-09 PROCEDURE — 90694 FLU VACCINE - QUADRIVALENT - ADJUVANTED: ICD-10-PCS | Mod: ,,, | Performed by: STUDENT IN AN ORGANIZED HEALTH CARE EDUCATION/TRAINING PROGRAM

## 2023-11-09 PROCEDURE — G0008 ADMIN INFLUENZA VIRUS VAC: HCPCS | Mod: ,,, | Performed by: STUDENT IN AN ORGANIZED HEALTH CARE EDUCATION/TRAINING PROGRAM

## 2023-11-09 RX ORDER — TRIAMCINOLONE ACETONIDE 1 MG/G
CREAM TOPICAL 2 TIMES DAILY
Qty: 45 G | Refills: 0 | Status: SHIPPED | OUTPATIENT
Start: 2023-11-09

## 2023-11-09 NOTE — PROGRESS NOTES
"Patient ID: [id number]     Chief Complaint: Medicare AWV    HPI:     Clifford Warren is a 78 y.o. male here today for a Medicare Wellness.     No acute complaints. Continues to f/u with Neuro, Endocrine, Pain Management, and GI.    Opioid Screening: Patient medication list reviewed, patient is not taking prescription opioids. Patient is not using additional opioids than prescribed. Patient is at low risk of substance abuse based on this opioid use history.     ----------------------------  ADHD (attention deficit hyperactivity disorder)  Cancer      Comment:  skin cancer  Depression  Diabetes mellitus, type 2  [place] (hard of hearing)  Loss of equilibrium  Parkinson's disease  S/P epidural steroid injection  Sleep apnea, unspecified      Comment:  "not using Cpap"  Smoker     Past Surgical History:   Procedure Laterality Date    CATARACT EXTRACTION, BILATERAL  2021    CHOLECYSTECTOMY  2005    COLONOSCOPY      exc growth eyelid      HERNIA REPAIR      INJECTION OF ANESTHETIC AGENT AROUND MEDIAL BRANCH NERVES INNERVATING LUMBAR FACET JOINT Left 09/13/2023    Procedure: Block-nerve-medial branch-lumbar;  Surgeon: Diana Turcios MD;  Location: [facility] OR;  Service: Pain Management;  Laterality: Left;  Lt L3, L4 L5    INJECTION OF ANESTHETIC AGENT AROUND MEDIAL BRANCH NERVES INNERVATING LUMBAR FACET JOINT Left 11/1/2023    Procedure: Block-nerve-medial branch-lumbar;  Surgeon: Diana Turcios MD;  Location: [facility] OR;  Service: Pain Management;  Laterality: Left;  Left MBB L3, L4, L5    INJECTION OF STEROID Left 09/27/2023    left shoulder steroid injection-Dr Hou    TONSILLECTOMY  1950       Review of patient's allergies indicates:  No Known Allergies    Outpatient Medications Marked as Taking for the 11/9/23 encounter (Office Visit) with Edu Morrow MD   Medication Sig Dispense Refill    acetaminophen (TYLENOL) 325 MG tablet Take 325 mg by mouth every 6 (six) hours as needed for Pain.      blood-glucose " meter kit by Other route. Use as instructed    Accu-check guide  Accu-check fastclix lancets      carbidopa-levodopa  mg (SINEMET)  mg per tablet Take 1 tablet by mouth 2 (two) times a day. 180 tablet 2    dulaglutide (TRULICITY) 1.5 mg/0.5 mL pen injector Inject into the skin every 7 days.      DULoxetine (CYMBALTA) 30 MG capsule Take 1 capsule (30 mg total) by mouth once daily. 90 capsule 1    ibuprofen (ADVIL,MOTRIN) 200 MG tablet Take 200 mg by mouth every 6 (six) hours as needed for Pain.      JARDIANCE 25 mg tablet Take 25 mg by mouth once daily.      lubiprostone (AMITIZA) 24 MCG Cap Take 1 capsule (24 mcg total) by mouth 2 (two) times daily with meals. 60 capsule 11    meloxicam (MOBIC) 7.5 MG tablet TAKE 1 TO 2 TABLETS BY MOUTH EVERY DAY WITH FOOD AS NEEDED FOR BACK PAIN 60 tablet 1    rosuvastatin (CRESTOR) 20 MG tablet Take 1 tablet (20 mg total) by mouth every evening. 90 tablet 3       Social History     Socioeconomic History    Marital status:    Tobacco Use    Smoking status: Former     Current packs/day: 0.00     Average packs/day: 1 pack/day for 15.0 years (15.0 ttl pk-yrs)     Types: Cigarettes     Start date:      Quit date:      Years since quittin.8     Passive exposure: Current    Smokeless tobacco: Never   Substance and Sexual Activity    Alcohol use: Never    Drug use: Never    Sexual activity: Yes     Partners: Female     Birth control/protection: None     Social Determinants of Health     Financial Resource Strain: Low Risk  (4/10/2023)    Overall Financial Resource Strain (CARDIA)     Difficulty of Paying Living Expenses: Not hard at all   Physical Activity: Inactive (4/10/2023)    Exercise Vital Sign     Days of Exercise per Week: 0 days     Minutes of Exercise per Session: 0 min   Housing Stability: Low Risk  (4/10/2023)    Housing Stability Vital Sign     Unable to Pay for Housing in the Last Year: No     Number of Places Lived in the Last Year: 1      Unstable Housing in the Last Year: No        Family History   Problem Relation Age of Onset    Diabetes Mother     Hearing loss Mother     Stroke Mother     Cancer Father         bladder ca    Dementia Father     Depression Father     Learning disabilities Sister         Patient Care Team:  Edu Morrow MD as PCP - General (Internal Medicine)  Feliciano Escalante Jr., DPM as Consulting Physician (Podiatry)  Jeffry Gar MD as Consulting Physician (Endocrinology)  Jose Ramon Palma MD (Ophthalmology)  Candelario Moore III, MD (Family Medicine)  Farhan Sykes MD as Consulting Physician (Neurology)  Jose Ramon Rea MD as Consulting Physician (Gastroenterology)       Subjective:     Review of Systems   Constitutional:  Negative for chills and fever.   HENT:  Negative for sinus pressure/congestion and sore throat.    Respiratory:  Negative for cough, shortness of breath and wheezing.    Cardiovascular:  Negative for chest pain and leg swelling.   Gastrointestinal:  Negative for abdominal pain, nausea and vomiting.   Genitourinary:  Negative for dysuria and hematuria.   Musculoskeletal:  Positive for gait problem. Negative for arthralgias and myalgias.   Integumentary:  Negative for rash.   Neurological:  Positive for dizziness. Negative for syncope.   Hematological:  Negative for adenopathy.   Psychiatric/Behavioral:  Positive for confusion. The patient is not nervous/anxious.        Patient Reported Health Risk Assessment  What [age]?: 70-79  Are you male or female?: Male  During the past four weeks, how much have you been bothered by emotional problems such as feeling anxious, depressed, irritable, sad, or downhearted and blue?: Not at all  During the past five weeks, has your physical and/or emotional health limited your social activities with family, friends, neighbors, or groups?: Not at all  During the past four weeks, how much bodily pain have you generally had?: Mild pain  During the  past four weeks, was someone available to help if you needed and wanted help?: Yes, as much as I wanted  During the past four weeks, what was the hardest physical activity you could do for at least two minutes?: Light  Can you get to places out of walking distance without help?  (For example, can you travel alone on buses or taxis, or drive your own car?): Yes  Can you go shopping for groceries or clothes without someone's help?: Yes  Can you prepare your own meals?: Yes  Can you do your own housework without help?: Yes  Because of any health problems, do you need the help of another person with your personal care needs such as eating, bathing, dressing, or getting around the house?: No  Can you handle your own money without help?: No  During the past four weeks, how would you rate your health in general?: Very good  How have things been going for you during the past four weeks?: Pretty well  Are you having difficulties driving your car?: Not applicable, I do not use a car  Do you always fasten your seat belt when you are in a car?: Yes, usually  How often in the past four weeks have you been bothered by falling or dizzy when standing up?: Seldom  How often in the past four weeks have you been bothered by sexual problems?: Never  How often in the past four weeks have you been bothered by trouble eating well?: Never  How often in the past four weeks have you been bothered by teeth or denture problems?: Never  How often in the past four weeks have you been bothered with problems using the telephone?: Never  How often in the past four weeks have you been bothered by tiredness or fatigue?: Seldom  Have you fallen two or more times in the past year?: Yes  Are you afraid of falling?: No  Are you a smoker?: No  During the past four weeks, how many drinks of wine, beer, or other alcoholic beverages did you have?: No alcohol at all  Do you exercise for about 20 minutes three or more days a week?: Yes, most of the time  Have  "you been given any information to help you with hazards in your house that might hurt you?: Yes  Have you been given any information to help you with keeping track of your medications?: Yes  How often do you have trouble taking medicines the way you've been told to take them?: I always take them as prescribed  How confident are you that you can control and manage most of your health problems?: Very confident  What is your race? (Check all that apply.):     Objective:     /82 (BP Location: Left arm, Patient Position: Sitting)   Pulse 94   Temp 98.1 °F (36.7 °C)   Resp 20   Ht 5' 8" (1.727 m)   Wt 72.3 kg (159 lb 6.4 oz)   SpO2 97%   BMI 24.24 kg/m²     Physical Exam  Vitals and nursing note reviewed.   Constitutional:       General: He is not in acute distress.  HENT:      Head: Normocephalic.      Nose: No rhinorrhea.   Eyes:      Conjunctiva/sclera: Conjunctivae normal.      Pupils: Pupils are equal, round, and reactive to light.   Cardiovascular:      Rate and Rhythm: Normal rate and regular rhythm.   Pulmonary:      Effort: Pulmonary effort is normal.      Breath sounds: Normal breath sounds.   Abdominal:      Palpations: Abdomen is soft.      Tenderness: There is no abdominal tenderness.   Musculoskeletal:         General: No deformity or signs of injury.   Skin:     General: Skin is warm and dry.   Neurological:      General: No focal deficit present.      Mental Status: He is alert. Mental status is at baseline.      Gait: Gait abnormal (unsteady).   Psychiatric:         Mood and Affect: Mood normal.         Behavior: Behavior normal.      Comments: Memory impaired            No data to display                  11/9/2023     1:20 PM 10/20/2023    10:30 AM 9/27/2023     2:00 PM 8/16/2023    11:30 AM 7/31/2023    10:00 AM 7/10/2023     2:20 PM 6/19/2023    11:30 AM   Fall Risk Assessment - Outpatient   Mobility Status Ambulatory w/ assistance Ambulatory Ambulatory Ambulatory Ambulatory " Ambulatory Ambulatory   Number of falls 2+ 0 2+ 0 2+ 1 with injury 0   Identified as fall risk True False True False True True False           Depression Screening  Over the past two weeks, has the patient felt down, depressed, or hopeless?: No  Over the past two weeks, has the patient felt little interest or pleasure in doing things?: No  Functional Ability/Safety Screening  Was the patient's timed Up & Go test unsteady or longer than 30 seconds?: No  Does the patient need help with phone, transportation, shopping, preparing meals, housework, laundry, meds, or managing money?: No  Does the patient's home have rugs in the hallway, lack grab bars in the bathroom, lack handrails on the stairs or have poor lighting?: No  Have you noticed any hearing difficulties?: No  Cognitive Function (Assessed through direct observation with due consideration of information obtained by way of patient reports and/or concerns raised by family, friends, caretakers, or others)    Does the patient repeat questions/statements in the same day?: No  Does the patient have trouble remembering the date, year, and time?: No  Does the patient have difficulty managing finances?: No  Does the patient have a decreased sense of direction?: No  Assessment/Plan:     1. Medicare annual wellness visit, subsequent  Assessment & Plan:  Reviewed recent wellness labs. See below for health maintenance.    Vaccinations:   - Flu: given today in office 11/9/23   - Pneumonia: Pt reports receiving PCV13 and PCV23 after age 65   - Shingles (>50): declines   - Tdap: received 2020   - COVID: received x 5  Screening:   - Prostate (>45): n/a sec to age and comorbidities   - Lung Ca. Screening (50-80 with >20 pack yrs current or quit <15 yrs): n/a   - Colon Ca. Screening (>45): n/a sec to age and comorbidities   - AAA (65-75 if ever smoked): n/a      2. Need for immunization against influenza  -     Influenza (FLUAD) - Quadrivalent (Adjuvanted) *Preferred* (65+)  (PF)    3. Type 2 diabetes mellitus without complication, without long-term current use of insulin  -     Microalbumin/Creatinine Ratio, Urine    4. Atopic dermatitis, unspecified type  -     triamcinolone acetonide 0.1% (KENALOG) 0.1 % cream; Apply topically 2 (two) times daily. Apply to red itchy rash on leg. Do not use on face.  Dispense: 45 g; Refill: 0       Medicare Annual Wellness and Personalized Prevention Plan:   Fall Risk + Home Safety + Hearing Impairment + Depression Screen + Opioid and Substance Abuse Screening + Cognitive Impairment Screen + Health Risk Assessment all reviewed.     Health Maintenance Topics with due status: Not Due       Topic Last Completion Date    TETANUS VACCINE 06/10/2020    Lipid Panel 11/08/2023    Hemoglobin A1c 11/08/2023    Diabetes Urine Screening 11/09/2023      The patient's Health Maintenance was reviewed and the following appears to be due at this time:   Health Maintenance Due   Topic Date Due    Hepatitis C Screening  Never done    Pneumococcal Vaccines (Age 65+) (1 - PCV) Never done    Shingles Vaccine (1 of 2) Never done    RSV Vaccine (Age 60+) (1 - 1-dose 60+ series) Never done    Eye Exam  04/13/2023    COVID-19 Vaccine (6 - 2023-24 season) 09/01/2023       Advance Care Planning   I attest to discussing Advance Care Planning with patient and/or family member.  Education was provided including the importance of the Health Care Power of , Advance Directives, and/or [place] documentation.  The patient expressed understanding to the importance of this information and discussion.       Follow up in about 3 months (around 2/9/2024) for HTN. In addition to their scheduled follow up, the patient has also been instructed to follow up on as needed basis.

## 2023-11-09 NOTE — ASSESSMENT & PLAN NOTE
Reviewed recent wellness labs. See below for health maintenance.    Vaccinations:   - Flu: given today in office 11/9/23   - Pneumonia: Pt reports receiving PCV13 and PCV23 after age 65   - Shingles (>50): declines   - Tdap: received 2020   - COVID: received x 5  Screening:   - Prostate (>45): n/a sec to age and comorbidities   - Lung Ca. Screening (50-80 with >20 pack yrs current or quit <15 yrs): n/a   - Colon Ca. Screening (>45): n/a sec to age and comorbidities   - AAA (65-75 if ever smoked): n/a

## 2023-11-10 DIAGNOSIS — Z00.00 MEDICARE ANNUAL WELLNESS VISIT, SUBSEQUENT: Primary | ICD-10-CM

## 2023-11-10 DIAGNOSIS — Z12.5 ENCOUNTER FOR SCREENING FOR MALIGNANT NEOPLASM OF PROSTATE: ICD-10-CM

## 2023-11-17 ENCOUNTER — LAB VISIT (OUTPATIENT)
Dept: LAB | Facility: HOSPITAL | Age: 78
End: 2023-11-17
Attending: STUDENT IN AN ORGANIZED HEALTH CARE EDUCATION/TRAINING PROGRAM
Payer: MEDICARE

## 2023-11-17 DIAGNOSIS — Z12.5 ENCOUNTER FOR SCREENING FOR MALIGNANT NEOPLASM OF PROSTATE: ICD-10-CM

## 2023-11-17 DIAGNOSIS — Z00.00 MEDICARE ANNUAL WELLNESS VISIT, SUBSEQUENT: ICD-10-CM

## 2023-11-17 LAB — PSA SERPL-MCNC: 2.3 NG/ML

## 2023-11-17 PROCEDURE — 36415 COLL VENOUS BLD VENIPUNCTURE: CPT

## 2023-11-17 PROCEDURE — 84153 ASSAY OF PSA TOTAL: CPT

## 2023-12-08 ENCOUNTER — TELEPHONE (OUTPATIENT)
Dept: PRIMARY CARE CLINIC | Facility: CLINIC | Age: 78
End: 2023-12-08
Payer: MEDICARE

## 2024-01-28 DIAGNOSIS — E11.65 TYPE 2 DIABETES MELLITUS WITH HYPERGLYCEMIA, WITHOUT LONG-TERM CURRENT USE OF INSULIN: ICD-10-CM

## 2024-01-29 RX ORDER — ROSUVASTATIN CALCIUM 20 MG/1
20 TABLET, COATED ORAL NIGHTLY
Qty: 90 TABLET | Refills: 3 | Status: SHIPPED | OUTPATIENT
Start: 2024-01-29

## 2024-01-30 ENCOUNTER — OFFICE VISIT (OUTPATIENT)
Dept: NEUROLOGY | Facility: CLINIC | Age: 79
End: 2024-01-30
Payer: MEDICARE

## 2024-01-30 VITALS
SYSTOLIC BLOOD PRESSURE: 118 MMHG | WEIGHT: 154 LBS | BODY MASS INDEX: 23.34 KG/M2 | DIASTOLIC BLOOD PRESSURE: 82 MMHG | HEIGHT: 68 IN

## 2024-01-30 DIAGNOSIS — E11.65 TYPE 2 DIABETES MELLITUS WITH HYPERGLYCEMIA, WITHOUT LONG-TERM CURRENT USE OF INSULIN: ICD-10-CM

## 2024-01-30 DIAGNOSIS — G20.C PARKINSONISM, UNSPECIFIED PARKINSONISM TYPE: ICD-10-CM
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
## 2024-01-30 PROBLEM — E11.42 TYPE 2 DIABETES MELLITUS WITH DIABETIC POLYNEUROPATHY, WITHOUT LONG-TERM CURRENT USE OF INSULIN: Status: ACTIVE | Noted: 2022-11-21

## 2024-01-30 PROCEDURE — 99214 OFFICE O/P EST MOD 30 MIN: CPT | Mod: S$PBB,,, | Performed by: NURSE PRACTITIONER

## 2024-01-30 PROCEDURE — 99999 PR PBB SHADOW E&M-EST. PATIENT-LVL III: CPT | Mod: PBBFAC,,, | Performed by: NURSE PRACTITIONER

## 2024-01-30 PROCEDURE — 99213 OFFICE O/P EST LOW 20 MIN: CPT | Mod: PBBFAC | Performed by: NURSE PRACTITIONER

## 2024-01-30 RX ORDER — VIBEGRON 75 MG/1
1 TABLET, FILM COATED ORAL DAILY
COMMUNITY
Start: 2023-12-31

## 2024-01-30 RX ORDER — CARBIDOPA AND LEVODOPA 25; 100 MG/1; MG/1
1 TABLET ORAL 4 TIMES DAILY
Qty: 360 TABLET | Refills: 2 | Status: SHIPPED | OUTPATIENT
Start: 2024-01-30 | End: 2024-05-06

## 2024-01-30 NOTE — PROGRESS NOTES
"  Neurology Follow up Note    Subjective:         Patient ID: Clifford Warren is a 78 y.o. male.    Chief Complaint: 3 month PD f/u    HPI:            Pt reports tremors well controlled w medications; taking Sinemet (1 tab BID-TID). C/o constipation and unchanged dizziness. Continues w unsteady gait, use of cane most times, had fall last week trying to walk on a towel as to not get the floor wet from stepping out the bath. Mood and pain has improved since starting Duloxetine 30 mg daily.     Denies hallucinations  Sleeping well  Denies choking     Some double vision since his cataract surgery 3 years ago, scheduled to see optho this afternoon.     ROS: as per HPI, otherwise pertinent systems review is negative          Past Medical History:   Diagnosis Date    ADHD (attention deficit hyperactivity disorder)     Cancer     skin cancer    Depression     Diabetes mellitus, type 2     [place] (hard of hearing)     Loss of equilibrium     Parkinson's disease     S/P epidural steroid injection     Sleep apnea, unspecified     "not using Cpap"    Smoker        Past Surgical History:   Procedure Laterality Date    CATARACT EXTRACTION, BILATERAL  2021    CHOLECYSTECTOMY  2005    COLONOSCOPY      exc growth eyelid      HERNIA REPAIR      INJECTION OF ANESTHETIC AGENT AROUND MEDIAL BRANCH NERVES INNERVATING LUMBAR FACET JOINT Left 09/13/2023    Procedure: Block-nerve-medial branch-lumbar;  Surgeon: Diana Turcios MD;  Location: [facility] OR;  Service: Pain Management;  Laterality: Left;  Lt L3, L4 L5    INJECTION OF ANESTHETIC AGENT AROUND MEDIAL BRANCH NERVES INNERVATING LUMBAR FACET JOINT Left 11/1/2023    Procedure: Block-nerve-medial branch-lumbar;  Surgeon: Diana Turcios MD;  Location: [facility] OR;  Service: Pain Management;  Laterality: Left;  Left MBB L3, L4, L5    INJECTION OF STEROID Left 09/27/2023    left shoulder steroid injection-Dr Hou    TONSILLECTOMY  1950       Family History   Problem Relation Age of " Onset    Diabetes Mother     Hearing loss Mother     Stroke Mother     Cancer Father         bladder ca    Dementia Father     Depression Father     Learning disabilities Sister        Social History     Socioeconomic History    Marital status:    Tobacco Use    Smoking status: Former     Current packs/day: 0.00     Average packs/day: 1 pack/day for 15.0 years (15.0 ttl pk-yrs)     Types: Cigarettes     Start date:      Quit date:      Years since quittin.0     Passive exposure: Current    Smokeless tobacco: Never   Substance and Sexual Activity    Alcohol use: Never    Drug use: Never    Sexual activity: Yes     Partners: Female     Birth control/protection: None     Social Determinants of Health     Financial Resource Strain: Low Risk  (4/10/2023)    Overall Financial Resource Strain (CARDIA)     Difficulty of Paying Living Expenses: Not hard at all   Physical Activity: Inactive (4/10/2023)    Exercise Vital Sign     Days of Exercise per Week: 0 days     Minutes of Exercise per Session: 0 min   Housing Stability: Low Risk  (4/10/2023)    Housing Stability Vital Sign     Unable to Pay for Housing in the Last Year: No     Number of Places Lived in the Last Year: 1     Unstable Housing in the Last Year: No       Review of patient's allergies indicates:  No Known Allergies    Current Outpatient Medications   Medication Instructions    acetaminophen (TYLENOL) 325 mg, Oral, Every 6 hours PRN    blood-glucose meter kit Other, Use as instructed <BR> Accu-check guide<BR>Accu-check fastclix lancets    carbidopa-levodopa  mg (SINEMET)  mg per tablet 1 tablet, Oral, 2 times daily    dulaglutide (TRULICITY) 1.5 mg/0.5 mL pen injector Subcutaneous, Every 7 days    DULoxetine (CYMBALTA) 30 mg, Oral, Daily    GEMTESA 75 mg Tab 1 tablet, Oral, Daily    JARDIANCE 25 mg, Oral, Daily    lubiprostone (AMITIZA) 24 mcg, Oral, 2 times daily with meals    meloxicam (MOBIC) 7.5 MG tablet TAKE 1 TO 2 TABLETS  "BY MOUTH EVERY DAY WITH FOOD AS NEEDED FOR BACK PAIN    rosuvastatin (CRESTOR) 20 mg, Oral, Nightly    triamcinolone acetonide 0.1% (KENALOG) 0.1 % cream Topical (Top), 2 times daily, Apply to red itchy rash on leg. Do not use on face.       Objective:      Exam:   Visit Vitals  /82 (BP Location: Left arm, Patient Position: Sitting)   Ht 5' 8" (1.727 m)   Wt 69.9 kg (154 lb)   BMI 23.42 kg/m²       Physical Exam  Vitals reviewed.   Constitutional:       Appearance: Parkinsonian; decreased eye blink and mask face;      bradykinetic     Accompanied by: wife  HENT:      Ears:      Comments: [place]; requires hearing aids but did not have today  Eyes:      Extraocular Movements: Extraocular movements intact. No       vertical/horizontal deficits     VF's ok  Cardiovascular:      Rate and Rhythm: Normal rate and regular rhythm.   Pulmonary:      Effort: Pulmonary effort is normal.      Breath sounds: Normal breath sounds.   Musculoskeletal:         General: Normal range of motion.   Skin:     General: Skin is warm and dry.   Neurological:      General: No focal deficit present.      Mental Status:     Speech: dysarthric at times     Face: symmetric; tongue midline     Motor: nonlateralizing; slow to raise from the chair     F to N bradykinetic     R wrist cogwheel rigidity with potentiation     Impaired Heather R>L     Tremor: none today      Gait: Strong Arm comfort cane, bradykinetic, short stride, no arm swing,      turn en bloc, no tremor  Psychiatric:         Mood and Affect: Mood normal.         Behavior: Behavior normal.         Assessment/Plan:   1. Parkinsonism, unspecified Parkinsonism type  Increase the CD/LD to 1 tab four times per day [7,11,3,7]    Reminded patient/family about potential PD med side effects, which include but not limited to impulse control disorders (ex: pathologic gambling, shopping, or preoccupation with sexual thoughts or behaviors), excessive daytime sleepiness, hallucinations and sleep " attacks. Discussed that driving may pose an increased risk to patients on these medications. Hypotension is also occasionally associated with Parkinson's medications. Any of these complications should be reported to the prescribing physician or provider so that appropriate further modifications can occur.    Fall precautions discussed    Driving discussed     2. Type 2 diabetes mellitus with hyperglycemia, without long-term current use of insulin  Management per PCP   Diet exercise and weight loss encouraged    3. Fall, subsequent encounter  Advise he be careful  [place] cane at all times     Referral to PT eval and treat [Fyzical]      Follow up in about 3 months (around 4/30/2024), or if symptoms worsen or fail to improve, for Parkinson's Disease.      Saul Herrera, MSN, APRN, AGACNP-BC

## 2024-02-11 DIAGNOSIS — M54.16 LUMBAR RADICULAR PAIN: ICD-10-CM

## 2024-02-11 DIAGNOSIS — F41.9 ANXIETY: ICD-10-CM

## 2024-02-12 ENCOUNTER — OFFICE VISIT (OUTPATIENT)
Dept: PRIMARY CARE CLINIC | Facility: CLINIC | Age: 79
End: 2024-02-12
Payer: MEDICARE

## 2024-02-12 VITALS
RESPIRATION RATE: 18 BRPM | TEMPERATURE: 98 F | OXYGEN SATURATION: 98 % | HEIGHT: 68 IN | BODY MASS INDEX: 23.4 KG/M2 | SYSTOLIC BLOOD PRESSURE: 118 MMHG | HEART RATE: 88 BPM | DIASTOLIC BLOOD PRESSURE: 74 MMHG | WEIGHT: 154.38 LBS

## 2024-02-12 DIAGNOSIS — F33.1 MODERATE EPISODE OF RECURRENT MAJOR DEPRESSIVE DISORDER: ICD-10-CM

## 2024-02-12 DIAGNOSIS — K59.09 CHRONIC CONSTIPATION: Primary | ICD-10-CM

## 2024-02-12 DIAGNOSIS — E11.9 TYPE 2 DIABETES MELLITUS WITHOUT COMPLICATION, WITHOUT LONG-TERM CURRENT USE OF INSULIN: ICD-10-CM

## 2024-02-12 PROBLEM — Z00.00 MEDICARE ANNUAL WELLNESS VISIT, SUBSEQUENT: Status: RESOLVED | Noted: 2023-11-09 | Resolved: 2024-02-12

## 2024-02-12 LAB — HBA1C MFR BLD: 6.8 %

## 2024-02-12 PROCEDURE — 83036 HEMOGLOBIN GLYCOSYLATED A1C: CPT | Mod: QW,,, | Performed by: STUDENT IN AN ORGANIZED HEALTH CARE EDUCATION/TRAINING PROGRAM

## 2024-02-12 PROCEDURE — 99214 OFFICE O/P EST MOD 30 MIN: CPT | Mod: ,,, | Performed by: STUDENT IN AN ORGANIZED HEALTH CARE EDUCATION/TRAINING PROGRAM

## 2024-02-12 RX ORDER — BUPROPION HYDROCHLORIDE 150 MG/1
150 TABLET ORAL DAILY
Qty: 30 TABLET | Refills: 11 | Status: SHIPPED | OUTPATIENT
Start: 2024-02-12 | End: 2024-05-06

## 2024-02-12 RX ORDER — DULOXETIN HYDROCHLORIDE 30 MG/1
30 CAPSULE, DELAYED RELEASE ORAL
Qty: 90 CAPSULE | Refills: 1 | Status: SHIPPED | OUTPATIENT
Start: 2024-02-12 | End: 2024-05-06

## 2024-02-12 NOTE — PROGRESS NOTES
"Subjective:       Patient ID: Clifford Warren is a 79 y.o. male.    ----------------------------  ADHD (attention deficit hyperactivity disorder)  Cancer      Comment:  skin cancer  Depression  Diabetes mellitus, type 2  [place] (hard of hearing)  Loss of equilibrium  Parkinson's disease  S/P epidural steroid injection  Sleep apnea, unspecified      Comment:  "not using Cpap"  Smoker     Chief Complaint: Follow-up    Constipation. Doing better on Amitiza. Occasionally having breakthrough constipation.     DM2 - POC A1c 6.8% today. Following Endo.    Depression - not controlled, c/o constant low energy, low interest in hobbies/enjoyable activities, impaired sleep, etc      Review of Systems   Constitutional:  Negative for chills and fever.   HENT:  Negative for sinus pressure/congestion and sore throat.    Respiratory:  Negative for cough, shortness of breath and wheezing.    Cardiovascular:  Negative for chest pain and leg swelling.   Gastrointestinal:  Positive for constipation. Negative for abdominal pain, nausea and vomiting.   Genitourinary:  Negative for dysuria and hematuria.   Musculoskeletal:  Positive for gait problem. Negative for arthralgias and myalgias.   Integumentary:  Negative for rash.   Neurological:  Negative for dizziness and syncope.   Hematological:  Negative for adenopathy.   Psychiatric/Behavioral:  Positive for confusion and dysphoric mood.            Objective:      Physical Exam  Vitals and nursing note reviewed.   Constitutional:       General: He is not in acute distress.  HENT:      Head: Normocephalic.      Nose: No rhinorrhea.   Eyes:      Conjunctiva/sclera: Conjunctivae normal.      Pupils: Pupils are equal, round, and reactive to light.   Cardiovascular:      Rate and Rhythm: Normal rate and regular rhythm.   Pulmonary:      Effort: Pulmonary effort is normal.      Breath sounds: Normal breath sounds.   Abdominal:      Palpations: Abdomen is soft.      Tenderness: There is no " abdominal tenderness.   Musculoskeletal:         General: No deformity or signs of injury.   Skin:     General: Skin is warm and dry.   Neurological:      General: No focal deficit present.      Mental Status: He is alert. Mental status is at baseline.      Gait: Gait abnormal (unsteady).   Psychiatric:         Mood and Affect: Mood normal.         Behavior: Behavior normal.      Comments: Memory impaired           Assessment & Plan:   1. Chronic constipation  Assessment & Plan:  Doing better with Amitiza.  Discussed options for breakthru constipation including bisacodyl, Miralax, the Mg citrate in order of escalating intensity      2. Type 2 diabetes mellitus without complication, without long-term current use of insulin  Assessment & Plan:  Controlled  Continue Jardiance 25 mg daily and Trulicity 1.5 mg weekly    Orders:  -     POCT HEMOGLOBIN A1C    3. Moderate episode of recurrent major depressive disorder  Assessment & Plan:  Main complaint is poor energy  Start trial of Wellbutrin 150 mg daily  Must consider that depressed mood and low energy are manifestations of his Parkinsonism     Orders:  -     buPROPion (WELLBUTRIN XL) 150 MG TB24 tablet; Take 1 tablet (150 mg total) by mouth once daily.  Dispense: 30 tablet; Refill: 11      Follow up in about 3 months (around 5/12/2024) for Diabetes, Constipation. In addition to their scheduled follow up, the patient has also been instructed to follow up on as needed basis.

## 2024-02-12 NOTE — ASSESSMENT & PLAN NOTE
Doing better with Amitiza.  Discussed options for breakthru constipation including bisacodyl, Miralax, the Mg citrate in order of escalating intensity

## 2024-02-26 PROBLEM — F33.1 MODERATE EPISODE OF RECURRENT MAJOR DEPRESSIVE DISORDER: Status: ACTIVE | Noted: 2024-02-26

## 2024-02-26 NOTE — ASSESSMENT & PLAN NOTE
Main complaint is poor energy  Start trial of Wellbutrin 150 mg daily  Must consider that depressed mood and low energy are manifestations of his Parkinsonism

## 2024-03-18 ENCOUNTER — TELEPHONE (OUTPATIENT)
Dept: PRIMARY CARE CLINIC | Facility: CLINIC | Age: 79
End: 2024-03-18
Payer: MEDICARE

## 2024-03-18 NOTE — TELEPHONE ENCOUNTER
Pt's wife called stating that she took Mr. Horton off of Wellbutrin and Cymbalta on February 7th because he became very disoriented and could not stand without assistance. He has not had an episode since being off of the medication but would like to start the medication again. She is asking if he should try only Wellbutrin or Cymbalta separately or should he try taking both medications at the same time again. She is also asking about a referral for home health for Mr. Horton.

## 2024-03-20 NOTE — TELEPHONE ENCOUNTER
Since the Wellbutrin was the most recent addition, let's hold off on that one. Wellbutrin is also the more likely of the two to cause disorientation.     I'd like him to restart the Cymbalta at this time. Monitor for side effects.      Edu Morrow MD

## 2024-05-06 ENCOUNTER — OFFICE VISIT (OUTPATIENT)
Dept: NEUROLOGY | Facility: CLINIC | Age: 79
End: 2024-05-06
Payer: MEDICARE

## 2024-05-06 VITALS
BODY MASS INDEX: 23.34 KG/M2 | DIASTOLIC BLOOD PRESSURE: 70 MMHG | SYSTOLIC BLOOD PRESSURE: 112 MMHG | WEIGHT: 154 LBS | HEIGHT: 68 IN

## 2024-05-06 DIAGNOSIS — G20.C PARKINSONISM, UNSPECIFIED PARKINSONISM TYPE: ICD-10-CM

## 2024-05-06 DIAGNOSIS — F33.1 MODERATE EPISODE OF RECURRENT MAJOR DEPRESSIVE DISORDER: ICD-10-CM

## 2024-05-06 DIAGNOSIS — F41.9 ANXIETY: ICD-10-CM

## 2024-05-06 DIAGNOSIS — G20.A1 PARKINSON'S DISEASE WITHOUT DYSKINESIA OR FLUCTUATING MANIFESTATIONS: Primary | ICD-10-CM

## 2024-05-06 PROCEDURE — 99999 PR PBB SHADOW E&M-EST. PATIENT-LVL III: CPT | Mod: PBBFAC,,, | Performed by: NURSE PRACTITIONER

## 2024-05-06 PROCEDURE — 99213 OFFICE O/P EST LOW 20 MIN: CPT | Mod: PBBFAC | Performed by: NURSE PRACTITIONER

## 2024-05-06 PROCEDURE — 99214 OFFICE O/P EST MOD 30 MIN: CPT | Mod: S$PBB,,, | Performed by: NURSE PRACTITIONER

## 2024-05-06 RX ORDER — CARBIDOPA AND LEVODOPA 25; 100 MG/1; MG/1
1 TABLET ORAL 4 TIMES DAILY
Qty: 360 TABLET | Refills: 2 | Status: SHIPPED | OUTPATIENT
Start: 2024-05-06

## 2024-05-06 RX ORDER — BUPROPION HYDROCHLORIDE 150 MG/1
150 TABLET ORAL DAILY
Qty: 30 TABLET | Refills: 2
Start: 2024-05-06 | End: 2024-05-13 | Stop reason: SDUPTHER

## 2024-05-06 NOTE — PROGRESS NOTES
"  Neurology Follow up Note    Subjective:         Patient ID: Clifford Warren is a 79 y.o. male.    Chief Complaint: Tremors    HPI:            Pt reports to clinic for 3 month follow up for PD.     Wife reports unsteady balance. Completed PT     Pt has been restless, unable to sleep through the night. RBD symptoms - kicking and punching     C/o constipation.     Denies vivid dreams or hallucinations.     Reports some drooling, speech, and swallowing.     Continues taking Sinemet QID.    ROS: as per HPI, otherwise pertinent systems review is negative          Past Medical History:   Diagnosis Date    ADHD (attention deficit hyperactivity disorder)     Cancer     skin cancer    Depression     Diabetes mellitus, type 2     [place] (hard of hearing)     Loss of equilibrium     Parkinson's disease     S/P epidural steroid injection     Sleep apnea, unspecified     "not using Cpap"    Smoker        Past Surgical History:   Procedure Laterality Date    CATARACT EXTRACTION, BILATERAL  2021    CHOLECYSTECTOMY  2005    COLONOSCOPY      exc growth eyelid      HERNIA REPAIR      INJECTION OF ANESTHETIC AGENT AROUND MEDIAL BRANCH NERVES INNERVATING LUMBAR FACET JOINT Left 09/13/2023    Procedure: Block-nerve-medial branch-lumbar;  Surgeon: Diana Turcios MD;  Location: [facility] OR;  Service: Pain Management;  Laterality: Left;  Lt L3, L4 L5    INJECTION OF ANESTHETIC AGENT AROUND MEDIAL BRANCH NERVES INNERVATING LUMBAR FACET JOINT Left 11/1/2023    Procedure: Block-nerve-medial branch-lumbar;  Surgeon: Diana Turcios MD;  Location: [facility] OR;  Service: Pain Management;  Laterality: Left;  Left MBB L3, L4, L5    INJECTION OF STEROID Left 09/27/2023    left shoulder steroid injection-Dr Hou    TONSILLECTOMY  1950       Family History   Problem Relation Name Age of Onset    Diabetes Mother Stacia     Hearing loss Mother Stacia     Stroke Mother Stacia     Cancer Father Clifford         bladder ca    Dementia Father Clifford     " Depression Father Clifford     Learning disabilities Sister Marlys        Social History     Socioeconomic History    Marital status:    Tobacco Use    Smoking status: Former     Current packs/day: 0.00     Average packs/day: 1 pack/day for 15.0 years (15.0 ttl pk-yrs)     Types: Cigarettes     Start date:      Quit date:      Years since quittin.3     Passive exposure: Current    Smokeless tobacco: Never   Substance and Sexual Activity    Alcohol use: Never    Drug use: Never    Sexual activity: Yes     Partners: Female     Birth control/protection: None     Social Determinants of Health     Financial Resource Strain: Low Risk  (2024)    Overall Financial Resource Strain (CARDIA)     Difficulty of Paying Living Expenses: Not hard at all   Food Insecurity: No Food Insecurity (2024)    Hunger Vital Sign     Worried About Running Out of Food in the Last Year: Never true     Ran Out of Food in the Last Year: Never true   Transportation Needs: Unknown (2024)    PRAPARE - Transportation     Lack of Transportation (Non-Medical): No   Physical Activity: Insufficiently Active (2024)    Exercise Vital Sign     Days of Exercise per Week: 3 days     Minutes of Exercise per Session: 20 min   Stress: No Stress Concern Present (2024)    [employer] [employer] - Occupational Stress Questionnaire     Feeling of Stress : Not at all   Housing Stability: Low Risk  (2024)    Housing Stability Vital Sign     Unable to Pay for Housing in the Last Year: No     Number of Places Lived in the Last Year: 1     Unstable Housing in the Last Year: No       Review of patient's allergies indicates:  No Known Allergies    Current Outpatient Medications   Medication Instructions    acetaminophen (TYLENOL) 325 mg, Oral, Every 6 hours PRN    blood-glucose meter kit Other, Use as instructed <BR> Accu-check guide<BR>Accu-check fastclix lancets    dulaglutide (TRULICITY) 1.5 mg/0.5 mL pen  "injector Subcutaneous, Every 7 days    GEMTESA 75 mg Tab 1 tablet, Oral, Daily    JARDIANCE 25 mg, Oral, Daily    lubiprostone (AMITIZA) 24 mcg, Oral, 2 times daily with meals    rosuvastatin (CRESTOR) 20 mg, Oral, Nightly    triamcinolone acetonide 0.1% (KENALOG) 0.1 % cream Topical (Top), 2 times daily, Apply to red itchy rash on leg. Do not use on face.       Objective:      Exam:   Visit Vitals  /70 (BP Location: Left arm, Patient Position: Sitting)   Ht 5' 8" (1.727 m)   Wt 69.9 kg (154 lb)   BMI 23.42 kg/m²       Physical Exam  Vitals reviewed.   Constitutional:       Appearance: Parkinsonian; decreased eye blink and mask face;      bradykinetic     Accompanied by: wife  HENT:      Ears:      Comments: [place]; requires hearing aids but did not have today  Eyes:      Extraocular Movements: Extraocular movements intact. No       vertical/horizontal deficits     VF's ok  Cardiovascular:      Rate and Rhythm: Normal rate and regular rhythm.   Pulmonary:      Effort: Pulmonary effort is normal.      Breath sounds: Normal breath sounds.   Musculoskeletal:         General: Normal range of motion.   Skin:     General: Skin is warm and dry.   Neurological:      General: No focal deficit present.      Mental Status:     Speech: dysarthric at times     Face: symmetric; tongue midline     Motor: nonlateralizing; slow to raise from the chair     F to N bradykinetic     R wrist cogwheel rigidity with potentiation     Impaired Heather R>L     Tremor: none today      Gait: Unassisted; bradykinetic, short stride, no arm swing,      turn en bloc, no tremor  Psychiatric:         Mood and Affect: Mood normal.         Behavior: Behavior normal.         Assessment/Plan:   1. Parkinson's disease without dyskinesia or fluctuating manifestations  2. ADHD  3. Anxiety  4. RBD    Continue the CD/LD     Add back the Wellbutrin  mg but take in the morning not at night    Start Melatonin 5 mg tab, 0.5 tab at bed time; ok to increase " to 10 mg max nightly dose    Reminded patient/family about potential PD med side effects, which include but not limited to impulse control disorders (ex: pathologic gambling, shopping, or preoccupation with sexual thoughts or behaviors), excessive daytime sleepiness, hallucinations and sleep attacks. Discussed that driving may pose an increased risk to patients on these medications. Hypotension is also occasionally associated with Parkinson's medications. Any of these complications should be reported to the prescribing physician or provider so that appropriate further modifications can occur.    Fall precautions discussed    He does not drive     Follow up in about 3 months (around 8/6/2024), or if symptoms worsen or fail to improve, for Parkinson's Disease.      Saul Herrera, MSN, APRN, AGACNP-BC

## 2024-05-07 ENCOUNTER — TELEPHONE (OUTPATIENT)
Dept: PRIMARY CARE CLINIC | Facility: CLINIC | Age: 79
End: 2024-05-07
Payer: MEDICARE

## 2024-05-07 NOTE — TELEPHONE ENCOUNTER
1.Are there any outstanding task in patient chart? n        2. Do we have outstanding/pending referrals? N         3. Has the patient been seen in an ER, Urgent Care, or admitted since last visit? n        4. Has patient seen any other healthcare providers since last visit? Y, Dr. Gar, Dr. Herrera        5. Has patient had any blood work or xrays done since last visit? n     6. Is the patient's pneumonia vaccine current? N/a  Prevnar 13:  Pneumonia 20:  Pneumonia 23:     7. When was the patient's colonoscopy? 1/5/23     8. When was the patient's last mamogram? N/a     9. When was the patient's last cervical screening/PAP smear? N/a     10. When was the patient's last bone scan? N/a     11. When was the patient's last diabetic screening?  A1c: 2/12/24  Micro: 11/9/23  Eye Exam: 4/13/22

## 2024-05-08 ENCOUNTER — TELEPHONE (OUTPATIENT)
Dept: NEUROLOGY | Facility: CLINIC | Age: 79
End: 2024-05-08
Payer: MEDICARE

## 2024-05-08 NOTE — TELEPHONE ENCOUNTER
Called and spoke with patient's wife, Ria, to discuss medication access. Notified her that the [facility] does have her 's trulicity strength in stock (patient had mentioned during recent visit having issues getting it). Told her to either call our retail pharmacy to get Rx transferred, or request new script from primary provider. Voiced understanding.       Brad Alfredo, PharmD  Clinical Pharmacist - Neurology  [facility]   O: [phone]

## 2024-05-09 ENCOUNTER — PATIENT MESSAGE (OUTPATIENT)
Dept: NEUROLOGY | Facility: CLINIC | Age: 79
End: 2024-05-09
Payer: MEDICARE

## 2024-05-13 ENCOUNTER — OFFICE VISIT (OUTPATIENT)
Dept: PRIMARY CARE CLINIC | Facility: CLINIC | Age: 79
End: 2024-05-13
Payer: MEDICARE

## 2024-05-13 VITALS
TEMPERATURE: 98 F | DIASTOLIC BLOOD PRESSURE: 79 MMHG | HEIGHT: 68 IN | HEART RATE: 89 BPM | OXYGEN SATURATION: 98 % | RESPIRATION RATE: 18 BRPM | WEIGHT: 154 LBS | SYSTOLIC BLOOD PRESSURE: 117 MMHG | BODY MASS INDEX: 23.34 KG/M2

## 2024-05-13 DIAGNOSIS — E11.65 TYPE 2 DIABETES MELLITUS WITH HYPERGLYCEMIA, WITHOUT LONG-TERM CURRENT USE OF INSULIN: Primary | ICD-10-CM

## 2024-05-13 DIAGNOSIS — F33.1 MODERATE EPISODE OF RECURRENT MAJOR DEPRESSIVE DISORDER: ICD-10-CM

## 2024-05-13 DIAGNOSIS — G20.C PARKINSONISM, UNSPECIFIED PARKINSONISM TYPE: ICD-10-CM

## 2024-05-13 DIAGNOSIS — R41.89 COGNITIVE IMPAIRMENT: ICD-10-CM

## 2024-05-13 DIAGNOSIS — E11.9 TYPE 2 DIABETES MELLITUS WITHOUT COMPLICATION, WITHOUT LONG-TERM CURRENT USE OF INSULIN: Primary | ICD-10-CM

## 2024-05-13 LAB — HBA1C MFR BLD: 8.4 %

## 2024-05-13 PROCEDURE — 99214 OFFICE O/P EST MOD 30 MIN: CPT | Mod: ,,, | Performed by: STUDENT IN AN ORGANIZED HEALTH CARE EDUCATION/TRAINING PROGRAM

## 2024-05-13 PROCEDURE — 83036 HEMOGLOBIN GLYCOSYLATED A1C: CPT | Mod: QW,,, | Performed by: STUDENT IN AN ORGANIZED HEALTH CARE EDUCATION/TRAINING PROGRAM

## 2024-05-13 RX ORDER — BUPROPION HYDROCHLORIDE 300 MG/1
300 TABLET ORAL DAILY
Qty: 30 TABLET | Refills: 11 | Status: SHIPPED | OUTPATIENT
Start: 2024-05-13

## 2024-05-13 RX ORDER — DULAGLUTIDE 1.5 MG/.5ML
1.5 INJECTION, SOLUTION SUBCUTANEOUS
Qty: 4 PEN | Refills: 11 | Status: SHIPPED | OUTPATIENT
Start: 2024-05-13

## 2024-05-13 NOTE — ASSESSMENT & PLAN NOTE
Cognitive impairment progressing. Medication management becoming more difficult. Requesting Home Health eval/treat. Sending referral to  that associated Palliative and Hospice program as these services will eventually be needed for his progressive neurologic condition.

## 2024-05-13 NOTE — ASSESSMENT & PLAN NOTE
POC A1c 8.4% today  Pt's wife requesting Trulicity sent to [facility] pharmacy because they reportedly have it in stock. Rx sent.  Continue Jardiance 25 mg daily.  Continue f/u with Endocrine

## 2024-05-13 NOTE — ASSESSMENT & PLAN NOTE
Still with low energy and excessive sleeping.  Increase Wellbutrin to 300 mg daily. Did  patient and wife on potential for CNS/Psych side effects including agitation, confusion, etc. Wife will monitor patient closely.

## 2024-05-13 NOTE — PROGRESS NOTES
"Subjective:       Patient ID: Clifford Warren is a 79 y.o. male.    -------------------------------------    ADHD (attention deficit hyperactivity disorder)    Cancer    skin cancer    Depression    Diabetes mellitus, type 2    [place] (hard of hearing)    Loss of equilibrium    Parkinson's disease    S/P epidural steroid injection    Sleep apnea, unspecified    "not using Cpap"    Smoker        Chief Complaint: Follow-up, Diabetes, and Constipation    DM2 - off Trulicity >2 months sec to shortage. POC A1c 8.4% today.     Neuro recommended melatonin. Pt's wife hesitant stating he already wakes up "groggy" and asks about safety. Counseled that melatonin should be a safe option.      Review of Systems   Constitutional:  Negative for chills and fever.   HENT:  Negative for sinus pressure/congestion and sore throat.    Respiratory:  Negative for cough, shortness of breath and wheezing.    Cardiovascular:  Negative for chest pain and leg swelling.   Gastrointestinal:  Positive for constipation. Negative for abdominal pain, nausea and vomiting.   Genitourinary:  Negative for dysuria and hematuria.   Musculoskeletal:  Positive for gait problem. Negative for arthralgias and myalgias.   Integumentary:  Negative for rash.   Neurological:  Negative for dizziness and syncope.   Hematological:  Negative for adenopathy.   Psychiatric/Behavioral:  Positive for confusion and dysphoric mood.            Objective:      Physical Exam  Vitals and nursing note reviewed.   Constitutional:       General: He is not in acute distress.  HENT:      Head: Normocephalic.      Nose: No rhinorrhea.   Eyes:      Conjunctiva/sclera: Conjunctivae normal.      Pupils: Pupils are equal, round, and reactive to light.   Cardiovascular:      Rate and Rhythm: Normal rate and regular rhythm.   Pulmonary:      Effort: Pulmonary effort is normal.      Breath sounds: Normal breath sounds.   Abdominal:      Palpations: Abdomen is soft.      Tenderness: " There is no abdominal tenderness.   Musculoskeletal:         General: No deformity or signs of injury.   Skin:     General: Skin is warm and dry.   Neurological:      General: No focal deficit present.      Mental Status: He is alert.      Gait: Gait abnormal (unsteady).      Comments: Below baseline today, facial expressions more blunted, confusion worse than previous visits, slightly agitated today   Psychiatric:         Mood and Affect: Mood normal.         Behavior: Behavior normal.      Comments: Memory impaired           Assessment & Plan:   1. Type 2 diabetes mellitus with hyperglycemia, without long-term current use of insulin  Assessment & Plan:  POC A1c 8.4% today  Pt's wife requesting Trulicity sent to [facility] pharmacy because they reportedly have it in stock. Rx sent.  Continue Jardiance 25 mg daily.  Continue f/u with Endocrine    Orders:  -     dulaglutide (TRULICITY) 1.5 mg/0.5 mL pen injector; Inject 1.5 mg into the skin every 7 days.  Dispense: 4 Pen; Refill: 11    2. Moderate episode of recurrent major depressive disorder  Assessment & Plan:  Still with low energy and excessive sleeping.  Increase Wellbutrin to 300 mg daily. Did  patient and wife on potential for CNS/Psych side effects including agitation, confusion, etc. Wife will monitor patient closely.     Orders:  -     buPROPion (WELLBUTRIN XL) 300 MG 24 hr tablet; Take 1 tablet (300 mg total) by mouth once daily.  Dispense: 30 tablet; Refill: 11    3. Parkinsonism, unspecified Parkinsonism type  Assessment & Plan:  Cognitive impairment progressing. Medication management becoming more difficult. Requesting Home Health eval/treat. Sending referral to  that associated Palliative and Hospice program as these services will eventually be needed for his progressive neurologic condition.     Orders:  -     Ambulatory referral/consult to Home Health    4. Cognitive impairment  -     Ambulatory referral/consult to Home Health      Follow up in  about 3 months (around 8/13/2024) for Diabetes, Depression. In addition to their scheduled follow up, the patient has also been instructed to follow up on as needed basis.

## 2024-05-20 ENCOUNTER — TELEPHONE (OUTPATIENT)
Dept: PRIMARY CARE CLINIC | Facility: CLINIC | Age: 79
End: 2024-05-20
Payer: MEDICARE

## 2024-05-20 NOTE — TELEPHONE ENCOUNTER
Pt wife called to report pt having severe constipation. States she was unsure of the last time pt had a BM. States she gave him 2 fleet enemas with little to no result. Reports pt is having stomach cramps. Advised to take pt to ED for further evaluation. Verbalized understanding.

## 2024-05-21 PROCEDURE — G0180 MD CERTIFICATION HHA PATIENT: HCPCS | Mod: ,,, | Performed by: STUDENT IN AN ORGANIZED HEALTH CARE EDUCATION/TRAINING PROGRAM

## 2024-05-22 ENCOUNTER — TELEPHONE (OUTPATIENT)
Dept: PRIMARY CARE CLINIC | Facility: CLINIC | Age: 79
End: 2024-05-22
Payer: MEDICARE
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
## 2024-05-22 NOTE — LETTER
AUTHORIZATION FOR RELEASE OF   CONFIDENTIAL INFORMATION    Dear [facility],    We are seeing Clifford Warren, date of birth 1945, in the clinic at [facility]. Edu Morrow MD is the patient's PCP. Clifford Warren has an outstanding lab/procedure at the time we reviewed his chart. In order to help keep his health information updated, he has authorized us to request the following medical record(s):        (  )  MAMMOGRAM                                      (  )  COLONOSCOPY      (  )  PAP SMEAR                                          (  )  OUTSIDE LAB RESULTS     (  )  DEXA SCAN                                          (  )  EYE EXAM            (  )  FOOT EXAM                                          (  )  ENTIRE RECORD     (  )  OUTSIDE IMMUNIZATIONS                 ( x )  office note       Please fax records to [facility], [place], Jeffrey D, MD, [phone]     If you have any questions, please contact  at [phone].           Patient Name: Clifford Warren  : 1945  Patient Phone #: [phone]

## 2024-05-28 ENCOUNTER — TELEPHONE (OUTPATIENT)
Dept: PRIMARY CARE CLINIC | Facility: CLINIC | Age: 79
End: 2024-05-28
Payer: MEDICARE

## 2024-06-10 ENCOUNTER — EXTERNAL HOME HEALTH (OUTPATIENT)
Dept: HOME HEALTH SERVICES | Facility: HOSPITAL | Age: 79
End: 2024-06-10
Payer: MEDICARE

## 2024-06-28 ENCOUNTER — DOCUMENT SCAN (OUTPATIENT)
Dept: HOME HEALTH SERVICES | Facility: HOSPITAL | Age: 79
End: 2024-06-28
Payer: MEDICARE

## 2024-07-20 PROCEDURE — G0179 MD RECERTIFICATION HHA PT: HCPCS | Mod: ,,, | Performed by: STUDENT IN AN ORGANIZED HEALTH CARE EDUCATION/TRAINING PROGRAM

## 2024-08-06 ENCOUNTER — TELEPHONE (OUTPATIENT)
Dept: PRIMARY CARE CLINIC | Facility: CLINIC | Age: 79
End: 2024-08-06
Payer: MEDICARE

## 2024-08-11 DIAGNOSIS — K59.09 CHRONIC CONSTIPATION: ICD-10-CM

## 2024-08-12 DIAGNOSIS — K59.09 CHRONIC CONSTIPATION: ICD-10-CM

## 2024-08-12 RX ORDER — LUBIPROSTONE 24 UG/1
24 CAPSULE ORAL 2 TIMES DAILY WITH MEALS
Qty: 60 CAPSULE | Refills: 11 | Status: SHIPPED | OUTPATIENT
Start: 2024-08-12

## 2024-08-12 RX ORDER — LUBIPROSTONE 24 UG/1
CAPSULE ORAL
Qty: 60 CAPSULE | Refills: 11 | OUTPATIENT
Start: 2024-08-12

## 2024-08-13 ENCOUNTER — OFFICE VISIT (OUTPATIENT)
Dept: PRIMARY CARE CLINIC | Facility: CLINIC | Age: 79
End: 2024-08-13
Payer: MEDICARE

## 2024-08-13 VITALS
BODY MASS INDEX: 22.96 KG/M2 | OXYGEN SATURATION: 95 % | HEART RATE: 89 BPM | DIASTOLIC BLOOD PRESSURE: 74 MMHG | WEIGHT: 151 LBS | TEMPERATURE: 98 F | SYSTOLIC BLOOD PRESSURE: 135 MMHG

## 2024-08-13 DIAGNOSIS — F33.1 MODERATE EPISODE OF RECURRENT MAJOR DEPRESSIVE DISORDER: Primary | ICD-10-CM

## 2024-08-13 DIAGNOSIS — E11.9 TYPE 2 DIABETES MELLITUS WITHOUT COMPLICATION, WITHOUT LONG-TERM CURRENT USE OF INSULIN: ICD-10-CM

## 2024-08-13 LAB — HBA1C MFR BLD: 7.3 %

## 2024-08-13 PROCEDURE — 99214 OFFICE O/P EST MOD 30 MIN: CPT | Mod: ,,, | Performed by: STUDENT IN AN ORGANIZED HEALTH CARE EDUCATION/TRAINING PROGRAM

## 2024-08-13 PROCEDURE — 83036 HEMOGLOBIN GLYCOSYLATED A1C: CPT | Mod: QW,,, | Performed by: STUDENT IN AN ORGANIZED HEALTH CARE EDUCATION/TRAINING PROGRAM

## 2024-08-13 RX ORDER — BUPROPION HYDROCHLORIDE 150 MG/1
150 TABLET, EXTENDED RELEASE ORAL DAILY
Qty: 30 TABLET | Refills: 11 | Status: SHIPPED | OUTPATIENT
Start: 2024-08-13 | End: 2025-08-13

## 2024-08-13 NOTE — ASSESSMENT & PLAN NOTE
Chronic problem with progression and side effect of treatment.    Now with insomnia probably side effect of Wellbutrin  mg. Switch to Wellbutrin  mg daily - will take in morning only so hopefully sleep issues improve.

## 2024-08-13 NOTE — PROGRESS NOTES
"Subjective:       Patient ID: Clifford Warren is a 79 y.o. male.    -------------------------------------    ADHD (attention deficit hyperactivity disorder)    Cancer    skin cancer    Depression    Diabetes mellitus, type 2    [place] (hard of hearing)    Loss of equilibrium    Parkinson's disease    S/P epidural steroid injection    Sleep apnea, unspecified    "not using Cpap"    Smoker        Chief Complaint: Diabetes, Depression, Leg Pain (Leg weakness when standing up), and Medication Problem (Wellbutrin 300mg does not get any sleep with the dosage )    C/o difficulty sleeping. Was not an issue until we increased Wellbutrin to 300 mg.    C/o leg weakness - chronic issue, stable, likely related to Parkinson's and lumbar disease. He's in PT.       Review of Systems   Constitutional:  Negative for chills and fever.   HENT:  Negative for sinus pressure/congestion and sore throat.    Respiratory:  Negative for cough, shortness of breath and wheezing.    Cardiovascular:  Negative for chest pain and leg swelling.   Gastrointestinal:  Negative for abdominal pain, nausea and vomiting.   Genitourinary:  Negative for dysuria and hematuria.   Musculoskeletal:  Positive for gait problem. Negative for arthralgias and myalgias.   Integumentary:  Negative for rash.   Neurological:  Negative for dizziness and syncope.   Hematological:  Negative for adenopathy.   Psychiatric/Behavioral:  Positive for confusion, dysphoric mood and sleep disturbance.            Objective:      Physical Exam  Vitals and nursing note reviewed.   Constitutional:       General: He is not in acute distress.  HENT:      Head: Normocephalic.      Nose: No rhinorrhea.   Eyes:      Conjunctiva/sclera: Conjunctivae normal.      Pupils: Pupils are equal, round, and reactive to light.   Cardiovascular:      Rate and Rhythm: Normal rate and regular rhythm.   Pulmonary:      Effort: Pulmonary effort is normal.      Breath sounds: Normal breath sounds. "   Abdominal:      Palpations: Abdomen is soft.      Tenderness: There is no abdominal tenderness.   Musculoskeletal:         General: No deformity or signs of injury.   Skin:     General: Skin is warm and dry.   Neurological:      General: No focal deficit present.      Mental Status: He is alert. Mental status is at baseline.      Gait: Gait abnormal (unsteady).   Psychiatric:         Mood and Affect: Mood normal.         Behavior: Behavior normal.      Comments: Memory impaired           Assessment & Plan:   1. Moderate episode of recurrent major depressive disorder  Assessment & Plan:  Chronic problem with progression and side effect of treatment.    Now with insomnia probably side effect of Wellbutrin  mg. Switch to Wellbutrin  mg daily - will take in morning only so hopefully sleep issues improve.    Orders:  -     buPROPion (WELLBUTRIN SR) 150 MG TBSR 12 hr tablet; Take 1 tablet (150 mg total) by mouth once daily.  Dispense: 30 tablet; Refill: 11    2. Type 2 diabetes mellitus without complication, without long-term current use of insulin  Assessment & Plan:  A1c improved to 7.3% (was 8.4%)  Continue Trulicity 1.5 mg weekly and Jardiance 25 mg daily    Orders:  -     POCT HEMOGLOBIN A1C        Follow up in about 3 months (around 11/13/2024) for Wellness. In addition to their scheduled follow up, the patient has also been instructed to follow up on as needed basis.

## 2024-08-16 ENCOUNTER — EXTERNAL HOME HEALTH (OUTPATIENT)
Dept: HOME HEALTH SERVICES | Facility: HOSPITAL | Age: 79
End: 2024-08-16
Payer: MEDICARE

## 2024-09-09 ENCOUNTER — OFFICE VISIT (OUTPATIENT)
Dept: NEUROLOGY | Facility: CLINIC | Age: 79
End: 2024-09-09
Payer: MEDICARE

## 2024-09-09 VITALS
WEIGHT: 152 LBS | DIASTOLIC BLOOD PRESSURE: 78 MMHG | HEIGHT: 68 IN | SYSTOLIC BLOOD PRESSURE: 122 MMHG | BODY MASS INDEX: 23.04 KG/M2

## 2024-09-09 DIAGNOSIS — G20.A1 PARKINSON'S DISEASE WITHOUT DYSKINESIA OR FLUCTUATING MANIFESTATIONS: Primary | ICD-10-CM

## 2024-09-09 DIAGNOSIS — K11.7 SIALORRHEA: ICD-10-CM

## 2024-09-09 DIAGNOSIS — G47.52 RBD (REM BEHAVIORAL DISORDER): ICD-10-CM

## 2024-09-09 PROCEDURE — 99999 PR PBB SHADOW E&M-EST. PATIENT-LVL III: CPT | Mod: PBBFAC,,, | Performed by: NURSE PRACTITIONER

## 2024-09-09 PROCEDURE — 99213 OFFICE O/P EST LOW 20 MIN: CPT | Mod: PBBFAC | Performed by: NURSE PRACTITIONER

## 2024-09-09 PROCEDURE — 99214 OFFICE O/P EST MOD 30 MIN: CPT | Mod: S$PBB,,, | Performed by: NURSE PRACTITIONER

## 2024-09-09 NOTE — PROGRESS NOTES
"  Neurology Follow up Note    Subjective:         Patient ID: Clifford Warren is a 79 y.o. male.    Chief Complaint: 3 month PD f/u    HPI:            Pt reports tremors well controlled w CD/LD  mg (1 tab QID).     Some worsening balance over last few days; fall yesterday while standing in the shower - fell backwards.    Sleeps for 7 hrs or so and awakens early in morning; taking Melatonin 5 mg nightly, still "all over the place" during night, wife usually moves to another bed.     Some worsening drooling - drools constantly during the day; has to carry a tissue at all times.    Currently in [place] steady boxing which is helpful.     Episodes of double vision at times [has DM as well as PD], needs to revisit w optho    Increase in constipation as of late.     ROS: as per HPI, otherwise pertinent systems review is negative          Past Medical History:   Diagnosis Date    ADHD (attention deficit hyperactivity disorder)     Cancer     skin cancer    Depression     Diabetes mellitus, type 2     [place] (hard of hearing)     Loss of equilibrium     Parkinson's disease     S/P epidural steroid injection     Sleep apnea, unspecified     "not using Cpap"    Smoker        Past Surgical History:   Procedure Laterality Date    CATARACT EXTRACTION, BILATERAL  2021    CHOLECYSTECTOMY  2005    COLONOSCOPY      exc growth eyelid      HERNIA REPAIR      INJECTION OF ANESTHETIC AGENT AROUND MEDIAL BRANCH NERVES INNERVATING LUMBAR FACET JOINT Left 09/13/2023    Procedure: Block-nerve-medial branch-lumbar;  Surgeon: Diana Turcios MD;  Location: [facility] OR;  Service: Pain Management;  Laterality: Left;  Lt L3, L4 L5    INJECTION OF ANESTHETIC AGENT AROUND MEDIAL BRANCH NERVES INNERVATING LUMBAR FACET JOINT Left 11/1/2023    Procedure: Block-nerve-medial branch-lumbar;  Surgeon: Diana Turcios MD;  Location: [facility] OR;  Service: Pain Management;  Laterality: Left;  Left MBB L3, L4, L5    INJECTION OF STEROID Left " 2023    left shoulder steroid injection-Dr Hou    TONSILLECTOMY  1950       Family History   Problem Relation Name Age of Onset    Diabetes Mother Stacia     Hearing loss Mother Stacia     Stroke Mother Stacia     Cancer Father [place]         bladder ca    Dementia Father [place]     Depression Father Clifford     Learning disabilities Sister Marlys        Social History     Socioeconomic History    Marital status:    Tobacco Use    Smoking status: Former     Current packs/day: 0.00     Average packs/day: 1 pack/day for 15.0 years (15.0 ttl pk-yrs)     Types: Cigarettes     Start date:      Quit date:      Years since quittin.6     Passive exposure: Current    Smokeless tobacco: Never   Substance and Sexual Activity    Alcohol use: Never    Drug use: Never    Sexual activity: Yes     Partners: Female     Birth control/protection: None     Social Determinants of Health     Financial Resource Strain: Low Risk  (2024)    Overall Financial Resource Strain (CARDIA)     Difficulty of Paying Living Expenses: Not hard at all   Food Insecurity: No Food Insecurity (2024)    Hunger Vital Sign     Worried About Running Out of Food in the Last Year: Never true     Ran Out of Food in the Last Year: Never true   Transportation Needs: No Transportation Needs (2024)    TRANSPORTATION NEEDS     Transportation : No   Physical Activity: Inactive (2024)    Exercise Vital Sign     Days of Exercise per Week: 0 days     Minutes of Exercise per Session: 0 min   Stress: No Stress Concern Present (2024)    [employer] [place] [employer] - Occupational Stress Questionnaire     Feeling of Stress : Not at all   Housing Stability: Low Risk  (2024)    Housing Stability Vital Sign     Unable to Pay for Housing in the Last Year: No     Homeless in the Last Year: No       Review of patient's allergies indicates:  No Known Allergies    Current Outpatient Medications   Medication Instructions     "acetaminophen (TYLENOL) 325 mg, Oral, Every 6 hours PRN    blood-glucose meter kit Other, Use as instructed <BR> Accu-check guide<BR>Accu-check fastclix lancets    carbidopa-levodopa  mg (SINEMET)  mg per tablet 1 tablet, Oral, 4 times daily    GEMTESA 75 mg Tab 1 tablet, Oral, Daily    JARDIANCE 25 mg, Oral, Daily    lubiprostone (AMITIZA) 24 mcg, Oral, 2 times daily with meals    rosuvastatin (CRESTOR) 20 mg, Oral, Nightly    triamcinolone acetonide 0.1% (KENALOG) 0.1 % cream Topical (Top), 2 times daily, Apply to red itchy rash on leg. Do not use on face.    TRULICITY 1.5 mg, Subcutaneous, Every 7 days       Objective:      Exam:   Visit Vitals  /78 (BP Location: Left arm, Patient Position: Sitting)   Ht 5' 8" (1.727 m)   Wt 68.9 kg (152 lb)   BMI 23.11 kg/m²       Physical Exam  Vitals reviewed.   Constitutional:       Appearance: Parkinsonian; decreased eye blink and mask face;      bradykinetic     Accompanied by: wife  HENT:      Ears:      Comments: [place]; requires hearing aids but did not have today  Eyes:      Extraocular Movements: Extraocular movements intact. No       vertical/horizontal deficits     VF's ok     PERRLA but small  Cardiovascular:      Rate and Rhythm: Normal rate and regular rhythm.   Pulmonary:      Effort: Pulmonary effort is normal.      Breath sounds: Normal breath sounds.   Musculoskeletal:         General: Normal range of motion.   Skin:     General: Skin is warm and dry.   Neurological:      General: No focal deficit present.      Mental Status:     Speech: dysarthric at times     Face: symmetric; tongue midline     Motor: nonlateralizing; slow to raise from the chair     F to N bradykinetic     R wrist cogwheel rigidity with potentiation     Impaired Heather R>L     Tremor: none today      Gait: Unassisted; bradykinetic, short stride, no arm swing,      turn en bloc, no tremor  Psychiatric:         Mood and Affect: Mood normal.         Behavior: Behavior normal.      " "   Assessment/Plan:   Parkinson's disease  Sialorrhea  Constipation  RBD    Continue the CD/LD 1 tab QID    Reminded patient/family about potential PD med side effects, which include but not limited to impulse control disorders (ex: pathologic gambling, shopping, or preoccupation with sexual thoughts or behaviors), excessive daytime sleepiness, hallucinations and sleep attacks. Discussed that driving may pose an increased risk to patients on these medications. Hypotension is also occasionally associated with Parkinson's medications. Any of these complications should be reported to the prescribing physician or provider so that appropriate further modifications can occur.    Fall precautions discussed; he is at [employer]; he has grab bars in the shower and a shower chair.     He does not drive     Safety measures discussed     Sialorrhea ["constant/daily"]; however, ongoing constipation [held off on Artane as a result of the constipation] - will start approval process for Xeomin - my colleague Dr. Sykes can administer.     Add Colace 100 mg at bed time    Take Melatonin 5 mg every night [up to max nightly dose of 10 mg], in lieu of PRN, for the RBD - may need Clonazepam    Adry - ophtha    Follow up in about 3 months (around 12/9/2024), or if symptoms worsen or fail to improve, for Parkinson's Disease.      Saul Herrera, MSN, APRN, AGACNP-BC        "

## 2024-09-15 NOTE — PROGRESS NOTES
"  Neurology Note - Initial Encounter    Subjective:         Patient ID: Clifford Warren is a 78 y.o. male.    Chief Complaint: NP ref by Dr Kurtis Morrow for neuro cons to eval for Memory-Balance    HPI:            Over the past 2 years, notable decline in cognition and gait  .  Worsening balance despite participating in PT  .  Has become very slow in his movement; increase rigidity in hands and legs  .  Daily bren[place] horses in his legs  .  Swallowing difficulty, choking, worsening drooling  .  Difficulty with recall  .  ASHLEY - intolerant to PAP  .  Prior EMG 2021 reportedly ok  .  Prior LESI for chr lumbago  .  Urinary incontinence  .  He does not drive     MRI brain 11/2022 - per rad: Mild to moderate global age appropriate cerebral atrophy and mild chronic microangiopathic change involving the frontal parietal lobe deep cerebral white matter and central abel.     Small 4 mm chronic lacunar infarct at the left anterior caudate body without additional infarct.     No hydrocephalus.  No acute intracranial pathology identified.     This is what he shared with our nurse:  Pt states he gets dizzy, of balance w falls for abt 2-3 yrs. States he saw an ENT and was told it was his crystals and he did therapy and it did not help. Pt states he forgets names, place and numbers for abt 3-4 yrs and it is getting worse. States he use to pay the bills and now he can't. Pt does not drive and lives at home with his wife.     ROS: as per HPI, otherwise pertinent systems review is negative; denies fam h/o neurological problems          Past Medical History:   Diagnosis Date    ADHD (attention deficit hyperactivity disorder)     Depression     Diabetes mellitus, type 2     [place] (hard of hearing)     Loss of equilibrium     S/P epidural steroid injection     Sleep apnea, unspecified     "not using Cpap"    Smoker        Past Surgical History:   Procedure Laterality Date    CATARACT EXTRACTION, BILATERAL  2021    " "CHOLECYSTECTOMY  2005    COLONOSCOPY      EYE SURGERY      HERNIA REPAIR      TONSILLECTOMY  1950       Family History   Problem Relation Age of Onset    Diabetes Mother     Hearing loss Mother     Stroke Mother     Cancer Father         bladder ca    Dementia Father     Depression Father     Learning disabilities Sister        Social History     Socioeconomic History    Marital status:    Tobacco Use    Smoking status: Every Day     Packs/day: 1.00     Years: 15.00     Pack years: 15.00     Types: Cigarettes     Passive exposure: Current    Smokeless tobacco: Never   Substance and Sexual Activity    Alcohol use: Not Currently    Drug use: Never    Sexual activity: Not Currently     Partners: Female     Birth control/protection: None       Review of patient's allergies indicates:  No Known Allergies      Current Outpatient Medications:     acetaminophen (TYLENOL) 325 MG tablet, Take 325 mg by mouth every 6 (six) hours as needed for Pain., Disp: , Rfl:     aspirin 81 MG Chew, 1 tab(s), Disp: , Rfl:     blood-glucose meter kit, by Other route. Use as instructed   Accu-check guide Accu-check fastclix lancets, Disp: , Rfl:     finasteride (PROSCAR) 5 mg tablet, Take 1 tablet (5 mg total) by mouth once daily., Disp: 30 tablet, Rfl: 11    ibuprofen (ADVIL,MOTRIN) 200 MG tablet, Take 200 mg by mouth every 8 (eight) hours as needed for Pain., Disp: , Rfl:     JARDIANCE 25 mg tablet, Take 25 mg by mouth., Disp: , Rfl:     linaGLIPtin (TRADJENTA) 5 mg Tab tablet, Take 1 tablet (5 mg total) by mouth once daily., Disp: 90 tablet, Rfl: 3    rosuvastatin (CRESTOR) 20 MG tablet, Take 1 tablet (20 mg total) by mouth every evening., Disp: 90 tablet, Rfl: 3    tamsulosin (FLOMAX) 0.4 mg Cap, Take 1 capsule (0.4 mg total) by mouth once daily., Disp: 30 capsule, Rfl: 11    SUTAB 1.479-0.188- 0.225 gram tablet, Take by mouth., Disp: , Rfl:      Objective:      Exam:   /82   Ht 5' 8" (1.727 m)   Wt 77.1 kg (170 lb)   BMI " 25.85 kg/m²     Physical Exam  Vitals reviewed.   Constitutional:       Appearance: Parkinsonian; decreased eye blink and mask face;      bradykinetic     Accompanied by: wife  HENT:      Ears:      Comments: [place]; requires hearing aids but did not have today  Eyes:      Extraocular Movements: Extraocular movements intact. No       vertical/horizontal deficits     VF's ok  Cardiovascular:      Rate and Rhythm: Normal rate and regular rhythm.   Pulmonary:      Effort: Pulmonary effort is normal.      Breath sounds: Normal breath sounds.   Musculoskeletal:         General: Normal range of motion.   Skin:     General: Skin is warm and dry.   Neurological:      General: No focal deficit present.      Mental Status: MMSE 23/30     Speech: dysarthric at times     Face: symmetric; tongue midline     Motor: nonlateralizing; slow to raise from the chair     F to N bradykinetic     R wrist cogwheel rigidity with potentiation     Impaired Heather R>L     Reflexes: symmetric, equal B kjs and ajs     Tremor: subtle intermittent R hand rest tremor     Vib nml     No ankle clonus     Gait unassisted, bradykinetic, short stride, no arm swing, turn en bloc with       loss of balance during the turn [reached for wall], no tremor  Psychiatric:         Mood and Affect: Mood normal.         Behavior: Behavior normal.     Micrographia noted on MMSE when he kathe the shapes and wrote his sentence.    Dr. Sykes physically present in exam room during patient encounter.       Assessment/Plan:     Problem List Items Addressed This Visit          Neuro    Parkinsonism  Looks parkinsonian - bradykinetic, intermittent subtle resting R hand tremor, and postural instability/balance difficulty; poss neurodegenerative process but less likely PSP.     No additional orders at this time    I do not suspect NPH; MRI brain images reviewed with Dr. Sykes during visit today.     Follow up in 3 months         Mild cognitive impairment - Primary  No  additional tests requested at this time  No additional meds requested/suggested at this time    He does not drive  He lives with his wife       Other    Neurologic gait dysfunction  PT in the past  Fall risk discussed  Advise he be careful not to fall; consider use of cane    He does not drive  Lives with his wife    Vertigo       Other Visit Diagnoses       Balance problem        Urinary incontinence, unspecified type        Memory problem                  Saul Herrera, MSN, APRN, AGACNP-BC         [facility].  Orthopaedic Surgery  [address], [address]  [place] [address]  Phone: [phone]  Fax: [phone]  Follow Up Time:

## 2024-09-18 ENCOUNTER — DOCUMENT SCAN (OUTPATIENT)
Dept: HOME HEALTH SERVICES | Facility: HOSPITAL | Age: 79
End: 2024-09-18
Payer: MEDICARE

## 2024-10-01 ENCOUNTER — PROCEDURE VISIT (OUTPATIENT)
Dept: NEUROLOGY | Facility: CLINIC | Age: 79
End: 2024-10-01
Payer: MEDICARE

## 2024-10-01 VITALS
WEIGHT: 150 LBS | DIASTOLIC BLOOD PRESSURE: 80 MMHG | SYSTOLIC BLOOD PRESSURE: 138 MMHG | HEIGHT: 68 IN | BODY MASS INDEX: 22.73 KG/M2

## 2024-10-01 DIAGNOSIS — G20.A1 PARKINSON'S DISEASE WITHOUT DYSKINESIA OR FLUCTUATING MANIFESTATIONS: Primary | ICD-10-CM

## 2024-10-01 DIAGNOSIS — K11.7 SIALORRHEA: ICD-10-CM

## 2024-10-01 PROCEDURE — 64611 CHEMODENERV SALIV GLANDS: CPT | Mod: PBBFAC | Performed by: SPECIALIST

## 2024-10-01 NOTE — PROCEDURES
"Established Parkinson's Patient   SUBJECTIVE:  Patient ID: Clifford Warren   79 y.o.     Chief Complaint: Botulinum Toxin Injection    History of Present Illness:  Clifford Warren is a 79 y.o. male who presents to clinic with his wife for xeomin injections.   Last seen by Saul on 9/9. Pt is still taking CD/LD  4 times/day with no significant side effects. Wife reports tremors have improved significantly. Reports excessive drooling throughout the day. Pt states he refuses to use ambulatory devices because " he doesn't believe in them". Also reports needing assistance doing day time activities, but refuses help.     Pt stated in the middle of conversation that he know doesn't know where he is and what he was doing here. Wife with him and reassured him. Pt wife's states that he is just "tired and that he's ready for a nap" that's when he starts getting forgetful. Pt denies vivid dreams and hallucinations. Pt's wife states that she doesn't find pt has a cognitive decline.     Review of Systems: as per HPI, otherwise a balanced 10 systems review is negative.        Past Medical History:   Diagnosis Date    ADHD (attention deficit hyperactivity disorder)     Cancer     skin cancer    Depression     Diabetes mellitus, type 2     [place] (hard of hearing)     Loss of equilibrium     Parkinson's disease     S/P epidural steroid injection     Sleep apnea, unspecified     "not using Cpap"    Smoker        Past Surgical History:   Procedure Laterality Date    CATARACT EXTRACTION, BILATERAL  2021    CHOLECYSTECTOMY  2005    COLONOSCOPY      exc growth eyelid      HERNIA REPAIR      INJECTION OF ANESTHETIC AGENT AROUND MEDIAL BRANCH NERVES INNERVATING LUMBAR FACET JOINT Left 09/13/2023    Procedure: Block-nerve-medial branch-lumbar;  Surgeon: Diana Turcios MD;  Location: [facility];  Service: Pain Management;  Laterality: Left;  Lt L3, L4 L5    INJECTION OF ANESTHETIC AGENT AROUND MEDIAL BRANCH NERVES " INNERVATING LUMBAR FACET JOINT Left 2023    Procedure: Block-nerve-medial branch-lumbar;  Surgeon: Diana Turcios MD;  Location: [facility] OR;  Service: Pain Management;  Laterality: Left;  Left MBB L3, L4, L5    INJECTION OF STEROID Left 2023    left shoulder steroid injection-Dr Hou    TONSILLECTOMY  1950       Family History   Problem Relation Name Age of Onset    Diabetes Mother Stacia     Hearing loss Mother Stacia     Stroke Mother Stacia     Cancer Father Clifford         bladder ca    Dementia Father Clifford     Depression Father [place]     Learning disabilities Sister Marlys        Social History     Socioeconomic History    Marital status:    Tobacco Use    Smoking status: Former     Current packs/day: 0.00     Average packs/day: 1 pack/day for 15.0 years (15.0 ttl pk-yrs)     Types: Cigarettes     Start date:      Quit date:      Years since quittin.7     Passive exposure: Current    Smokeless tobacco: Never   Substance and Sexual Activity    Alcohol use: Never    Drug use: Never    Sexual activity: Yes     Partners: Female     Birth control/protection: None     Review of patient's allergies indicates:  No Known Allergies    Current Medications:  Current Outpatient Medications   Medication Instructions    acetaminophen (TYLENOL) 325 mg, Every 6 hours PRN    blood-glucose meter kit by Other route. Use as instructed    Accu-check guide  Accu-check fastclix lancets    carbidopa-levodopa  mg (SINEMET)  mg per tablet 1 tablet, Oral, 4 times daily    GEMTESA 75 mg Tab 1 tablet, Daily    JARDIANCE 25 mg, Daily    lubiprostone (AMITIZA) 24 mcg, Oral, 2 times daily with meals    rosuvastatin (CRESTOR) 20 mg, Oral, Nightly    triamcinolone acetonide 0.1% (KENALOG) 0.1 % cream Topical (Top), 2 times daily, Apply to red itchy rash on leg. Do not use on face.    TRULICITY 1.5 mg, Subcutaneous, Every 7 days       OBJECTIVE:  Vitals:  /80 (BP Location: Left arm, Patient Position:  "Sitting)   Ht 5' 8" (1.727 m)   Wt 68 kg (150 lb)   BMI 22.81 kg/m²      Physical Exam:  General Exam  Accompanied by - wife  appearance - flat affect; decreased eye blink; bradykinetic  lungs - pulmonary effort normal  skin- normal for ethnicity, no obvious lesions noted    Neurologic Exam  Cortical function - The patient is alert, attentive, and oriented  Speech - hypophonia  Cranial nerves:  CN II - visual fields are full to confrontation.   CN III, IV, VI -  EOM slowed, unable to look up; no horizontal deficits  CN VII - no facial asymmetry  CN VIII - hearing is grossly normal  tremor - none    Review of Data:   Prior notes reviewed     ASSESSMENT /PLAN:    1. Parkinson's disease without dyskinesia or fluctuating manifestations  -possible PSP given his presentation and lack of upward vertical gaze.   -CD/LD  QID working well for him, continue present management.     2. Sialorrhea  -xeomin given by Dr. Sykes, see procedure addendum note       Parkinson's medications can be associated with certain side effects.  Nausea and abdominal symptoms typically improve in time.  Impulse control disorders including persistent thoughts or behaviors involving shopping, gambling, or sex can be problematic.  Excessive daytime sleepiness and sleep attacks can occur; driving may pose an increased risk to patients on these medications.   Delusions, hallucinations, and paranoia can also occur, typically with higher doses in older patients.   Hypotension is also occasionally associated with Parkinson's medications.   Abrupt stoppage of high dose parkinson's medications can be medically troublesome.  Any of the above complications should be reported to the prescribing physician or provider so that appropriate further modifications can occur.     Fall risks / precautions discussed.    Questions and concerns were sought and answered to the patient's stated verbal satisfaction.    The patient verbalizes understanding and " agreement with the above stated treatment plan.   Items discussed include acute and/or chronic neurological, sleep, or other issues and their attendant differential diagnoses.  Potential for additional testing, treatment options, and prognosis also discussed.    Follow-up in January for repeat xeomin injections & PD follow-up     Dr. Sykes physically present in exam room during patient encounter.   I, Consuelo Pina NP acted solely as a scribe for and in the presence of Dr. Sykes who performed the service.    Consuelo Pina, MSN, APRN, FNP-C  [facility]  [phone]

## 2024-10-22 ENCOUNTER — OFFICE VISIT (OUTPATIENT)
Dept: PRIMARY CARE CLINIC | Facility: CLINIC | Age: 79
End: 2024-10-22
Payer: MEDICARE

## 2024-10-22 VITALS
HEIGHT: 68 IN | HEART RATE: 87 BPM | TEMPERATURE: 98 F | WEIGHT: 152 LBS | OXYGEN SATURATION: 98 % | RESPIRATION RATE: 18 BRPM | DIASTOLIC BLOOD PRESSURE: 72 MMHG | SYSTOLIC BLOOD PRESSURE: 128 MMHG | BODY MASS INDEX: 23.04 KG/M2

## 2024-10-22 DIAGNOSIS — J43.2 CENTRILOBULAR EMPHYSEMA: ICD-10-CM

## 2024-10-22 DIAGNOSIS — E55.9 VITAMIN D DEFICIENCY: ICD-10-CM

## 2024-10-22 DIAGNOSIS — E78.2 MIXED HYPERLIPIDEMIA: ICD-10-CM

## 2024-10-22 DIAGNOSIS — E11.9 TYPE 2 DIABETES MELLITUS WITHOUT COMPLICATION, WITHOUT LONG-TERM CURRENT USE OF INSULIN: ICD-10-CM

## 2024-10-22 DIAGNOSIS — M25.511 ACUTE PAIN OF RIGHT SHOULDER: ICD-10-CM

## 2024-10-22 DIAGNOSIS — G20.A1 PARKINSON'S DISEASE, UNSPECIFIED WHETHER DYSKINESIA PRESENT, UNSPECIFIED WHETHER MANIFESTATIONS FLUCTUATE: Primary | ICD-10-CM
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
## 2024-10-22 DIAGNOSIS — Z11.59 NEED FOR HEPATITIS C SCREENING TEST: ICD-10-CM

## 2024-10-22 DIAGNOSIS — S20.211A CONTUSION OF RIGHT CHEST WALL, INITIAL ENCOUNTER: ICD-10-CM

## 2024-10-22 PROCEDURE — 99214 OFFICE O/P EST MOD 30 MIN: CPT | Mod: ,,, | Performed by: STUDENT IN AN ORGANIZED HEALTH CARE EDUCATION/TRAINING PROGRAM

## 2024-10-23 ENCOUNTER — LAB VISIT (OUTPATIENT)
Dept: LAB | Facility: HOSPITAL | Age: 79
End: 2024-10-23
Attending: STUDENT IN AN ORGANIZED HEALTH CARE EDUCATION/TRAINING PROGRAM
Payer: MEDICARE

## 2024-10-23 DIAGNOSIS — E78.2 MIXED HYPERLIPIDEMIA: ICD-10-CM

## 2024-10-23 DIAGNOSIS — Z11.59 NEED FOR HEPATITIS C SCREENING TEST: ICD-10-CM

## 2024-10-23 DIAGNOSIS — E11.9 TYPE 2 DIABETES MELLITUS WITHOUT COMPLICATION, WITHOUT LONG-TERM CURRENT USE OF INSULIN: ICD-10-CM

## 2024-10-23 DIAGNOSIS — E55.9 VITAMIN D DEFICIENCY: ICD-10-CM

## 2024-10-23 LAB
25(OH)D3+25(OH)D2 SERPL-MCNC: 49 NG/ML (ref 30–80)
ALBUMIN SERPL-MCNC: 4.3 G/DL (ref 3.4–4.8)
ALBUMIN/GLOB SERPL: 1.5 RATIO (ref 1.1–2)
ALP SERPL-CCNC: 49 UNIT/L (ref 40–150)
ALT SERPL-CCNC: 33 UNIT/L (ref 0–55)
ANION GAP SERPL CALC-SCNC: 9 MEQ/L
AST SERPL-CCNC: 32 UNIT/L (ref 5–34)
BACTERIA #/AREA URNS AUTO: ABNORMAL /HPF
BASOPHILS # BLD AUTO: 0.03 X10(3)/MCL
BASOPHILS NFR BLD AUTO: 0.6 %
BILIRUB SERPL-MCNC: 0.7 MG/DL
BILIRUB UR QL STRIP.AUTO: NEGATIVE
BUN SERPL-MCNC: 26.4 MG/DL (ref 8.4–25.7)
CALCIUM SERPL-MCNC: 9.9 MG/DL (ref 8.8–10)
CHLORIDE SERPL-SCNC: 105 MMOL/L (ref 98–107)
CHOLEST SERPL-MCNC: 112 MG/DL
CHOLEST/HDLC SERPL: 3 {RATIO} (ref 0–5)
CLARITY UR: CLEAR
CO2 SERPL-SCNC: 26 MMOL/L (ref 23–31)
COLOR UR AUTO: ABNORMAL
CREAT SERPL-MCNC: 0.85 MG/DL (ref 0.72–1.25)
CREAT/UREA NIT SERPL: 31
EOSINOPHIL # BLD AUTO: 0.06 X10(3)/MCL (ref 0–0.9)
EOSINOPHIL NFR BLD AUTO: 1.2 %
ERYTHROCYTE [DISTWIDTH] IN BLOOD BY AUTOMATED COUNT: 13.3 % (ref 11.5–17)
EST. AVERAGE GLUCOSE BLD GHB EST-MCNC: 154.2 MG/DL
GFR SERPLBLD CREATININE-BSD FMLA CKD-EPI: >60 ML/MIN/1.73/M2
GLOBULIN SER-MCNC: 2.9 GM/DL (ref 2.4–3.5)
GLUCOSE SERPL-MCNC: 162 MG/DL (ref 82–115)
GLUCOSE UR QL STRIP: ABNORMAL
HBA1C MFR BLD: 7 %
HCT VFR BLD AUTO: 47.1 % (ref 42–52)
HDLC SERPL-MCNC: 43 MG/DL (ref 35–60)
HGB BLD-MCNC: 15.4 G/DL (ref 14–18)
HGB UR QL STRIP: NEGATIVE
IMM GRANULOCYTES # BLD AUTO: 0.03 X10(3)/MCL (ref 0–0.04)
IMM GRANULOCYTES NFR BLD AUTO: 0.6 %
KETONES UR QL STRIP: ABNORMAL
LDLC SERPL CALC-MCNC: 40 MG/DL (ref 50–140)
LEUKOCYTE ESTERASE UR QL STRIP: NEGATIVE
LYMPHOCYTES # BLD AUTO: 1.15 X10(3)/MCL (ref 0.6–4.6)
LYMPHOCYTES NFR BLD AUTO: 23.2 %
MCH RBC QN AUTO: 31.2 PG (ref 27–31)
MCHC RBC AUTO-ENTMCNC: 32.7 G/DL (ref 33–36)
MCV RBC AUTO: 95.3 FL (ref 80–94)
MONOCYTES # BLD AUTO: 0.51 X10(3)/MCL (ref 0.1–1.3)
MONOCYTES NFR BLD AUTO: 10.3 %
NEUTROPHILS # BLD AUTO: 3.17 X10(3)/MCL (ref 2.1–9.2)
NEUTROPHILS NFR BLD AUTO: 64.1 %
NITRITE UR QL STRIP: NEGATIVE
NRBC BLD AUTO-RTO: 0 %
PH UR STRIP: 5 [PH]
PLATELET # BLD AUTO: 260 X10(3)/MCL (ref 130–400)
PMV BLD AUTO: 9.6 FL (ref 7.4–10.4)
POTASSIUM SERPL-SCNC: 5 MMOL/L (ref 3.5–5.1)
PROT SERPL-MCNC: 7.2 GM/DL (ref 5.8–7.6)
PROT UR QL STRIP: NEGATIVE
RBC # BLD AUTO: 4.94 X10(6)/MCL (ref 4.7–6.1)
RBC #/AREA URNS AUTO: ABNORMAL /HPF
SODIUM SERPL-SCNC: 140 MMOL/L (ref 136–145)
SP GR UR STRIP.AUTO: 1.03 (ref 1–1.03)
SQUAMOUS #/AREA URNS LPF: ABNORMAL /HPF
TRIGL SERPL-MCNC: 143 MG/DL (ref 34–140)
TSH SERPL-ACNC: 1.55 UIU/ML (ref 0.35–4.94)
UROBILINOGEN UR STRIP-ACNC: NORMAL
VLDLC SERPL CALC-MCNC: 29 MG/DL
WBC # BLD AUTO: 4.95 X10(3)/MCL (ref 4.5–11.5)
WBC #/AREA URNS AUTO: ABNORMAL /HPF

## 2024-10-23 PROCEDURE — 82306 VITAMIN D 25 HYDROXY: CPT

## 2024-10-23 PROCEDURE — 36415 COLL VENOUS BLD VENIPUNCTURE: CPT

## 2024-10-23 PROCEDURE — 83036 HEMOGLOBIN GLYCOSYLATED A1C: CPT

## 2024-10-23 PROCEDURE — 86803 HEPATITIS C AB TEST: CPT

## 2024-10-23 PROCEDURE — 85025 COMPLETE CBC W/AUTO DIFF WBC: CPT

## 2024-10-23 PROCEDURE — 84443 ASSAY THYROID STIM HORMONE: CPT

## 2024-10-23 PROCEDURE — 81015 MICROSCOPIC EXAM OF URINE: CPT

## 2024-10-23 PROCEDURE — 81001 URINALYSIS AUTO W/SCOPE: CPT

## 2024-10-23 PROCEDURE — 80061 LIPID PANEL: CPT

## 2024-10-23 PROCEDURE — 80053 COMPREHEN METABOLIC PANEL: CPT

## 2024-10-24 LAB — HCV AB SERPL QL IA: NONREACTIVE

## 2024-11-02 DIAGNOSIS — G20.C PARKINSONISM, UNSPECIFIED PARKINSONISM TYPE: Primary | ICD-10-CM

## 2024-11-04 ENCOUNTER — TELEPHONE (OUTPATIENT)
Dept: PRIMARY CARE CLINIC | Facility: CLINIC | Age: 79
End: 2024-11-04
Payer: MEDICARE

## 2024-11-04 RX ORDER — CARBIDOPA AND LEVODOPA 25; 100 MG/1; MG/1
1 TABLET ORAL 4 TIMES DAILY
Qty: 360 TABLET | Refills: 2 | Status: SHIPPED | OUTPATIENT
Start: 2024-11-04

## 2024-11-05 ENCOUNTER — TELEPHONE (OUTPATIENT)
Dept: PRIMARY CARE CLINIC | Facility: CLINIC | Age: 79
End: 2024-11-05
Payer: MEDICARE

## 2024-11-05 DIAGNOSIS — S46.011D TRAUMATIC COMPLETE TEAR OF RIGHT ROTATOR CUFF, SUBSEQUENT ENCOUNTER: Primary | ICD-10-CM

## 2024-11-05 PROCEDURE — G0180 MD CERTIFICATION HHA PATIENT: HCPCS | Mod: ,,, | Performed by: STUDENT IN AN ORGANIZED HEALTH CARE EDUCATION/TRAINING PROGRAM

## 2024-11-05 NOTE — TELEPHONE ENCOUNTER
Spoke to patient's wife.  Confirmed name and patient's date of birth.      Reviewed MRI shoulder results which revealed two complete rotator cuff tears (supraspinatus and infraspinatus).     Unfortunately, due to his Parkinson's disease, he may not be a good surgical candidate.  Still, we will refer to orthopedics to discuss treatment options, consider injection for pain relief, and make recommendations on sling/brace.  Appreciate Orthopedics assistance with this.    All questions answered.  Patient's wife expressed understanding.    Edu Morrow MD

## 2024-11-18 ENCOUNTER — OFFICE VISIT (OUTPATIENT)
Dept: PRIMARY CARE CLINIC | Facility: CLINIC | Age: 79
End: 2024-11-18
Payer: MEDICARE

## 2024-11-18 VITALS
RESPIRATION RATE: 18 BRPM | WEIGHT: 150 LBS | OXYGEN SATURATION: 98 % | HEART RATE: 86 BPM | HEIGHT: 68 IN | SYSTOLIC BLOOD PRESSURE: 132 MMHG | TEMPERATURE: 98 F | BODY MASS INDEX: 22.73 KG/M2 | DIASTOLIC BLOOD PRESSURE: 75 MMHG

## 2024-11-18 DIAGNOSIS — Z00.00 MEDICARE ANNUAL WELLNESS VISIT, SUBSEQUENT: Primary | ICD-10-CM

## 2024-11-18 DIAGNOSIS — Z23 NEED FOR INFLUENZA VACCINATION: ICD-10-CM

## 2024-11-18 PROCEDURE — G0008 ADMIN INFLUENZA VIRUS VAC: HCPCS | Mod: ,,, | Performed by: STUDENT IN AN ORGANIZED HEALTH CARE EDUCATION/TRAINING PROGRAM

## 2024-11-18 PROCEDURE — G0439 PPPS, SUBSEQ VISIT: HCPCS | Mod: ,,, | Performed by: STUDENT IN AN ORGANIZED HEALTH CARE EDUCATION/TRAINING PROGRAM

## 2024-11-18 PROCEDURE — 90653 IIV ADJUVANT VACCINE IM: CPT | Mod: ,,, | Performed by: STUDENT IN AN ORGANIZED HEALTH CARE EDUCATION/TRAINING PROGRAM

## 2024-11-18 RX ORDER — MIRTAZAPINE 15 MG/1
7.5 TABLET, FILM COATED ORAL NIGHTLY
COMMUNITY
Start: 2024-11-14

## 2024-11-18 NOTE — PROGRESS NOTES
"Patient ID: [id number]     Chief Complaint: No chief complaint on file.    HPI:     Clifford Warren is a 79 y.o. male here today for a Medicare Wellness.     Now following [facility]  (associated with Mary Beth SWEENEY). They started Remeron 7.5 mg nightly (pt hasn't started yet)    Opioid Screening: Patient medication list reviewed, patient is not taking prescription opioids. Patient is not using additional opioids than prescribed. Patient is at low risk of substance abuse based on this opioid use history.       -------------------------------------    ADHD (attention deficit hyperactivity disorder)    Cancer    skin cancer    Depression    Diabetes mellitus, type 2    [place] (hard of hearing)    Loss of equilibrium    Parkinson's disease    S/P epidural steroid injection    Sleep apnea, unspecified    "not using Cpap"    Smoker        Past Surgical History:   Procedure Laterality Date    CATARACT EXTRACTION, BILATERAL  2021    CHOLECYSTECTOMY  2005    COLONOSCOPY      exc growth eyelid      HERNIA REPAIR      INJECTION OF ANESTHETIC AGENT AROUND MEDIAL BRANCH NERVES INNERVATING LUMBAR FACET JOINT Left 09/13/2023    Procedure: Block-nerve-medial branch-lumbar;  Surgeon: Diana Turcios MD;  Location: [facility] OR;  Service: Pain Management;  Laterality: Left;  Lt L3, L4 L5    INJECTION OF ANESTHETIC AGENT AROUND MEDIAL BRANCH NERVES INNERVATING LUMBAR FACET JOINT Left 11/1/2023    Procedure: Block-nerve-medial branch-lumbar;  Surgeon: Diana Turcios MD;  Location: [facility] OR;  Service: Pain Management;  Laterality: Left;  Left MBB L3, L4, L5    INJECTION OF STEROID Left 09/27/2023    left shoulder steroid injection-Dr Hou    TONSILLECTOMY  1950       Review of patient's allergies indicates:  No Known Allergies    Outpatient Medications Marked as Taking for the 11/18/24 encounter (Office Visit) with Edu Morrow MD   Medication Sig Dispense Refill    acetaminophen (TYLENOL) 325 MG tablet " Take 325 mg by mouth every 6 (six) hours as needed for Pain.      blood-glucose meter kit by Other route. Use as instructed    Accu-check guide  Accu-check fastclix lancets      carbidopa-levodopa  mg (SINEMET)  mg per tablet TAKE 1 TABLET BY MOUTH FOUR TIMES DAILY 360 tablet 2    dulaglutide (TRULICITY) 1.5 mg/0.5 mL pen injector Inject 1.5 mg into the skin every 7 days. 4 Pen 11    GEMTESA 75 mg Tab Take 1 tablet by mouth Daily.      JARDIANCE 25 mg tablet Take 25 mg by mouth once daily.      lubiprostone (AMITIZA) 24 MCG Cap Take 1 capsule (24 mcg total) by mouth 2 (two) times daily with meals. 60 capsule 11    rosuvastatin (CRESTOR) 20 MG tablet TAKE 1 TABLET(20 MG) BY MOUTH EVERY EVENING 90 tablet 3    triamcinolone acetonide 0.1% (KENALOG) 0.1 % cream Apply topically 2 (two) times daily. Apply to red itchy rash on leg. Do not use on face. 45 g 0     Current Facility-Administered Medications for the 24 encounter (Office Visit) with Edu Morrow MD   Medication Dose Route Frequency Provider Last Rate Last Admin    incobotulinumtoxinA injection 100 Units  100 Units Intramuscular 1 time in Clinic/HOD         [COMPLETED] influenza (adjuvanted) (Fluad) 45 mcg/0.5 mL IM vaccine (> or = 64 yo) 0.5 mL  0.5 mL Intramuscular 1 time in Clinic/HOD Edu Morrow MD   0.5 mL at 24 2885       Social History     Socioeconomic History    Marital status:    Tobacco Use    Smoking status: Former     Current packs/day: 0.00     Average packs/day: 1 pack/day for 15.0 years (15.0 ttl pk-yrs)     Types: Cigarettes     Start date:      Quit date:      Years since quittin.8     Passive exposure: Current    Smokeless tobacco: Never   Substance and Sexual Activity    Alcohol use: Never    Drug use: Never    Sexual activity: Yes     Partners: Female     Birth control/protection: None     Social Drivers of Health     Financial Resource Strain: Low Risk  (10/21/2024)    Overall  Financial Resource Strain (CARDIA)     Difficulty of Paying Living Expenses: Not hard at all   Food Insecurity: No Food Insecurity (10/21/2024)    Hunger Vital Sign     Worried About Running Out of Food in the Last Year: Never true     Ran Out of Food in the Last Year: Never true   Transportation Needs: No Transportation Needs (5/13/2024)    TRANSPORTATION NEEDS     Transportation : No   Physical Activity: Insufficiently Active (10/21/2024)    Exercise Vital Sign     Days of Exercise per Week: 2 days     Minutes of Exercise per Session: 10 min   Stress: Stress Concern Present (10/21/2024)    [employer] - Occupational Stress Questionnaire     Feeling of Stress : Very much   Housing Stability: Low Risk  (10/21/2024)    Housing Stability Vital Sign     Unable to Pay for Housing in the Last Year: No     Homeless in the Last Year: No        Family History   Problem Relation Name Age of Onset    Diabetes Mother Stacia     Hearing loss Mother Stacia     Stroke Mother Stacia     Cancer Father [place]         bladder ca    Dementia Father [place]     Depression Father Clifford     Learning disabilities Sister Marlys         Patient Care Team:  Edu Morrow MD as PCP - General (Internal Medicine)  Feliciano Escalante Jr., DPM as Consulting Physician (Podiatry)  Jeffry Gra MD as Consulting Physician (Endocrinology)  Jose Ramon Palma MD (Ophthalmology)  Candelario Moore III, MD (Family Medicine)  Farhan Sykes MD as Consulting Physician (Neurology)  Jose Ramon Rea MD as Consulting Physician (Gastroenterology)       Subjective:     Review of Systems   Constitutional:  Negative for chills and fever.   HENT:  Negative for sinus pressure/congestion and sore throat.    Respiratory:  Negative for cough, shortness of breath and wheezing.    Cardiovascular:  Negative for chest pain and leg swelling.   Gastrointestinal:  Negative for abdominal pain, nausea and vomiting.   Genitourinary:  Negative  for dysuria and hematuria.   Musculoskeletal:  Positive for arthralgias (shoulder) and gait problem. Negative for myalgias.   Integumentary:  Negative for rash.   Neurological:  Negative for dizziness and syncope.   Hematological:  Negative for adenopathy.   Psychiatric/Behavioral:  Positive for confusion, dysphoric mood and sleep disturbance.        Patient Reported Health Risk Assessment  What [age] your age?: 70-79  Are you male or female?: Female  During the past four weeks, how much have you been bothered by emotional problems such as feeling anxious, depressed, irritable, sad, or downhearted and blue?: Moderately  During the past five weeks, has your physical and/or emotional health limited your social activities with family, friends, neighbors, or groups?: Not at all  During the past four weeks, how much bodily pain have you generally had?: Mild pain  During the past four weeks, was someone available to help if you needed and wanted help?: Yes, as much as I wanted  During the past four weeks, what was the hardest physical activity you could do for at least two minutes?: Very light  Can you get to places out of walking distance without help?  (For example, can you travel alone on buses or taxis, or drive your own car?): No  Can you go shopping for groceries or clothes without someone's help?: No  Can you prepare your own meals?: No  Can you do your own housework without help?: No  Because of any health problems, do you need the help of another person with your personal care needs such as eating, bathing, dressing, or getting around the house?: Yes  Can you handle your own money without help?: No  During the past four weeks, how would you rate your health in general?: Fair  How have things been going for you during the past four weeks?: Good and bad parts about equal  Are you having difficulties driving your car?: Not applicable, I do not use a car  Do you always fasten your seat belt when you are in a car?: Yes,  "usually  How often in the past four weeks have you been bothered by falling or dizzy when standing up?: Sometimes  How often in the past four weeks have you been bothered by sexual problems?: Never  How often in the past four weeks have you been bothered by trouble eating well?: Sometimes  How often in the past four weeks have you been bothered by teeth or denture problems?: Never  How often in the past four weeks have you been bothered with problems using the telephone?: Sometimes  How often in the past four weeks have you been bothered by tiredness or fatigue?: Sometimes  Have you fallen two or more times in the past year?: Yes  Are you afraid of falling?: Yes  Are you a smoker?: No  During the past four weeks, how many drinks of wine, beer, or other alcoholic beverages did you have?: No alcohol at all  Do you exercise for about 20 minutes three or more days a week?: No, I usually do not exercise this much  Have you been given any information to help you with hazards in your house that might hurt you?: Yes  Have you been given any information to help you with keeping track of your medications?: Yes  How often do you have trouble taking medicines the way you've been told to take them?: I always take them as prescribed  How confident are you that you can control and manage most of your health problems?: Very confident  What is your race? (Check all that apply.):     Objective:     /75 (BP Location: Left arm, Patient Position: Sitting)   Pulse 86   Temp 98.1 °F (36.7 °C)   Resp 18   Ht 5' 8" (1.727 m)   Wt 68 kg (150 lb)   SpO2 98%   BMI 22.81 kg/m²     Physical Exam  Vitals and nursing note reviewed.   Constitutional:       General: He is not in acute distress.  HENT:      Head: Normocephalic.      Nose: No rhinorrhea.   Eyes:      Conjunctiva/sclera: Conjunctivae normal.      Pupils: Pupils are equal, round, and reactive to light.   Cardiovascular:      Rate and Rhythm: Normal rate and regular " rhythm.   Pulmonary:      Effort: Pulmonary effort is normal.      Breath sounds: Normal breath sounds.   Abdominal:      Palpations: Abdomen is soft.      Tenderness: There is no abdominal tenderness.   Musculoskeletal:         General: No deformity or signs of injury.   Skin:     General: Skin is warm and dry.   Neurological:      General: No focal deficit present.      Mental Status: He is alert. Mental status is at baseline.      Gait: Gait abnormal (unsteady).   Psychiatric:         Mood and Affect: Mood normal.         Behavior: Behavior normal.      Comments: Memory impaired                No data to display                  11/18/2024     2:00 PM 10/22/2024    10:20 AM 10/1/2024     8:30 AM 9/9/2024    11:30 AM 8/13/2024     1:20 PM 5/13/2024     1:00 PM 5/6/2024     2:00 PM   Fall Risk Assessment - Outpatient   Mobility Status Ambulatory w/ assistance Ambulatory w/ assistance Ambulatory Ambulatory Ambulatory Ambulatory Ambulatory   Number of falls 2+ 2+ 2+ 1 0 0 1 with injury   Identified as fall risk True True True False False False True           Depression Screening  Over the past two weeks, has the patient felt down, depressed, or hopeless?: No  Over the past two weeks, has the patient felt little interest or pleasure in doing things?: No  Assessment/Plan:     1. Medicare annual wellness visit, subsequent  Assessment & Plan:  Reviewed recent wellness labs. See below for health maintenance.    Vaccinations:   - Flu: given today in office 11/18/24   - Pneumonia: Pt reports receiving PCV13 and PCV23 after age 65   - Shingles (>50): declines   - Tdap: received 2020   - COVID: received x 5   - RSV: recommended he receive at pharmacy  Screening:   - Prostate (>45): n/a sec to age and comorbidities   - Lung Ca. Screening (50-80 with >20 pack yrs current or quit <15 yrs): n/a   - Colon Ca. Screening (>45): n/a sec to age and comorbidities   - AAA (65-75 if ever smoked): n/a      2. Need for influenza  vaccination  -     influenza (adjuvanted) (Fluad) 45 mcg/0.5 mL IM vaccine (> or = 66 yo) 0.5 mL           Medicare Annual Wellness and Personalized Prevention Plan:   Fall Risk + Home Safety + Hearing Impairment + Depression Screen + Opioid and Substance Abuse Screening + Cognitive Impairment Screen + Health Risk Assessment all reviewed.     Health Maintenance Topics with due status: Not Due       Topic Last Completion Date    TETANUS VACCINE 06/10/2020    Lipid Panel 10/23/2024    Hemoglobin A1c 10/23/2024      The patient's Health Maintenance was reviewed and the following appears to be due at this time:   Health Maintenance Due   Topic Date Due    Pneumococcal Vaccines (Age 65+) (1 of 2 - PCV) Never done    RSV Vaccine (Age 60+ and Pregnant patients) (1 - 1-dose 75+ series) Never done    Eye Exam  04/13/2023    COVID-19 Vaccine (6 - 2024-25 season) 09/01/2024    Diabetes Urine Screening  11/09/2024       Advance Care Planning   I attest to discussing Advance Care Planning with patient and/or family member.  Education was provided including the importance of the Health Care Power of , Advance Directives, and/or [place] documentation.  The patient expressed understanding to the importance of this information and discussion.         Follow up in about 3 months (around 2/18/2025) for Diabetes. In addition to their scheduled follow up, the patient has also been instructed to follow up on as needed basis.

## 2024-11-18 NOTE — ASSESSMENT & PLAN NOTE
Reviewed recent wellness labs. See below for health maintenance.    Vaccinations:   - Flu: given today in office 11/18/24   - Pneumonia: Pt reports receiving PCV13 and PCV23 after age 65   - Shingles (>50): declines   - Tdap: received 2020   - COVID: received x 5   - RSV: recommended he receive at pharmacy  Screening:   - Prostate (>45): n/a sec to age and comorbidities   - Lung Ca. Screening (50-80 with >20 pack yrs current or quit <15 yrs): n/a   - Colon Ca. Screening (>45): n/a sec to age and comorbidities   - AAA (65-75 if ever smoked): n/a

## 2024-11-20 ENCOUNTER — OFFICE VISIT (OUTPATIENT)
Dept: ORTHOPEDICS | Facility: CLINIC | Age: 79
End: 2024-11-20
Payer: MEDICARE

## 2024-11-20 ENCOUNTER — TELEPHONE (OUTPATIENT)
Dept: PRIMARY CARE CLINIC | Facility: CLINIC | Age: 79
End: 2024-11-20
Payer: MEDICARE

## 2024-11-20 ENCOUNTER — HOSPITAL ENCOUNTER (OUTPATIENT)
Dept: RADIOLOGY | Facility: CLINIC | Age: 79
Discharge: HOME | End: 2024-11-20
Attending: ORTHOPAEDIC SURGERY
Payer: MEDICARE

## 2024-11-20 VITALS
WEIGHT: 149.94 LBS | DIASTOLIC BLOOD PRESSURE: 65 MMHG | HEART RATE: 81 BPM | HEIGHT: 68 IN | BODY MASS INDEX: 22.72 KG/M2 | SYSTOLIC BLOOD PRESSURE: 115 MMHG

## 2024-11-20 DIAGNOSIS — F33.1 MODERATE EPISODE OF RECURRENT MAJOR DEPRESSIVE DISORDER: Primary | ICD-10-CM

## 2024-11-20 DIAGNOSIS — S49.91XA RIGHT SHOULDER INJURY, INITIAL ENCOUNTER: ICD-10-CM

## 2024-11-20 DIAGNOSIS — M12.811 ROTATOR CUFF ARTHROPATHY OF RIGHT SHOULDER: Primary | ICD-10-CM

## 2024-11-20 PROCEDURE — 73030 X-RAY EXAM OF SHOULDER: CPT | Mod: RT,,, | Performed by: ORTHOPAEDIC SURGERY

## 2024-11-20 RX ORDER — LIDOCAINE HYDROCHLORIDE 20 MG/ML
5 INJECTION, SOLUTION EPIDURAL; INFILTRATION; INTRACAUDAL; PERINEURAL
Status: DISCONTINUED | OUTPATIENT
Start: 2024-11-20 | End: 2024-11-20 | Stop reason: HOSPADM

## 2024-11-20 RX ORDER — BETAMETHASONE SODIUM PHOSPHATE AND BETAMETHASONE ACETATE 3; 3 MG/ML; MG/ML
6 INJECTION, SUSPENSION INTRA-ARTICULAR; INTRALESIONAL; INTRAMUSCULAR; SOFT TISSUE
Status: DISCONTINUED | OUTPATIENT
Start: 2024-11-20 | End: 2024-11-20 | Stop reason: HOSPADM

## 2024-11-20 RX ORDER — BUPROPION HYDROCHLORIDE 150 MG/1
150 TABLET, EXTENDED RELEASE ORAL EVERY MORNING
Qty: 90 TABLET | Refills: 3 | Status: SHIPPED | OUTPATIENT
Start: 2024-11-20 | End: 2025-11-20

## 2024-11-20 RX ADMIN — LIDOCAINE HYDROCHLORIDE 5 ML: 20 INJECTION, SOLUTION EPIDURAL; INFILTRATION; INTRACAUDAL; PERINEURAL at 10:11

## 2024-11-20 RX ADMIN — BETAMETHASONE SODIUM PHOSPHATE AND BETAMETHASONE ACETATE 6 MG: 3; 3 INJECTION, SUSPENSION INTRA-ARTICULAR; INTRALESIONAL; INTRAMUSCULAR; SOFT TISSUE at 10:11

## 2024-11-20 NOTE — PROGRESS NOTES
"Chief Complaint:   Chief Complaint   Patient presents with    Right Shoulder - Injury, Results     Traumatic complete tear of Rt Rotator Cuff Tear, MRI 10/29/24, DOI: 10/16/24, went to the bathroom the use the restroom- slipped and fell- fell on the tub, home medica came to the house,       Consulting Physician: Edu Morrow MD    History of present illness:    he is a pleasant 79 y.o. year old male who slipped in the bathroom on 10/16/2024 injured his right shoulder.  Since that time had some difficulty in lifting the right arm.  He has noticed pain.  He has a history of Parkinson's and is now using walker to ambulate.  He denies any new numbness or tingling.  Prior to the fall his shoulder was pretty well functioning forearm    Past Medical History:   Diagnosis Date    ADHD (attention deficit hyperactivity disorder)     Cancer     skin cancer    Depression     Diabetes mellitus, type 2     [place] (hard of hearing)     Loss of equilibrium     Parkinson's disease     S/P epidural steroid injection     Sleep apnea, unspecified     "not using Cpap"    Smoker        Past Surgical History:   Procedure Laterality Date    CATARACT EXTRACTION, BILATERAL  2021    CHOLECYSTECTOMY  2005    COLONOSCOPY      exc growth eyelid      EYE SURGERY  2021    Cataract surgery on both eyes    HERNIA REPAIR      INJECTION OF ANESTHETIC AGENT AROUND MEDIAL BRANCH NERVES INNERVATING LUMBAR FACET JOINT Left 09/13/2023    Procedure: Block-nerve-medial branch-lumbar;  Surgeon: Diana Turcios MD;  Location: [facility] OR;  Service: Pain Management;  Laterality: Left;  Lt L3, L4 L5    INJECTION OF ANESTHETIC AGENT AROUND MEDIAL BRANCH NERVES INNERVATING LUMBAR FACET JOINT Left 11/01/2023    Procedure: Block-nerve-medial branch-lumbar;  Surgeon: Diana Turcios MD;  Location: [facility] OR;  Service: Pain Management;  Laterality: Left;  Left MBB L3, L4, L5    INJECTION OF STEROID Left 09/27/2023    left shoulder steroid injection-Dr Hou    " TONSILLECTOMY  1950       Current Outpatient Medications   Medication Sig    acetaminophen (TYLENOL) 325 MG tablet Take 325 mg by mouth every 6 (six) hours as needed for Pain.    blood-glucose meter kit by Other route. Use as instructed    Accu-check guide  Accu-check fastclix lancets    carbidopa-levodopa  mg (SINEMET)  mg per tablet TAKE 1 TABLET BY MOUTH FOUR TIMES DAILY    dulaglutide (TRULICITY) 1.5 mg/0.5 mL pen injector Inject 1.5 mg into the skin every 7 days.    GEMTESA 75 mg Tab Take 1 tablet by mouth Daily.    JARDIANCE 25 mg tablet Take 25 mg by mouth once daily.    lubiprostone (AMITIZA) 24 MCG Cap Take 1 capsule (24 mcg total) by mouth 2 (two) times daily with meals.    mirtazapine (REMERON) 15 MG tablet Take 7.5 mg by mouth every evening.    rosuvastatin (CRESTOR) 20 MG tablet TAKE 1 TABLET(20 MG) BY MOUTH EVERY EVENING    triamcinolone acetonide 0.1% (KENALOG) 0.1 % cream Apply topically 2 (two) times daily. Apply to red itchy rash on leg. Do not use on face.    buPROPion (WELLBUTRIN SR) 150 MG TBSR 12 hr tablet Take 1 tablet (150 mg total) by mouth every morning.     Current Facility-Administered Medications   Medication    incobotulinumtoxinA injection 100 Units       Review of patient's allergies indicates:  No Known Allergies    Family History   Problem Relation Name Age of Onset    Diabetes Mother Stacia     Hearing loss Mother Stacia     Stroke Mother Stacia     Cancer Father [place]         bladder ca    Dementia Father [place]     Depression Father Clifford     Learning disabilities Sister Marlys        Social History     Socioeconomic History    Marital status:    Tobacco Use    Smoking status: Former     Current packs/day: 0.00     Average packs/day: 1 pack/day for 15.0 years (15.0 ttl pk-yrs)     Types: Cigarettes     Start date:      Quit date:      Years since quittin.8     Passive exposure: Current    Smokeless tobacco: Never   Substance and Sexual Activity    Alcohol  "use: Not Currently    Drug use: Never    Sexual activity: Not Currently     Partners: Female     Birth control/protection: None     Social Drivers of Health     Financial Resource Strain: Low Risk  (10/21/2024)    Overall Financial Resource Strain (CARDIA)     Difficulty of Paying Living Expenses: Not hard at all   Food Insecurity: No Food Insecurity (10/21/2024)    Hunger Vital Sign     Worried About Running Out of Food in the Last Year: Never true     Ran Out of Food in the Last Year: Never true   Transportation Needs: No Transportation Needs (5/13/2024)    TRANSPORTATION NEEDS     Transportation : No   Physical Activity: Insufficiently Active (10/21/2024)    Exercise Vital Sign     Days of Exercise per Week: 2 days     Minutes of Exercise per Session: 10 min   Stress: Stress Concern Present (10/21/2024)    [employer] - Occupational Stress Questionnaire     Feeling of Stress : Very much   Housing Stability: Low Risk  (10/21/2024)    Housing Stability Vital Sign     Unable to Pay for Housing in the Last Year: No     Homeless in the Last Year: No       Review of Systems:    Constitution:   Denies chills, fever, and sweats.  HENT:   Denies headaches or blurry vision.  Cardiovascular:  Denies chest pain or irregular heart beat.  Respiratory:   Denies cough or shortness of breath.  Gastrointestinal:  Denies abdominal pain, nausea, or vomiting.  Musculoskeletal:   Denies muscle cramps.  Neurological:   Denies dizziness or focal weakness.  Psychiatric/Behavior: Normal mental status.  Hematology/Lymph:  Denies bleeding problem or easy bruising/bleeding.  Skin:    Denies rash or suspicious lesions.    Examination:    Vital Signs:    Vitals:    11/20/24 1106   BP: 115/65   Pulse: 81   Weight: 68 kg (149 lb 14.6 oz)   Height: 5' 8" (1.727 m)   PainSc:   8   PainLoc: Shoulder       Body mass index is 22.79 kg/m².    Constitution:   Well-developed, well nourished patient in no acute " distress.  Neurological:   Alert and oriented x 3 and cooperative to examination.     Psychiatric/Behavior: Normal mental status.  Respiratory:   No shortness of breath.  Cards:   Pulses palpable, brisk cap refill  Eyes:    Extraoccular muscles intact  Skin:    No scars, rash or suspicious lesions.    MSK:   Shoulder Exam:                   Right        Left  Skin:                                   Normal     Normal  AC joint tenderness:           None         None  Forward Flexion:                 60            180  Abduction:                           60           180  External Rotation:               40              80  Internal Rotation:                80             80    N-V status:                   Intact             Intact    C-spine: Normal ROM, NT      Imaging: X-rays ordered and images interpreted today personally by me of four views of the right shoulder show loss of his acromial humeral index.  MRI was reviewed which shows a chronic supraspinatus tear with a new infraspinatus tear.         Assessment: Rotator cuff arthropathy of right shoulder  -     X-ray Shoulder 2 or More Views Right; Future; Expected date: 11/20/2024        Plan:  Complete retracted rotator cuff tear in the setting of rotator cuff arthropathy.  I think his best surgical option would be a reverse shoulder arthroplasty.  We are 1st going to try a subacromial injection as well as formal physical therapy.  I will see him back in 2 months to  his progress

## 2024-11-20 NOTE — PROCEDURES
Large Joint Aspiration/Injection: R subacromial bursa    Date/Time: 11/20/2024 10:30 AM    Performed by: Kiersten Peña FNP  Authorized by: Cipriano Hou Jr., MD    Consent Done?:  Yes (Verbal)  Indications:  Pain  Site marked: the procedure site was marked    Timeout: prior to procedure the correct patient, procedure, and site was verified    Prep: patient was prepped and draped in usual sterile fashion      Local anesthesia used?: Yes    Local anesthetic:  Topical anesthetic    Details:  Needle Size:  21 G  Ultrasonic Guidance for needle placement?: No    Approach:  Posterior  Location:  Shoulder  Site:  R subacromial bursa  Medications:  5 mL LIDOcaine (PF) 20 mg/mL (2%) 20 mg/mL (2 %); 6 mg betamethasone acetate-betamethasone sodium phosphate 6 mg/mL  Patient tolerance:  Patient tolerated the procedure well with no immediate complications

## 2024-11-27 ENCOUNTER — EXTERNAL HOME HEALTH (OUTPATIENT)
Dept: HOME HEALTH SERVICES | Facility: HOSPITAL | Age: 79
End: 2024-11-27
Payer: MEDICARE

## 2024-12-04 ENCOUNTER — TELEPHONE (OUTPATIENT)
Dept: PRIMARY CARE CLINIC | Facility: CLINIC | Age: 79
End: 2024-12-04
Payer: MEDICARE

## 2024-12-04 NOTE — TELEPHONE ENCOUNTER
Spoke with Drew with [facility]. States that she had a recent visit with Mr. Warren and was told that he discontinued Wellbutrin 2 weeks ago. Mr. Warren reported that he didn't like the way the medication made him feel. Pt reported that he was on Lexapro 10mg for roughly 30 years and did well on this medication. He is requesting to be prescribed Lexapro 10mg in place of Wellbutrin. Advised that a message would be sent to Dr. Morrow.

## 2024-12-06 ENCOUNTER — TELEPHONE (OUTPATIENT)
Dept: NEUROLOGY | Facility: CLINIC | Age: 79
End: 2024-12-06
Payer: MEDICARE

## 2024-12-06 NOTE — TELEPHONE ENCOUNTER
Call received from Patients' HH nurse stating that she had visit with pt. In [place]  and he was doing well, disha when she went back later in the day to draw some labs, he appeared to be a little confused  and got dizzy at times. Nurse states that the pt.'s spouse stated that he sometimes gets this way and wondered if it could maybe be associated with his Parkinsons medication (Carb Dopa-LevoDopa). Pt. Stated that she will be calling to check on him sometimes this morning  and will let us know if he is worse.

## 2024-12-09 ENCOUNTER — DOCUMENT SCAN (OUTPATIENT)
Dept: HOME HEALTH SERVICES | Facility: HOSPITAL | Age: 79
End: 2024-12-09
Payer: MEDICARE

## 2024-12-10 ENCOUNTER — OFFICE VISIT (OUTPATIENT)
Dept: NEUROLOGY | Facility: CLINIC | Age: 79
End: 2024-12-10
Payer: MEDICARE

## 2024-12-10 VITALS
HEIGHT: 68 IN | DIASTOLIC BLOOD PRESSURE: 68 MMHG | WEIGHT: 150 LBS | SYSTOLIC BLOOD PRESSURE: 118 MMHG | BODY MASS INDEX: 22.73 KG/M2

## 2024-12-10 DIAGNOSIS — G20.A2 PARKINSON'S DISEASE WITHOUT DYSKINESIA, WITH FLUCTUATING MANIFESTATIONS: Primary | ICD-10-CM
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
## 2024-12-10 DIAGNOSIS — F41.9 ANXIETY: ICD-10-CM

## 2024-12-10 DIAGNOSIS — G45.9 TRANSIENT CEREBRAL ISCHEMIA, UNSPECIFIED TYPE: ICD-10-CM

## 2024-12-10 PROCEDURE — 99999 PR PBB SHADOW E&M-EST. PATIENT-LVL III: CPT | Mod: PBBFAC,,, | Performed by: NURSE PRACTITIONER

## 2024-12-10 PROCEDURE — 99215 OFFICE O/P EST HI 40 MIN: CPT | Mod: S$PBB,,, | Performed by: NURSE PRACTITIONER

## 2024-12-10 PROCEDURE — 99213 OFFICE O/P EST LOW 20 MIN: CPT | Mod: PBBFAC | Performed by: NURSE PRACTITIONER

## 2024-12-10 RX ORDER — SERTRALINE HYDROCHLORIDE 25 MG/1
25 TABLET, FILM COATED ORAL DAILY
Qty: 30 TABLET | Refills: 11 | Status: SHIPPED | OUTPATIENT
Start: 2024-12-10

## 2024-12-10 NOTE — PROGRESS NOTES
"  Neurology Follow up Note    Subjective:         Patient ID: Clifford Warren is a 79 y.o. male.    Chief Complaint: worsening PD symptoms - asked for sooner appt    HPI:            Message received from pt wife stating patient PD was worsening and she was concerned. I asked that he come in for eval.    He is more bradykinetic  He is off balanced   He has gait festination    He is more dizzy  His memory is failing    He has stopped RISE and now has HH with PT/OT for R rotator cuff injury    CD/LD 1 tab QID    He is falling frequently     He is more easily agitated and irritable.     ROS: as per HPI, otherwise pertinent systems review is negative          Past Medical History:   Diagnosis Date    ADHD (attention deficit hyperactivity disorder)     Cancer     skin cancer    Depression     Diabetes mellitus, type 2     [place] (hard of hearing)     Loss of equilibrium     Parkinson's disease     S/P epidural steroid injection     Sleep apnea, unspecified     "not using Cpap"    Smoker        Past Surgical History:   Procedure Laterality Date    CATARACT EXTRACTION, BILATERAL  2021    CHOLECYSTECTOMY  2005    COLONOSCOPY      exc growth eyelid      EYE SURGERY  2021    Cataract surgery on both eyes    HERNIA REPAIR      INJECTION OF ANESTHETIC AGENT AROUND MEDIAL BRANCH NERVES INNERVATING LUMBAR FACET JOINT Left 09/13/2023    Procedure: Block-nerve-medial branch-lumbar;  Surgeon: Diana Turcios MD;  Location: [facility] [place] OR;  Service: Pain Management;  Laterality: Left;  Lt L3, L4 L5    INJECTION OF ANESTHETIC AGENT AROUND MEDIAL BRANCH NERVES INNERVATING LUMBAR FACET JOINT Left 11/01/2023    Procedure: Block-nerve-medial branch-lumbar;  Surgeon: Diana Turcios MD;  Location: [facility] OR;  Service: Pain Management;  Laterality: Left;  Left MBB L3, L4, L5    INJECTION OF STEROID Left 09/27/2023    left shoulder steroid injection-Dr Hou    TONSILLECTOMY  1950       Family History   Problem Relation Name Age of Onset    " Diabetes Mother Stacia     Hearing loss Mother Stacia     Stroke Mother Stacia     Cancer Father [place]         bladder ca    Dementia Father Clifford     Depression Father Clifford     Learning disabilities Sister Marlys        Social History     Socioeconomic History    Marital status:    Tobacco Use    Smoking status: Former     Current packs/day: 0.00     Average packs/day: 1 pack/day for 15.0 years (15.0 ttl pk-yrs)     Types: Cigarettes     Start date:      Quit date:      Years since quittin.9     Passive exposure: Current    Smokeless tobacco: Never   Substance and Sexual Activity    Alcohol use: Not Currently    Drug use: Never    Sexual activity: Not Currently     Partners: Female     Birth control/protection: None     Social Drivers of Health     Financial Resource Strain: Low Risk  (10/21/2024)    Overall Financial Resource Strain (CARDIA)     Difficulty of Paying Living Expenses: Not hard at all   Food Insecurity: No Food Insecurity (10/21/2024)    Hunger Vital Sign     Worried About Running Out of Food in the Last Year: Never true     Ran Out of Food in the Last Year: Never true   Transportation Needs: No Transportation Needs (2024)    TRANSPORTATION NEEDS     Transportation : No   Physical Activity: Insufficiently Active (10/21/2024)    Exercise Vital Sign     Days of Exercise per Week: 2 days     Minutes of Exercise per Session: 10 min   Stress: Stress Concern Present (10/21/2024)    [employer] [place] [employer] - Occupational Stress Questionnaire     Feeling of Stress : Very much   Housing Stability: Low Risk  (10/21/2024)    Housing Stability Vital Sign     Unable to Pay for Housing in the Last Year: No     Homeless in the Last Year: No       Review of patient's allergies indicates:  No Known Allergies    Current Outpatient Medications   Medication Instructions    acetaminophen (TYLENOL) 325 mg, Every 6 hours PRN    blood-glucose meter kit by Other route. Use as instructed     "Accu-check guide  Accu-check fastclix lancets    carbidopa-levodopa  mg (SINEMET)  mg per tablet 1 tablet, Oral, 4 times daily    GEMTESA 75 mg Tab 1 tablet, Daily    JARDIANCE 25 mg, Daily    lubiprostone (AMITIZA) 24 mcg, Oral, 2 times daily with meals    rosuvastatin (CRESTOR) 20 mg, Oral, Nightly    triamcinolone acetonide 0.1% (KENALOG) 0.1 % cream Topical (Top), 2 times daily, Apply to red itchy rash on leg. Do not use on face.    TRULICITY 1.5 mg, Subcutaneous, Every 7 days       Objective:      Exam:   Visit Vitals  /68 (BP Location: Left arm, Patient Position: Sitting)   Ht 5' 8" (1.727 m)   Wt 68 kg (150 lb)   BMI 22.81 kg/m²       Physical Exam  Vitals reviewed.   Constitutional:       Appearance: Parkinsonian; decreased eye blink and mask face;      bradykinetic     Accompanied by: wife and daughter  HENT:      Ears:      Comments: [place]; requires hearing aids but did not have today  Eyes:      Extraocular Movements: Extraocular movements intact. No       vertical/horizontal deficits     VF's ok  Cardiovascular:      Rate and Rhythm: Normal rate and regular rhythm.   Pulmonary:      Effort: Pulmonary effort is normal.      Breath sounds: Normal breath sounds.   Musculoskeletal:         General: Normal range of motion.   Skin:     General: Skin is warm and dry.   Neurological:      General: No focal deficit present.      Mental Status: alert     Speech: dysarthric at times     Face: symmetric     Motor: nonlateralizing; slow to raise from the chair     F to N bradykinetic     R wrist cogwheel rigidity with potentiation     Impaired Heather R>L     Tremor: none today      Gait: Unassisted; bradykinetic, short stride, no arm swing,      turn en bloc, no tremor; gait festination   Psychiatric:         Mood and Affect: Mood normal.         Behavior: Behavior normal.      He looks undermedicated to me       Assessment/Plan:   1. Parkinson's disease without dyskinesia, with fluctuating " manifestations (Primary)    2. Falls frequently  3. Anxiety     Increase the CD./LD to 1.5 tabs QID  Reminded patient/family about potential PD med side effects, which include but not limited to impulse control disorders (ex: pathologic gambling, shopping, or preoccupation with sexual thoughts or behaviors), excessive daytime sleepiness, hallucinations and sleep attacks. Discussed that driving may pose an increased risk to patients on these medications. Hypotension is also occasionally associated with Parkinson's medications. Any of these complications should be reported to the prescribing physician or provider so that appropriate further modifications can occur.    Fall precautions discussed    CT head w/o contrast    Fall risk discussed; I ask that he use wheelchair or walker at all times; continue HH and PT/OT    May need adjunct such as Amantadine, Requip/Mirapex, entacapone, Nourianz, or Neupro.    Start Sertraline 25 mg tab; 0.5 tab daily for 7 days then increase to 1 tab every morning thereafter. Medication administration personally reviewed with patient by MD/provider. Potential or actual medication changes discussed. Common and potentially serious side effects of medications or medication changes discussed.      Follow up in about 2 months (around 2/10/2025), or if symptoms worsen or fail to improve, for Parkinson's Disease.      Saul Herrera, MSN, APRN, AGACNP-BC

## 2024-12-17 ENCOUNTER — TELEPHONE (OUTPATIENT)
Dept: NEUROLOGY | Facility: CLINIC | Age: 79
End: 2024-12-17
Payer: MEDICARE

## 2024-12-17 NOTE — TELEPHONE ENCOUNTER
----- Message from Farhan Sykes MD sent at 12/13/2024  1:25 PM CST -----  12.2024 Agree global atrophy.  Mild / stable chronic ischemic changes.   Detail Level: Zone

## 2025-01-04 PROCEDURE — G0179 MD RECERTIFICATION HHA PT: HCPCS | Mod: ,,, | Performed by: STUDENT IN AN ORGANIZED HEALTH CARE EDUCATION/TRAINING PROGRAM

## 2025-01-15 ENCOUNTER — DOCUMENT SCAN (OUTPATIENT)
Dept: HOME HEALTH SERVICES | Facility: HOSPITAL | Age: 80
End: 2025-01-15
Payer: MEDICARE

## 2025-01-18 DIAGNOSIS — G20.C PARKINSONISM, UNSPECIFIED PARKINSONISM TYPE: ICD-10-CM

## 2025-01-21 RX ORDER — CARBIDOPA AND LEVODOPA 25; 100 MG/1; MG/1
1.5 TABLET ORAL 4 TIMES DAILY
Qty: 180 TABLET | Refills: 2 | Status: SHIPPED | OUTPATIENT
Start: 2025-01-21 | End: 2026-01-21

## 2025-01-25 DIAGNOSIS — G20.C PARKINSONISM, UNSPECIFIED PARKINSONISM TYPE: ICD-10-CM

## 2025-01-27 RX ORDER — CARBIDOPA AND LEVODOPA 25; 100 MG/1; MG/1
1 TABLET ORAL 3 TIMES DAILY
Qty: 270 TABLET | OUTPATIENT
Start: 2025-01-27

## 2025-02-11 ENCOUNTER — EXTERNAL HOME HEALTH (OUTPATIENT)
Dept: HOME HEALTH SERVICES | Facility: HOSPITAL | Age: 80
End: 2025-02-11
Payer: MEDICARE

## 2025-02-18 ENCOUNTER — OFFICE VISIT (OUTPATIENT)
Dept: PRIMARY CARE CLINIC | Facility: CLINIC | Age: 80
End: 2025-02-18
Payer: MEDICARE

## 2025-02-18 DIAGNOSIS — G47.9 SLEEP DISTURBANCE: ICD-10-CM

## 2025-02-18 DIAGNOSIS — R35.0 URINARY FREQUENCY: ICD-10-CM

## 2025-02-18 DIAGNOSIS — E11.65 TYPE 2 DIABETES MELLITUS WITH HYPERGLYCEMIA, WITHOUT LONG-TERM CURRENT USE OF INSULIN: Primary | ICD-10-CM

## 2025-02-18 LAB — HBA1C MFR BLD: 7.6 %

## 2025-02-18 PROCEDURE — 99214 OFFICE O/P EST MOD 30 MIN: CPT | Mod: ,,, | Performed by: STUDENT IN AN ORGANIZED HEALTH CARE EDUCATION/TRAINING PROGRAM

## 2025-02-18 PROCEDURE — 83036 HEMOGLOBIN GLYCOSYLATED A1C: CPT | Mod: QW,,, | Performed by: STUDENT IN AN ORGANIZED HEALTH CARE EDUCATION/TRAINING PROGRAM

## 2025-02-18 RX ORDER — QUETIAPINE FUMARATE 25 MG/1
25 TABLET, FILM COATED ORAL NIGHTLY
Qty: 90 TABLET | Refills: 3 | Status: SHIPPED | OUTPATIENT
Start: 2025-02-18 | End: 2025-03-11

## 2025-02-18 NOTE — ASSESSMENT & PLAN NOTE
Chronic issue with side effect of treatment (unintentional weight loss from Trulicity)    A1c 7.6%  Continue Jardiance 25 mg daily    Discontinue Trulicity sec to weight loss (12 lbs in past 3 months).     Add Tradjenta 5 mg daily

## 2025-02-18 NOTE — PROGRESS NOTES
"Subjective:       Patient ID: Clifford Warren is a 80 y.o. male.    -------------------------------------    ADHD (attention deficit hyperactivity disorder)    Cancer    skin cancer    Depression    Diabetes mellitus, type 2    [place] (hard of hearing)    Loss of equilibrium    Parkinson's disease    S/P epidural steroid injection    Sleep apnea, unspecified    "not using Cpap"    Smoker        Chief Complaint: Follow-up and Diabetes    DM2 - A1c 7.6%. He's on trulicity but losing too much weight, BMI now down to 22, appetite is very poor and he's feeling weaker    Sleep issues - atypical sleep schedule, wakes up multiple times/night      Review of Systems   Constitutional:  Negative for chills and fever.   HENT:  Negative for sinus pressure/congestion and sore throat.    Respiratory:  Negative for cough, shortness of breath and wheezing.    Cardiovascular:  Negative for chest pain and leg swelling.   Gastrointestinal:  Negative for abdominal pain, nausea and vomiting.   Genitourinary:  Positive for frequency. Negative for dysuria and hematuria.   Musculoskeletal:  Positive for arthralgias (shoulder) and gait problem. Negative for myalgias.   Integumentary:  Negative for rash.   Neurological:  Negative for dizziness and syncope.   Hematological:  Negative for adenopathy.   Psychiatric/Behavioral:  Positive for confusion, dysphoric mood and sleep disturbance.            Objective:      Physical Exam  Vitals and nursing note reviewed.   Constitutional:       General: He is not in acute distress.  HENT:      Head: Normocephalic.      Nose: No rhinorrhea.   Eyes:      Conjunctiva/sclera: Conjunctivae normal.      Pupils: Pupils are equal, round, and reactive to light.   Cardiovascular:      Rate and Rhythm: Normal rate and regular rhythm.   Pulmonary:      Effort: Pulmonary effort is normal.      Breath sounds: Normal breath sounds.   Abdominal:      Palpations: Abdomen is soft.      Tenderness: There is no " abdominal tenderness.   Musculoskeletal:         General: No deformity or signs of injury.   Skin:     General: Skin is warm and dry.   Neurological:      General: No focal deficit present.      Mental Status: He is alert. Mental status is at baseline.      Gait: Gait abnormal (unsteady).   Psychiatric:         Mood and Affect: Mood normal.         Behavior: Behavior normal.      Comments: Memory impaired           Assessment & Plan:   1. Type 2 diabetes mellitus with hyperglycemia, without long-term current use of insulin  Overview:  Metformin caused diarrhea.  Trulicity caused excessive weight loss.    Assessment & Plan:  Chronic issue with side effect of treatment (unintentional weight loss from Trulicity)    A1c 7.6%  Continue Jardiance 25 mg daily    Discontinue Trulicity sec to weight loss (12 lbs in past 3 months).     Add Tradjenta 5 mg daily    Orders:  -     POCT HEMOGLOBIN A1C  -     linaGLIPtin (TRADJENTA) 5 mg Tab tablet; Take 1 tablet (5 mg total) by mouth once daily.  Dispense: 90 tablet; Refill: 3    2. Sleep disturbance  Comments:  start trial of low dose Seroquel 25 mg nightly  Orders:  -     Discontinue: QUEtiapine (SEROQUEL) 25 MG Tab; Take 1 tablet (25 mg total) by mouth every evening.  Dispense: 90 tablet; Refill: 3    3. Urinary frequency  -     Urinalysis, Reflex to Urine Culture; Future; Expected date: 02/18/2025      Follow up in about 3 months (around 5/18/2025) for Diabetes. In addition to their scheduled follow up, the patient has also been instructed to follow up on as needed basis.

## 2025-02-20 ENCOUNTER — TELEPHONE (OUTPATIENT)
Dept: PRIMARY CARE CLINIC | Facility: CLINIC | Age: 80
End: 2025-02-20
Payer: MEDICARE

## 2025-02-26 ENCOUNTER — OFFICE VISIT (OUTPATIENT)
Dept: PRIMARY CARE CLINIC | Facility: CLINIC | Age: 80
End: 2025-02-26
Payer: MEDICARE

## 2025-02-26 DIAGNOSIS — S01.01XD LACERATION OF SCALP, SUBSEQUENT ENCOUNTER: Primary | ICD-10-CM

## 2025-02-26 PROCEDURE — 15853 REMOVAL SUTR/STAPL XREQ ANES: CPT | Mod: ,,, | Performed by: STUDENT IN AN ORGANIZED HEALTH CARE EDUCATION/TRAINING PROGRAM

## 2025-02-26 PROCEDURE — 99213 OFFICE O/P EST LOW 20 MIN: CPT | Mod: ,,, | Performed by: STUDENT IN AN ORGANIZED HEALTH CARE EDUCATION/TRAINING PROGRAM

## 2025-02-26 NOTE — PROGRESS NOTES
"Subjective:       Patient ID: Clifford Warren is a 80 y.o. male.    -------------------------------------    ADHD (attention deficit hyperactivity disorder)    Cancer    skin cancer    Depression    Diabetes mellitus, type 2    [place] (hard of hearing)    Loss of equilibrium    Parkinson's disease    S/P epidural steroid injection    Sleep apnea, unspecified    "not using Cpap"    Smoker        Chief Complaint: No chief complaint on file.    Presents today for staple removal. Accompanied by wife and daughter.    Fall with scalp laceration and staple placement 7 days ago (2/19/25). Staples place. ED note reports 11 staples. Myself and MA assisting today only note 8 staples on exam. Suspect some may have fallen out, but it's certainly possible that there are a few staples underneath eschar.      Review of Systems   Constitutional:  Negative for chills and fever.   HENT:  Negative for sinus pressure/congestion and sore throat.    Respiratory:  Negative for cough, shortness of breath and wheezing.    Cardiovascular:  Negative for chest pain and leg swelling.   Gastrointestinal:  Negative for abdominal pain, nausea and vomiting.   Genitourinary:  Negative for dysuria and hematuria.   Musculoskeletal:  Positive for arthralgias (shoulder) and gait problem. Negative for myalgias.   Integumentary:  Positive for wound. Negative for rash.   Neurological:  Negative for dizziness and syncope.   Hematological:  Negative for adenopathy.   Psychiatric/Behavioral:  Positive for confusion, dysphoric mood and sleep disturbance.            Objective:      Physical Exam  Vitals and nursing note reviewed.   Constitutional:       General: He is not in acute distress.  HENT:      Head: Normocephalic.      Comments: Approx 10 cm laceration R posterolateral scalp. Appropriate stage of healing. Dried blood and eschar present.  Eyes:      Conjunctiva/sclera: Conjunctivae normal.   Pulmonary:      Effort: Pulmonary effort is normal. "   Musculoskeletal:         General: No deformity or signs of injury.   Skin:     General: Skin is warm and dry.   Neurological:      General: No focal deficit present.      Mental Status: He is alert. Mental status is at baseline.      Gait: Gait abnormal (unsteady).   Psychiatric:         Mood and Affect: Mood normal.         Behavior: Behavior normal.      Comments: Memory impaired           Assessment & Plan:   1. Laceration of scalp, subsequent encounter  Comments:  see procedure note for detail. Counseled to avoid scrubbing area for at least 3 more days. Ok to get wet. No dressing or ointments needed.  Orders:  -     Suture Removal        Keep scheduled f/u. In addition to their scheduled follow up, the patient has also been instructed to follow up on as needed basis.

## 2025-02-26 NOTE — PROCEDURES
Suture Removal    Date/Time: 2/26/2025 2:00 PM  [place] procedure was performed[place] [facility]    Performed by: Edu Morrow MD  Authorized by: Edu Morrow MD  Pre-operative diagnosis: scalp laceration  Post-operative diagnosis: scalp laceration  Body area: head/neck  Location details: scalp  Description of findings: approx 10 cm laceration in appropriate stage of healing with eschar and dried blood   Wound Appearance: clean, well healed, nontender and no drainage  Sutures Removed: 0  Staples Removed: 8  Post-removal: no dressing applied  Complications: No  Estimated blood loss (mL): 0  Specimens: No  Implants: No  Patient tolerance: Patient tolerated the procedure well with no immediate complications  Comments: ED note reports 11 staples placed. Only 8 seen on exam today by MD and only 8 seen by medical assistant. 8 staples removed. Pt's wife counseled to examine area over next several days as scalp/dried blood falls off to look for any remaining staples.

## 2025-03-05 PROCEDURE — G0179 MD RECERTIFICATION HHA PT: HCPCS | Mod: ,,, | Performed by: STUDENT IN AN ORGANIZED HEALTH CARE EDUCATION/TRAINING PROGRAM

## 2025-03-11 ENCOUNTER — PROCEDURE VISIT (OUTPATIENT)
Dept: NEUROLOGY | Facility: CLINIC | Age: 80
End: 2025-03-11
Payer: MEDICARE

## 2025-03-11 VITALS
DIASTOLIC BLOOD PRESSURE: 62 MMHG | SYSTOLIC BLOOD PRESSURE: 114 MMHG | BODY MASS INDEX: 22.73 KG/M2 | HEIGHT: 68 IN | WEIGHT: 150 LBS

## 2025-03-11 DIAGNOSIS — K11.7 SIALORRHEA: ICD-10-CM

## 2025-03-11 DIAGNOSIS — G47.20 CIRCADIAN DYSREGULATION: ICD-10-CM

## 2025-03-11 DIAGNOSIS — G20.C PARKINSONISM, UNSPECIFIED PARKINSONISM TYPE: Primary | ICD-10-CM

## 2025-03-11 DIAGNOSIS — F03.911 DEMENTIA WITH AGITATION, UNSPECIFIED DEMENTIA SEVERITY, UNSPECIFIED DEMENTIA TYPE: ICD-10-CM

## 2025-03-11 DIAGNOSIS — R26.9 NEUROLOGIC GAIT DYSFUNCTION: ICD-10-CM

## 2025-03-11 PROCEDURE — 64611 CHEMODENERV SALIV GLANDS: CPT | Mod: PBBFAC | Performed by: SPECIALIST

## 2025-03-11 RX ORDER — SENNOSIDES 8.6 MG/1
1 TABLET ORAL DAILY
COMMUNITY

## 2025-03-11 RX ORDER — MEMANTINE HYDROCHLORIDE 5 MG/1
5 TABLET ORAL 2 TIMES DAILY
Qty: 60 TABLET | Refills: 2 | Status: SHIPPED | OUTPATIENT
Start: 2025-03-11 | End: 2026-03-11

## 2025-03-11 RX ORDER — ROSUVASTATIN CALCIUM 20 MG/1
TABLET, COATED ORAL
COMMUNITY

## 2025-03-11 RX ORDER — POLYETHYLENE GLYCOL 3350 17 G/17G
POWDER, FOR SOLUTION ORAL
COMMUNITY

## 2025-03-11 RX ORDER — BISACODYL 5 MG
5 TABLET, DELAYED RELEASE (ENTERIC COATED) ORAL DAILY PRN
COMMUNITY

## 2025-03-11 NOTE — PROCEDURES
Botulinum toxin [Xeomin] given for sialorrhea assoc w PD  or other _______  Informed consent had been obtained     Xeomin dilution 1cc per 100 u / so ten u per tenth ml    32 gauge half inch needle used     30u each parotid  20u each submandib    No noted immediate complications       Subjective:     Patient ID: Clifford Warren is a 80 y.o. male.    Chief Complaint: PD/parkinsonism follow up   HPI:           F/U PD-Xeomin.     Denies any tremors. Freezing-shuffled gait w falls. Diff w speech, swallowing, drooling and hallucinations. Not Sleeping well wo vivid dreams. Sleeps during the day. Does not drive, lives at home w his wife. Needs asst w ADL's. Pts wife here today.   Personality different   nasty to ppl and loved ones    expletives at times     Wears depends     Not tolerating quetiapine (Rx has his primary md name on it)  too sleepy      A lot weight loss in recent months   Nastier personality lately too     notes may also be on facesheet for HPI, ROS, and other sections     Neurological ROS:   Sleep:  RBD:   Falls:  Hallucinations: not now or ever       Current Outpatient Medications   Medication Instructions    acetaminophen (TYLENOL) 325 mg, Every 6 hours PRN    bisacodyL (DULCOLAX) 5 mg, Daily PRN    blood-glucose meter kit by Other route. Use as instructed    Accu-check guide  Accu-check fastclix lancets    carbidopa-levodopa  mg (SINEMET)  mg per tablet 1.5 tablets, Oral, 4 times daily, Dose change 12/10/24    GEMTESA 75 mg Tab 1 tablet, Daily    JARDIANCE 25 mg, Daily    linaGLIPtin (TRADJENTA) 5 mg, Oral, Daily    lubiprostone (AMITIZA) 24 mcg, Oral, 2 times daily with meals    polyethylene glycol (GLYCOLAX) 17 gram PwPk Take by mouth.    QUEtiapine (SEROQUEL) 25 mg, Oral, Nightly    rosuvastatin (CRESTOR) 20 MG tablet 1 tablet Orally Once a day    senna (SENOKOT) 8.6 mg tablet 1 tablet, Daily    sertraline (ZOLOFT) 25 mg, Oral, Daily, 0.5 tab every morning for 7 days then 1 tab  "every morning thereafter    triamcinolone acetonide 0.1% (KENALOG) 0.1 % cream Topical (Top), 2 times daily, Apply to red itchy rash on leg. Do not use on face.     Objective:   If Accompanied, by:    Exam  /62 (BP Location: Right arm, Patient Position: Sitting)   Ht 5' 8" (1.727 m)   Wt 68 kg (150 lb)   BMI 22.81 kg/m²     Heart:   Extr:    Speech:     EOMs:  Cannot look right or left voluntarily    can look down but very little up without moving head   No tremor   Gait: wheelchair      Tremor: none   Bradykinesia: Moderate to severe   Dyskinesias: none seen     Neuroimagin.24 agree global atrophy.  mild chronic isch.    Complexity of Data:     [] High  [x] Moderate   Risks:   [x] High   [] Moderate   MDM:      [x] High   [] Moderate    Assessment/Plan:       ICD-10-CM ICD-9-CM   1. Parkinsonism, unspecified Parkinsonism type  G20.C 332.0   2. Neurologic gait dysfunction  R26.9 781.2   3. Dementia with agitation, unspecified dementia severity, unspecified dementia type  F03.911 294.21   4. Circadian dysregulation  G47.20 327.30   5. Sialorrhea  K11.7 527.7     Other comments/ follow up:    *Parkinson's medications can be associated with certain side effects. Nausea and abdominal symptoms typically improve in time.  Impulse control disorders including persistent thoughts or behaviors involving shopping gambling or sex can be problematic.  Excessive daytime sleepiness including car crashes have been reported. Delusions hallucinations and paranoia can also occur, typically with higher doses in older patients.   Abrupt stoppage of high dose parkinson's medications can be medically troublesome.    No orders of the defined types were placed in this encounter.     Medications Ordered This Encounter   Medications    memantine (NAMENDA) 5 MG Tab     Sig: Take 1 tablet (5 mg total) by mouth 2 (two) times daily.     Dispense:  60 tablet     Refill:  2      Melatonin 3-5 mg suppertime   ok to give with the " memantine (which is 5mg twice daily)     Aim revisit 3 mos visit and injection

## 2025-03-20 ENCOUNTER — OFFICE VISIT (OUTPATIENT)
Dept: PRIMARY CARE CLINIC | Facility: CLINIC | Age: 80
End: 2025-03-20
Payer: MEDICARE

## 2025-03-20 VITALS
DIASTOLIC BLOOD PRESSURE: 76 MMHG | RESPIRATION RATE: 15 BRPM | SYSTOLIC BLOOD PRESSURE: 108 MMHG | HEART RATE: 60 BPM | OXYGEN SATURATION: 100 %

## 2025-03-20 DIAGNOSIS — Z48.02 ENCOUNTER FOR STAPLE REMOVAL: Primary | ICD-10-CM

## 2025-03-20 PROCEDURE — 15853 REMOVAL SUTR/STAPL XREQ ANES: CPT | Mod: ,,, | Performed by: STUDENT IN AN ORGANIZED HEALTH CARE EDUCATION/TRAINING PROGRAM

## 2025-03-20 PROCEDURE — 99213 OFFICE O/P EST LOW 20 MIN: CPT | Mod: ,,, | Performed by: STUDENT IN AN ORGANIZED HEALTH CARE EDUCATION/TRAINING PROGRAM

## 2025-03-20 NOTE — PROGRESS NOTES
"Subjective:       Patient ID: Clifford Warren is a 80 y.o. male.    -------------------------------------    ADHD (attention deficit hyperactivity disorder)    Cancer    skin cancer    Depression    Diabetes mellitus, type 2    [place] (hard of hearing)    Loss of equilibrium    Parkinson's disease    S/P epidural steroid injection    Sleep apnea, unspecified    "not using Cpap"    Smoker        Chief Complaint: No chief complaint on file.    Pt presents for staple removal. Fall on 3/12/25. Went to [facility] ED. 6 staples placed 8 days ago. Recovered as expected.       Review of Systems   Unable to perform ROS: Other   Very limited ROS sec to mental status        Objective:      Physical Exam  Vitals and nursing note reviewed.   Constitutional:       General: He is not in acute distress.  HENT:      Head: Normocephalic.      Comments: Approx 4 cm laceration R posterolateral scalp. Appropriate stage of healing. 6 staples seen.  Eyes:      Conjunctiva/sclera: Conjunctivae normal.   Pulmonary:      Effort: Pulmonary effort is normal.   Musculoskeletal:         General: No deformity or signs of injury.   Skin:     General: Skin is warm and dry.   Neurological:      General: No focal deficit present.      Mental Status: He is alert. Mental status is at baseline.      Gait: Gait abnormal (unsteady).   Psychiatric:         Mood and Affect: Mood normal.         Behavior: Behavior normal.      Comments: Memory impaired           Assessment & Plan:   1. Encounter for staple removal  Comments:  6 staples removed, see procedure note.  Orders:  -     Suture Removal      Keep scheduled f/u. In addition to their scheduled follow up, the patient has also been instructed to follow up on as needed basis.   "

## 2025-03-20 NOTE — PROCEDURES
Suture Removal    Date/Time: 3/20/2025 10:20 AM  [place] procedure [place] [facility]    Performed by: Edu Morrow MD  Authorized by: Edu Morrow MD  Pre-operative diagnosis: scalp laceration  Post-operative diagnosis: scalp laceration  Body area: head/neck  Location details: scalp  Description of findings: Clean and dry scalp laceration in appropriate stage of healing, 6 staples seen which agrees with ED note   Wound Appearance: clean, well healed, nontender and no drainage  Sutures Removed: 0  Staples Removed: 6  Post-removal: no dressing applied  Complications: No  Estimated blood loss (mL): 0  Specimens: No  Implants: No  Patient tolerance: Patient tolerated the procedure well with no immediate complications      Edu Morrow MD

## 2025-04-09 ENCOUNTER — DOCUMENT SCAN (OUTPATIENT)
Dept: HOME HEALTH SERVICES | Facility: HOSPITAL | Age: 80
End: 2025-04-09
Payer: MEDICARE

## 2025-04-12 DIAGNOSIS — E78.2 MIXED HYPERLIPIDEMIA: Primary | ICD-10-CM

## 2025-04-15 RX ORDER — ROSUVASTATIN CALCIUM 20 MG/1
20 TABLET, COATED ORAL NIGHTLY
Qty: 90 TABLET | Refills: 3 | Status: SHIPPED | OUTPATIENT
Start: 2025-04-15

## 2025-04-25 ENCOUNTER — EXTERNAL HOME HEALTH (OUTPATIENT)
Dept: HOME HEALTH SERVICES | Facility: HOSPITAL | Age: 80
End: 2025-04-25
Payer: MEDICARE

## 2025-05-04 PROCEDURE — G0179 MD RECERTIFICATION HHA PT: HCPCS | Mod: ,,, | Performed by: STUDENT IN AN ORGANIZED HEALTH CARE EDUCATION/TRAINING PROGRAM

## 2025-05-07 DIAGNOSIS — E11.65 TYPE 2 DIABETES MELLITUS WITH HYPERGLYCEMIA, WITHOUT LONG-TERM CURRENT USE OF INSULIN: Primary | ICD-10-CM

## 2025-05-07 RX ORDER — EMPAGLIFLOZIN 25 MG/1
25 TABLET, FILM COATED ORAL DAILY
Qty: 90 TABLET | Refills: 3 | Status: SHIPPED | OUTPATIENT
Start: 2025-05-07

## 2025-05-28 ENCOUNTER — TELEPHONE (OUTPATIENT)
Dept: PRIMARY CARE CLINIC | Facility: CLINIC | Age: 80
End: 2025-05-28
Payer: MEDICARE

## 2025-06-03 ENCOUNTER — OFFICE VISIT (OUTPATIENT)
Dept: PRIMARY CARE CLINIC | Facility: CLINIC | Age: 80
End: 2025-06-03
Payer: MEDICARE

## 2025-06-03 VITALS
WEIGHT: 136 LBS | OXYGEN SATURATION: 98 % | HEART RATE: 91 BPM | HEIGHT: 68 IN | RESPIRATION RATE: 15 BRPM | BODY MASS INDEX: 20.61 KG/M2 | SYSTOLIC BLOOD PRESSURE: 105 MMHG | DIASTOLIC BLOOD PRESSURE: 69 MMHG

## 2025-06-03 DIAGNOSIS — E11.42 TYPE 2 DIABETES MELLITUS WITH DIABETIC POLYNEUROPATHY, WITHOUT LONG-TERM CURRENT USE OF INSULIN: ICD-10-CM

## 2025-06-03 DIAGNOSIS — E11.65 TYPE 2 DIABETES MELLITUS WITH HYPERGLYCEMIA, WITHOUT LONG-TERM CURRENT USE OF INSULIN: Primary | ICD-10-CM

## 2025-06-03 DIAGNOSIS — F33.1 MODERATE EPISODE OF RECURRENT MAJOR DEPRESSIVE DISORDER: ICD-10-CM

## 2025-06-03 LAB — HBA1C MFR BLD: 7.5 %

## 2025-06-03 PROCEDURE — 83036 HEMOGLOBIN GLYCOSYLATED A1C: CPT | Mod: QW,,, | Performed by: STUDENT IN AN ORGANIZED HEALTH CARE EDUCATION/TRAINING PROGRAM

## 2025-06-03 PROCEDURE — 99214 OFFICE O/P EST MOD 30 MIN: CPT | Mod: ,,, | Performed by: STUDENT IN AN ORGANIZED HEALTH CARE EDUCATION/TRAINING PROGRAM

## 2025-06-03 PROCEDURE — G2211 COMPLEX E/M VISIT ADD ON: HCPCS | Mod: ,,, | Performed by: STUDENT IN AN ORGANIZED HEALTH CARE EDUCATION/TRAINING PROGRAM

## 2025-06-03 RX ORDER — AMITRIPTYLINE HYDROCHLORIDE 25 MG/1
25 TABLET, FILM COATED ORAL NIGHTLY
COMMUNITY
Start: 2025-05-28

## 2025-06-23 ENCOUNTER — PROCEDURE VISIT (OUTPATIENT)
Dept: NEUROLOGY | Facility: CLINIC | Age: 80
End: 2025-06-23
Payer: MEDICARE

## 2025-06-23 VITALS
SYSTOLIC BLOOD PRESSURE: 98 MMHG | WEIGHT: 140 LBS | BODY MASS INDEX: 21.22 KG/M2 | HEIGHT: 68 IN | DIASTOLIC BLOOD PRESSURE: 64 MMHG

## 2025-06-23 DIAGNOSIS — K11.7 SIALORRHEA: Primary | ICD-10-CM

## 2025-06-23 DIAGNOSIS — G20.A2 PARKINSON'S DISEASE WITHOUT DYSKINESIA, WITH FLUCTUATING MANIFESTATIONS: ICD-10-CM

## 2025-06-23 PROCEDURE — 99999PBSHW PR PBB SHADOW TECHNICAL ONLY FILED TO HB: Mod: JZ,PBBFAC,,

## 2025-06-23 PROCEDURE — 96372 THER/PROPH/DIAG INJ SC/IM: CPT | Mod: PBBFAC

## 2025-06-23 PROCEDURE — 99213 OFFICE O/P EST LOW 20 MIN: CPT | Mod: 25,S$PBB,, | Performed by: SPECIALIST

## 2025-06-23 RX ORDER — MIRTAZAPINE 15 MG/1
15 TABLET, FILM COATED ORAL NIGHTLY
Qty: 30 TABLET | Refills: 11 | Status: SHIPPED | OUTPATIENT
Start: 2025-06-23 | End: 2026-06-23

## 2025-06-23 RX ADMIN — INCOBOTULINUMTOXINA 100 UNITS: 100 INJECTION, POWDER, LYOPHILIZED, FOR SOLUTION INTRAMUSCULAR at 11:06

## 2025-06-23 NOTE — PROCEDURES
Botulinum toxin [Xeomin] given for sialorrhea assoc w PD  or other _______  Informed consent had been obtained     Xeomin dilution 1cc per 100 u / so ten u per tenth ml    32 gauge half inch needle used     30u each parotid  20u each submandib    No noted immediate complications       Stop amitrip  25mg was not enough and 50mg was too much     Stop sertraline   Use mirtazapine 15mg nightly instead     Has tried memantine and quetiapine already

## 2025-06-30 ENCOUNTER — TELEPHONE (OUTPATIENT)
Dept: NEUROLOGY | Facility: CLINIC | Age: 80
End: 2025-06-30
Payer: MEDICARE

## 2025-06-30 RX ORDER — GABAPENTIN 100 MG/1
100 CAPSULE ORAL NIGHTLY
Qty: 30 CAPSULE | Refills: 11 | Status: SHIPPED | OUTPATIENT
Start: 2025-06-30 | End: 2026-06-30

## 2025-06-30 NOTE — TELEPHONE ENCOUNTER
[facility] home health nurse called and said the mirtazapine is keeping the pt too sedated. She even scored it to 7.5 and said it was still too much.     Wants to see if see if he can get on Gabapentin 100mg @ night just to see if it works better. Wife does not want pt on mirtazapine anymore. Wants to see if you agree.      # [phone]

## 2025-07-01 ENCOUNTER — DOCUMENT SCAN (OUTPATIENT)
Dept: HOME HEALTH SERVICES | Facility: HOSPITAL | Age: 80
End: 2025-07-01
Payer: MEDICARE

## 2025-07-03 PROCEDURE — G0179 MD RECERTIFICATION HHA PT: HCPCS | Mod: ,,, | Performed by: STUDENT IN AN ORGANIZED HEALTH CARE EDUCATION/TRAINING PROGRAM

## 2025-07-10 ENCOUNTER — EXTERNAL HOME HEALTH (OUTPATIENT)
Dept: HOME HEALTH SERVICES | Facility: HOSPITAL | Age: 80
End: 2025-07-10
Payer: MEDICARE

## 2025-07-14 DIAGNOSIS — G20.C PARKINSONISM, UNSPECIFIED PARKINSONISM TYPE: ICD-10-CM

## 2025-07-14 RX ORDER — CARBIDOPA AND LEVODOPA 25; 100 MG/1; MG/1
1 TABLET ORAL 4 TIMES DAILY
Qty: 360 TABLET | Refills: 2 | Status: SHIPPED | OUTPATIENT
Start: 2025-07-14

## 2025-07-18 ENCOUNTER — DOCUMENT SCAN (OUTPATIENT)
Dept: HOME HEALTH SERVICES | Facility: HOSPITAL | Age: 80
End: 2025-07-18
Payer: MEDICARE

## 2025-07-31 ENCOUNTER — TELEPHONE (OUTPATIENT)
Dept: PRIMARY CARE CLINIC | Facility: CLINIC | Age: 80
End: 2025-07-31

## 2025-07-31 ENCOUNTER — OFFICE VISIT (OUTPATIENT)
Dept: PRIMARY CARE CLINIC | Facility: CLINIC | Age: 80
End: 2025-07-31
Payer: MEDICARE

## 2025-07-31 VITALS
DIASTOLIC BLOOD PRESSURE: 72 MMHG | RESPIRATION RATE: 15 BRPM | SYSTOLIC BLOOD PRESSURE: 115 MMHG | OXYGEN SATURATION: 96 % | HEART RATE: 83 BPM
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
## 2025-07-31 DIAGNOSIS — J43.2 CENTRILOBULAR EMPHYSEMA: ICD-10-CM

## 2025-07-31 DIAGNOSIS — S01.01XA LACERATION OF SCALP, INITIAL ENCOUNTER: Primary | ICD-10-CM

## 2025-07-31 PROCEDURE — 99213 OFFICE O/P EST LOW 20 MIN: CPT | Mod: ,,, | Performed by: STUDENT IN AN ORGANIZED HEALTH CARE EDUCATION/TRAINING PROGRAM

## 2025-07-31 NOTE — TELEPHONE ENCOUNTER
Copied from CRM #[id number]. Topic: Appointments - Appointment Scheduling  >> Jul 31, 2025  8:58 AM Fannie wrote:  Type:  Same Day Appointment Request    Caller is requesting a same day appointment.  Caller declined first available appointment listed below.    Name of Caller: pt spouse, Ria  When is the first available appointment? Tomorrow at 9:20  Symptoms: head bleeding from fall last night  Best Call Back Number:[phone]  Additional Information:  pt spouse states the pt fell last night - bleeds off and on, pt has Parkinsons and needs advise or an appt today

## 2025-08-07 ENCOUNTER — PATIENT OUTREACH (OUTPATIENT)
Dept: NEUROLOGY | Facility: CLINIC | Age: 80
End: 2025-08-07
Payer: MEDICARE

## 2025-08-07 NOTE — PROGRESS NOTES
Dementia Care Management    Recruitment Call      Date of Service: 2025  Care Navigator completing this form: Layne Carlton  PCP: Edu Morrow MD    Patient: Clifford Warren  MRN: [id number]  : 1945  Age: 80 y.o.  Gender: male  Race: White  Ethnicity: Not  or /a    Objective:   Patient and caregiver potentially eligible for [facility] dementia care management programs.    CTN/SW completed outreach attempt on 2025 to assess patient/caregiver interest in the program and screen for eligibility.     Outreach Outcome:   Unable to reach dyad - Left detailed voicemail message with call back number. CTN/SW will make another attempt on 25.

## 2025-08-12 ENCOUNTER — PATIENT OUTREACH (OUTPATIENT)
Dept: NEUROLOGY | Facility: CLINIC | Age: 80
End: 2025-08-12
Payer: MEDICARE

## 2025-08-14 ENCOUNTER — PATIENT OUTREACH (OUTPATIENT)
Dept: NEUROLOGY | Facility: CLINIC | Age: 80
End: 2025-08-14
Payer: MEDICARE

## 2025-08-21 ENCOUNTER — TELEPHONE (OUTPATIENT)
Dept: PRIMARY CARE CLINIC | Facility: CLINIC | Age: 80
End: 2025-08-21
Payer: MEDICARE

## 2025-08-21 ENCOUNTER — PATIENT OUTREACH (OUTPATIENT)
Dept: NEUROLOGY | Facility: CLINIC | Age: 80
End: 2025-08-21
Payer: MEDICARE

## 2025-08-25 ENCOUNTER — TELEPHONE (OUTPATIENT)
Dept: PRIMARY CARE CLINIC | Facility: CLINIC | Age: 80
End: 2025-08-25
Payer: MEDICARE

## 2025-08-25 DIAGNOSIS — K59.09 CHRONIC CONSTIPATION: ICD-10-CM

## 2025-08-25 RX ORDER — LUBIPROSTONE 0.02 MG/1
24 CAPSULE ORAL 2 TIMES DAILY WITH MEALS
Qty: 60 CAPSULE | Refills: 11 | Status: SHIPPED | OUTPATIENT
Start: 2025-08-25

## 2025-08-29 ENCOUNTER — EXTERNAL HOME HEALTH (OUTPATIENT)
Dept: HOME HEALTH SERVICES | Facility: HOSPITAL | Age: 80
End: 2025-08-29
Payer: MEDICARE

## (undated) DEVICE — NDL SPINAL 22GA 3.5 IN QUINCKE

## (undated) DEVICE — GLOVE PROTEXIS PI CRM 6.5

## (undated) DEVICE — APPLICATOR CHLORAPREP ORN 26ML

## (undated) DEVICE — NDL FLTR 5MCRN BLNT TIP 18GX1

## (undated) DEVICE — SYR 10CC LUER LOCK

## (undated) DEVICE — DRAPE UTILITY W/ TAPE 20X30IN

## (undated) DEVICE — SET SMARTSITE EXT SMALLBORE NF

## (undated) DEVICE — NDL SAFETY 25G X 1.5 ECLIPSE

## (undated) DEVICE — SYR 3CC LUER LOC

## (undated) DEVICE — KIT SURGICAL TURNOVER

## (undated) DEVICE — POSITIONER HEAD ADULT

## (undated) DEVICE — Device

## (undated) DEVICE — BANDAGE SHEER STRIP 3/4X3IN

## (undated) DEVICE — SYR DISP LL 5CC

## (undated) DEVICE — NDL QUINCKE S/SU 22GA 7IN

## (undated) DEVICE — NDL QUINCKE S/SU 22GA 5IN

## (undated) DEVICE — SEE MEDLINE ITEM 146410

## (undated) DEVICE — DRAPE MEDIUM SHEET 40X70IN